# Patient Record
Sex: MALE | Race: WHITE | NOT HISPANIC OR LATINO | Employment: OTHER | URBAN - METROPOLITAN AREA
[De-identification: names, ages, dates, MRNs, and addresses within clinical notes are randomized per-mention and may not be internally consistent; named-entity substitution may affect disease eponyms.]

---

## 2017-03-08 LAB
25(OH)D3 SERPL-MCNC: 59 NG/ML (ref 30–100)
ALBUMIN SERPL-MCNC: 3.9 G/DL (ref 3.6–5.1)
ALBUMIN/GLOB SERPL: 1.4 (CALC) (ref 1–2.5)
ALP SERPL-CCNC: 50 U/L (ref 40–115)
ALT SERPL-CCNC: 15 U/L (ref 9–46)
APPEARANCE UR: CLEAR
AST SERPL-CCNC: 22 U/L (ref 10–35)
B BURGDOR AB SER QL IA: >12 INDEX
B BURGDOR IGG SER QL IB: POSITIVE
B BURGDOR IGM SER QL IB: NEGATIVE
B BURGDOR18KD IGG SER QL IB: REACTIVE
B BURGDOR23KD IGG SER QL IB: REACTIVE
B BURGDOR23KD IGM SER QL IB: ABNORMAL
B BURGDOR28KD IGG SER QL IB: ABNORMAL
B BURGDOR30KD IGG SER QL IB: REACTIVE
B BURGDOR39KD IGG SER QL IB: REACTIVE
B BURGDOR39KD IGM SER QL IB: ABNORMAL
B BURGDOR41KD IGG SER QL IB: REACTIVE
B BURGDOR41KD IGM SER QL IB: ABNORMAL
B BURGDOR45KD IGG SER QL IB: REACTIVE
B BURGDOR58KD IGG SER QL IB: REACTIVE
B BURGDOR66KD IGG SER QL IB: REACTIVE
B BURGDOR93KD IGG SER QL IB: REACTIVE
BACTERIA UR QL AUTO: ABNORMAL /HPF
BASOPHILS # BLD AUTO: 5 CELLS/UL (ref 0–200)
BASOPHILS NFR BLD AUTO: 0.1 %
BILIRUB SERPL-MCNC: 0.9 MG/DL (ref 0.2–1.2)
BILIRUB UR QL STRIP: NEGATIVE
BUN SERPL-MCNC: 23 MG/DL (ref 7–25)
BUN/CREAT SERPL: 16 (CALC) (ref 6–22)
CALCIUM SERPL-MCNC: 9.4 MG/DL (ref 8.6–10.3)
CHLORIDE SERPL-SCNC: 98 MMOL/L (ref 98–110)
CHOLEST SERPL-MCNC: 159 MG/DL (ref 125–200)
CHOLEST/HDLC SERPL: 2.7 (CALC)
CO2 SERPL-SCNC: 28 MMOL/L (ref 20–31)
COLOR UR: ABNORMAL
CREAT SERPL-MCNC: 1.41 MG/DL (ref 0.7–1.18)
EOSINOPHIL # BLD AUTO: 58 CELLS/UL (ref 15–500)
EOSINOPHIL NFR BLD AUTO: 1.2 %
ERYTHROCYTE [DISTWIDTH] IN BLOOD BY AUTOMATED COUNT: 15.3 % (ref 11–15)
GLOBULIN SER CALC-MCNC: 2.7 G/DL (CALC) (ref 1.9–3.7)
GLUCOSE SERPL-MCNC: 86 MG/DL (ref 65–99)
GLUCOSE UR QL STRIP: NEGATIVE
HCT VFR BLD AUTO: 44.6 % (ref 38.5–50)
HDLC SERPL-MCNC: 58 MG/DL
HGB BLD-MCNC: 15.3 G/DL (ref 13.2–17.1)
HGB UR QL STRIP: NEGATIVE
HYALINE CASTS #/AREA URNS LPF: ABNORMAL /LPF
KETONES UR QL STRIP: ABNORMAL
LDLC SERPL CALC-MCNC: 74 MG/DL (CALC)
LEUKOCYTE ESTERASE UR QL STRIP: NEGATIVE
LYMPHOCYTES # BLD AUTO: 518 CELLS/UL (ref 850–3900)
LYMPHOCYTES NFR BLD AUTO: 10.8 %
MCH RBC QN AUTO: 32.9 PG (ref 27–33)
MCHC RBC AUTO-ENTMCNC: 34.2 G/DL (ref 32–36)
MCV RBC AUTO: 96 FL (ref 80–100)
MONOCYTES # BLD AUTO: 336 CELLS/UL (ref 200–950)
MONOCYTES NFR BLD AUTO: 7 %
NEUTROPHILS # BLD AUTO: 3883 CELLS/UL (ref 1500–7800)
NEUTROPHILS NFR BLD AUTO: 80.9 %
NITRITE UR QL STRIP: NEGATIVE
NONHDLC SERPL-MCNC: 101 MG/DL (CALC)
PH UR STRIP: 5.5 [PH] (ref 5–8)
PLATELET # BLD AUTO: 172 THOUSAND/UL (ref 140–400)
PMV BLD REES-ECKER: 7.3 FL (ref 7.5–12.5)
POTASSIUM SERPL-SCNC: 4.1 MMOL/L (ref 3.5–5.3)
PROT SERPL-MCNC: 6.6 G/DL (ref 6.1–8.1)
PROT UR QL STRIP: ABNORMAL
RBC # BLD AUTO: 4.65 MILLION/UL (ref 4.2–5.8)
RBC #/AREA URNS HPF: ABNORMAL /HPF
SL AMB EGFR AFRICAN AMERICAN: 56 ML/MIN/1.73M2
SL AMB EGFR NON AFRICAN AMERICAN: 48 ML/MIN/1.73M2
SODIUM SERPL-SCNC: 138 MMOL/L (ref 135–146)
SP GR UR STRIP: 1.02 (ref 1–1.03)
SQUAMOUS #/AREA URNS HPF: ABNORMAL /HPF
TRIGL SERPL-MCNC: 133 MG/DL
TSH SERPL-ACNC: 1.98 MIU/L (ref 0.4–4.5)
WBC # BLD AUTO: 4.8 THOUSAND/UL (ref 3.8–10.8)
WBC #/AREA URNS HPF: ABNORMAL /HPF

## 2017-03-11 LAB — COLOR UR: NORMAL

## 2017-03-28 LAB
25(OH)D3 SERPL-MCNC: 49 NG/ML (ref 30–100)
ALBUMIN SERPL-MCNC: 3.8 G/DL (ref 3.6–5.1)
ALBUMIN/CREAT UR: 20 MCG/MG CREAT
ALBUMIN/GLOB SERPL: 1.5 (CALC) (ref 1–2.5)
ALP SERPL-CCNC: 49 U/L (ref 40–115)
ALT SERPL-CCNC: 13 U/L (ref 9–46)
AST SERPL-CCNC: 18 U/L (ref 10–35)
BASOPHILS # BLD AUTO: 19 CELLS/UL (ref 0–200)
BASOPHILS NFR BLD AUTO: 0.6 %
BILIRUB SERPL-MCNC: 0.6 MG/DL (ref 0.2–1.2)
BUN SERPL-MCNC: 15 MG/DL (ref 7–25)
BUN/CREAT SERPL: ABNORMAL (CALC) (ref 6–22)
CALCIUM SERPL-MCNC: 9.3 MG/DL (ref 8.6–10.3)
CHLORIDE SERPL-SCNC: 102 MMOL/L (ref 98–110)
CHOLEST SERPL-MCNC: 152 MG/DL (ref 125–200)
CHOLEST/HDLC SERPL: 2.5 (CALC)
CO2 SERPL-SCNC: 32 MMOL/L (ref 20–31)
CREAT SERPL-MCNC: 1.07 MG/DL (ref 0.7–1.18)
CREAT UR-MCNC: 188 MG/DL (ref 20–370)
EOSINOPHIL # BLD AUTO: 77 CELLS/UL (ref 15–500)
EOSINOPHIL NFR BLD AUTO: 2.4 %
ERYTHROCYTE [DISTWIDTH] IN BLOOD BY AUTOMATED COUNT: 17.3 % (ref 11–15)
EST. AVERAGE GLUCOSE BLD GHB EST-MCNC: 103 (CALC)
EST. AVERAGE GLUCOSE BLD GHB EST-SCNC: 5.7 (CALC)
GLOBULIN SER CALC-MCNC: 2.6 G/DL (CALC) (ref 1.9–3.7)
GLUCOSE SERPL-MCNC: 94 MG/DL (ref 65–99)
HBA1C MFR BLD: 5.2 % OF TOTAL HGB
HCT VFR BLD AUTO: 42.9 % (ref 38.5–50)
HDLC SERPL-MCNC: 62 MG/DL
HGB BLD-MCNC: 14.9 G/DL (ref 13.2–17.1)
LDLC SERPL CALC-MCNC: 63 MG/DL (CALC)
LYMPHOCYTES # BLD AUTO: 714 CELLS/UL (ref 850–3900)
LYMPHOCYTES NFR BLD AUTO: 22.3 %
MCH RBC QN AUTO: 33.8 PG (ref 27–33)
MCHC RBC AUTO-ENTMCNC: 34.7 G/DL (ref 32–36)
MCV RBC AUTO: 97.5 FL (ref 80–100)
MICROALBUMIN UR-MCNC: 3.7 MG/DL
MONOCYTES # BLD AUTO: 269 CELLS/UL (ref 200–950)
MONOCYTES NFR BLD AUTO: 8.4 %
NEUTROPHILS # BLD AUTO: 2122 CELLS/UL (ref 1500–7800)
NEUTROPHILS NFR BLD AUTO: 66.3 %
NONHDLC SERPL-MCNC: 90 MG/DL (CALC)
PLATELET # BLD AUTO: 107 THOUSAND/UL (ref 140–400)
PMV BLD REES-ECKER: 7.6 FL (ref 7.5–12.5)
POTASSIUM SERPL-SCNC: 3.5 MMOL/L (ref 3.5–5.3)
PROT SERPL-MCNC: 6.4 G/DL (ref 6.1–8.1)
RBC # BLD AUTO: 4.4 MILLION/UL (ref 4.2–5.8)
SL AMB EGFR AFRICAN AMERICAN: 78 ML/MIN/1.73M2
SL AMB EGFR NON AFRICAN AMERICAN: 68 ML/MIN/1.73M2
SODIUM SERPL-SCNC: 141 MMOL/L (ref 135–146)
TRIGL SERPL-MCNC: 133 MG/DL
TSH SERPL-ACNC: 2.26 MIU/L (ref 0.4–4.5)
WBC # BLD AUTO: 3.2 THOUSAND/UL (ref 3.8–10.8)

## 2017-03-30 LAB
BASOPHILS # BLD AUTO: 15 CELLS/UL (ref 0–200)
BASOPHILS NFR BLD AUTO: 0.4 %
EOSINOPHIL # BLD AUTO: 44 CELLS/UL (ref 15–500)
EOSINOPHIL NFR BLD AUTO: 1.2 %
ERYTHROCYTE [DISTWIDTH] IN BLOOD BY AUTOMATED COUNT: 18.8 % (ref 11–15)
FOLATE SERPL-MCNC: 23.1 NG/ML
HCT VFR BLD AUTO: 43.7 % (ref 38.5–50)
HGB BLD-MCNC: 14.9 G/DL (ref 13.2–17.1)
LYMPHOCYTES # BLD AUTO: 651 CELLS/UL (ref 850–3900)
LYMPHOCYTES NFR BLD AUTO: 17.6 %
MCH RBC QN AUTO: 33.8 PG (ref 27–33)
MCHC RBC AUTO-ENTMCNC: 34.1 G/DL (ref 32–36)
MCV RBC AUTO: 99.1 FL (ref 80–100)
MONOCYTES # BLD AUTO: 315 CELLS/UL (ref 200–950)
MONOCYTES NFR BLD AUTO: 8.5 %
NEUTROPHILS # BLD AUTO: 2675 CELLS/UL (ref 1500–7800)
NEUTROPHILS NFR BLD AUTO: 72.3 %
PLATELET # BLD AUTO: 118 THOUSAND/UL (ref 140–400)
PMV BLD REES-ECKER: 8.3 FL (ref 7.5–12.5)
RBC # BLD AUTO: 4.41 MILLION/UL (ref 4.2–5.8)
VIT B12 SERPL-MCNC: 909 PG/ML (ref 200–1100)
WBC # BLD AUTO: 3.7 THOUSAND/UL (ref 3.8–10.8)

## 2017-07-03 LAB
25(OH)D3 SERPL-MCNC: 74 NG/ML (ref 30–100)
ALBUMIN SERPL-MCNC: 3.9 G/DL (ref 3.6–5.1)
ALBUMIN/CREAT UR: 10 MCG/MG CREAT
ALBUMIN/GLOB SERPL: 1.6 (CALC) (ref 1–2.5)
ALP SERPL-CCNC: 48 U/L (ref 40–115)
ALT SERPL-CCNC: 19 U/L (ref 9–46)
AST SERPL-CCNC: 24 U/L (ref 10–35)
BASOPHILS # BLD AUTO: 13 CELLS/UL (ref 0–200)
BASOPHILS NFR BLD AUTO: 0.3 %
BILIRUB SERPL-MCNC: 1.4 MG/DL (ref 0.2–1.2)
BUN SERPL-MCNC: 11 MG/DL (ref 7–25)
BUN/CREAT SERPL: ABNORMAL (CALC) (ref 6–22)
CALCIUM SERPL-MCNC: 9.5 MG/DL (ref 8.6–10.3)
CHLORIDE SERPL-SCNC: 100 MMOL/L (ref 98–110)
CHOLEST SERPL-MCNC: 155 MG/DL (ref 125–200)
CHOLEST/HDLC SERPL: 2.2 (CALC)
CO2 SERPL-SCNC: 34 MMOL/L (ref 20–31)
CREAT SERPL-MCNC: 1.01 MG/DL (ref 0.7–1.18)
CREAT UR-MCNC: 117 MG/DL (ref 20–370)
EOSINOPHIL # BLD AUTO: 80 CELLS/UL (ref 15–500)
EOSINOPHIL NFR BLD AUTO: 1.9 %
ERYTHROCYTE [DISTWIDTH] IN BLOOD BY AUTOMATED COUNT: 17 % (ref 11–15)
EST. AVERAGE GLUCOSE BLD GHB EST-MCNC: 82 (CALC)
EST. AVERAGE GLUCOSE BLD GHB EST-SCNC: 4.6 (CALC)
GLOBULIN SER CALC-MCNC: 2.4 G/DL (CALC) (ref 1.9–3.7)
GLUCOSE SERPL-MCNC: 102 MG/DL (ref 65–99)
HBA1C MFR BLD: 4.5 % OF TOTAL HGB
HCT VFR BLD AUTO: 46.5 % (ref 38.5–50)
HDLC SERPL-MCNC: 69 MG/DL
HGB BLD-MCNC: 15.9 G/DL (ref 13.2–17.1)
LDLC SERPL CALC-MCNC: 61 MG/DL (CALC)
LYMPHOCYTES # BLD AUTO: 504 CELLS/UL (ref 850–3900)
LYMPHOCYTES NFR BLD AUTO: 12 %
MCH RBC QN AUTO: 35.4 PG (ref 27–33)
MCHC RBC AUTO-ENTMCNC: 34.2 G/DL (ref 32–36)
MCV RBC AUTO: 103.6 FL (ref 80–100)
MICROALBUMIN UR-MCNC: 1.2 MG/DL
MONOCYTES # BLD AUTO: 340 CELLS/UL (ref 200–950)
MONOCYTES NFR BLD AUTO: 8.1 %
NEUTROPHILS # BLD AUTO: 3263 CELLS/UL (ref 1500–7800)
NEUTROPHILS NFR BLD AUTO: 77.7 %
NONHDLC SERPL-MCNC: 86 MG/DL (CALC)
PLATELET # BLD AUTO: 110 THOUSAND/UL (ref 140–400)
PMV BLD REES-ECKER: 7.6 FL (ref 7.5–12.5)
POTASSIUM SERPL-SCNC: 4.2 MMOL/L (ref 3.5–5.3)
PROT SERPL-MCNC: 6.3 G/DL (ref 6.1–8.1)
RBC # BLD AUTO: 4.49 MILLION/UL (ref 4.2–5.8)
SL AMB EGFR AFRICAN AMERICAN: 84 ML/MIN/1.73M2
SL AMB EGFR NON AFRICAN AMERICAN: 72 ML/MIN/1.73M2
SODIUM SERPL-SCNC: 139 MMOL/L (ref 135–146)
TRIGL SERPL-MCNC: 126 MG/DL
TSH SERPL-ACNC: 2.79 MIU/L (ref 0.4–4.5)
WBC # BLD AUTO: 4.2 THOUSAND/UL (ref 3.8–10.8)

## 2017-10-18 LAB — HCV AB SER-ACNC: NEGATIVE

## 2018-07-25 RX ORDER — ALLOPURINOL 100 MG/1
1 TABLET ORAL DAILY
COMMUNITY
End: 2018-08-30 | Stop reason: SDUPTHER

## 2018-07-25 RX ORDER — COLCHICINE 0.6 MG/1
1 TABLET ORAL DAILY PRN
COMMUNITY
End: 2018-12-28 | Stop reason: ALTCHOICE

## 2018-07-25 RX ORDER — PROPRANOLOL HYDROCHLORIDE 120 MG/1
1 CAPSULE, EXTENDED RELEASE ORAL DAILY
COMMUNITY
End: 2018-08-28 | Stop reason: ALTCHOICE

## 2018-07-25 RX ORDER — TADALAFIL 20 MG/1
TABLET ORAL
COMMUNITY
End: 2018-08-28 | Stop reason: SDDI

## 2018-07-25 RX ORDER — NICOTINE POLACRILEX 4 MG/1
1 GUM, CHEWING ORAL DAILY
COMMUNITY
End: 2021-11-03 | Stop reason: SDDI

## 2018-07-25 RX ORDER — FUROSEMIDE 20 MG/1
1 TABLET ORAL DAILY
COMMUNITY
End: 2018-08-28 | Stop reason: SDDI

## 2018-07-25 RX ORDER — LOSARTAN POTASSIUM 100 MG/1
50 TABLET ORAL DAILY
COMMUNITY
End: 2018-12-28 | Stop reason: DRUGHIGH

## 2018-07-30 ENCOUNTER — OFFICE VISIT (OUTPATIENT)
Dept: NEUROLOGY | Facility: CLINIC | Age: 78
End: 2018-07-30
Payer: COMMERCIAL

## 2018-07-30 VITALS
WEIGHT: 159.8 LBS | BODY MASS INDEX: 22.88 KG/M2 | SYSTOLIC BLOOD PRESSURE: 114 MMHG | HEART RATE: 54 BPM | RESPIRATION RATE: 17 BRPM | HEIGHT: 70 IN | DIASTOLIC BLOOD PRESSURE: 66 MMHG

## 2018-07-30 DIAGNOSIS — G25.0 TREMOR, ESSENTIAL: Primary | ICD-10-CM

## 2018-07-30 DIAGNOSIS — G62.89 OTHER SPECIFIED POLYNEUROPATHIES: ICD-10-CM

## 2018-07-30 DIAGNOSIS — Z72.0 TOBACCO ABUSE: ICD-10-CM

## 2018-07-30 PROCEDURE — 99214 OFFICE O/P EST MOD 30 MIN: CPT | Performed by: PSYCHIATRY & NEUROLOGY

## 2018-07-30 NOTE — ASSESSMENT & PLAN NOTE
Electrodiagnostically evident sensory polyneuropathy, predominantly axonal   After full in formal evaluation felt most likely to be on the basis of long-term heavy alcohol consumption  He has made every effort to curtail his drinking and is now down to only an occasional drink  He is not describing any numbness in the distal extremities now, just a coldness  He has had no advancing symptoms since last seen nor has he had the emergence of painful aspects  With a reduction in his alcohol consumption there does appear to actually be a modest overall improvement on examination  --to continue his efforts at complete alcohol cessation

## 2018-07-30 NOTE — PATIENT INSTRUCTIONS
To begin reducing your propranolol using your 80 mg tablets: Take 1 tablet in the morning and half tablet in the evening for 1 week; then 1/2 tablet twice daily  Call with a status report in 2 weeks to discuss possible further reduction  Call before then should you have any increasing tremor difficulties  Should you change her mind with regard to smoking cessation assistance, please call your primary physician or here  Continue your attempts at full alcohol limitation

## 2018-07-30 NOTE — ASSESSMENT & PLAN NOTE
Longstanding presence  Has been well controlled in the recent past as he continues on propranolol 80 mg twice daily  At this juncture, he would like to begin trying to limit medications and is hopeful that he can reduce and eventually discontinue the propranolol without significant tremor aggravation  We discussed the situation and he is aware of the potential for the tremor to re-emerged  However, he would like to begin attempts at weaning   --reduce propranolol to 80 mg in a m  and 40 mg in p m  for 1 week and then 40 mg b i d  --to call the office in 2 weeks with a status report and to discuss possible continued reduction in the propranolol schedule  --to call before 2 weeks should it his tremor reemerge

## 2018-07-30 NOTE — PROGRESS NOTES
Patient is here today for a follow up for his tremors    Patient ID: Gelacio Ryder is a 69 y o  male  Assessment/Plan:    Tremor, essential  Longstanding presence  Has been well controlled in the recent past as he continues on propranolol 80 mg twice daily  At this juncture, he would like to begin trying to limit medications and is hopeful that he can reduce and eventually discontinue the propranolol without significant tremor aggravation  We discussed the situation and he is aware of the potential for the tremor to re-emerged  However, he would like to begin attempts at weaning   --reduce propranolol to 80 mg in a m  and 40 mg in p m  for 1 week and then 40 mg b i d  --to call the office in 2 weeks with a status report and to discuss possible continued reduction in the propranolol schedule  --to call before 2 weeks should it his tremor reemerge  Other specified polyneuropathies  Electrodiagnostically evident sensory polyneuropathy, predominantly axonal   After full in formal evaluation felt most likely to be on the basis of long-term heavy alcohol consumption  He has made every effort to curtail his drinking and is now down to only an occasional drink  He is not describing any numbness in the distal extremities now, just a coldness  He has had no advancing symptoms since last seen nor has he had the emergence of painful aspects  With a reduction in his alcohol consumption there does appear to actually be a modest overall improvement on examination  --to continue his efforts at complete alcohol cessation  Tobacco abuse  Long discussion today  He is now down to 1 pack per day  He is aware and was once again made aware of the appropriateness of full smoking cessation  At this point in time he feels he is not ready to do so and is not interested in any counseling or other support measures    --advised that should he change his mind with regard to smoking cessation support to contact his primary care physician or this office  I spent a total of 25 min with the patient with greater than 50% of that time spent counseling and coordinating his care, specifically discussing his diagnosis, additional tests, and discussing the case with his care team, as detailed above  He will follow up in 6 months or p r n     Subjective:    HPI  Patient, now 69years of age, presents for further assessment with regard to 2 problems  The 1st is his longstanding tremor felt to be of an essential type  He has 4 control been on propranolol 80 mg twice daily  He has tolerated the propranolol without any reported adverse side effects  However, today, he stated that he would like very much to begin to limit his medications and is hopeful that since his tremor is non problematic he can reduce and eventually come off the propranolol  I did make him aware of the fact that the tremor could certainly re-emerged should he do so, but he recognizing so would still like to make that attempt  A 2nd issue is his electrodiagnostically evident and predominantly axonal sensory polyneuropathy  He has had an extensive workup  The only element missing when last seen was his heavy metal screen  That has now been completed and is negative for any issues with arsenic, cadmium, mercury or lead  His polyneuropathy is felt to be secondary to a long history of heavy alcohol use  He continues to limit the amount that he is drinking and is now down to only an occasional drink  He has actually had some symptomatic improvement  He is no longer describing any overt numbness but instead now modest sense of distal extremity coldness  There has been no evolving motor issues  He has been no evolving pain related issues  He continues to smoke at a pace of approximately 1 pack of cigarettes daily  We spoke today with regard to the appropriateness of cessation    However, unfortunately, he is adamant that he is not willing to do so at this point in time and is not interested in any smoking cessation assistance or counseling  An intervening medical problem occurred since his last appointment  At that time he was found to have a liver nodule  He has subsequently had surgery with removal as per patient of approximately 40% of his liver without nodule being reported as cancerous  Fortunately, there appeared to be no evident spread and he continues his appropriate follow-up      Past Medical History:   Diagnosis Date    Arthritis     BPH (benign prostatic hyperplasia) 2002    Caffeine abuse     Colitis     Diverticulitis     ETOH abuse     GERD (gastroesophageal reflux disease)     Gout     Hemorrhoids     HTN (hypertension)     Hydrocele     Hyperlipidemia     Hyperthyroidism     Lyme disease     Meningioma (HCC)     Nicotine abuse     2-3 packs    Pulmonary nodule     Rheumatoid arthritis (HCC)     Thyroid nodule     Tremor      Past Surgical History:   Procedure Laterality Date    APPENDECTOMY      HAND SURGERY  04/30/2018   [facility] LIVER SURGERY  03/09/2018     Social History     Social History    Marital status: /Civil Union     Spouse name: N/A    Number of children: N/A    Years of education: N/A     Occupational History          Social History Main Topics    Smoking status: Current Every Day Smoker    Smokeless tobacco: Current User      Comment: hx of nicotine abuse 2-3 packs    Alcohol use No    Drug use: No    Sexual activity: Not Asked     Other Topics Concern    None     Social History Narrative    Hx of caffeine abuse    Hx of ETOH abuse     Family History   Problem Relation Age of Onset    Diabetes Father     Other Father         colon problems/colostomy    Alcohol abuse Sister      No Known Allergies    Current Outpatient Prescriptions:     allopurinol (ZYLOPRIM) 100 mg tablet, Take 1 tablet by mouth daily, Disp: , Rfl:     colchicine (COLCRYS) 0 6 mg tablet, Take 1 tablet by mouth daily as needed, Disp: , Rfl:     furosemide (LASIX) 20 mg tablet, Take 1 tablet by mouth daily, Disp: , Rfl:     losartan (COZAAR) 100 MG tablet, Take 1 tablet by mouth daily, Disp: , Rfl:     Omeprazole 20 MG TBEC, Take 1 tablet by mouth daily, Disp: , Rfl:     propranolol (INDERAL LA) 120 mg 24 hr capsule, Take 1 capsule by mouth daily, Disp: , Rfl:     tadalafil (CIALIS) 20 MG tablet, Take by mouth, Disp: , Rfl:     Objective:    Blood pressure 114/66, pulse (!) 54, resp  rate 17, height 5' 10" (1 778 m), weight 72 5 kg (159 lb 12 8 oz)  Physical Exam  Lungs clear to auscultation  Rhythm regular  GI (abdomen) soft nontender with bowel sounds present  No lower extremity edema  Neurological Exam  Alert  Pleasantly interactive  No voice tremor  Unremarkable spontaneous gait  Able to heel and toe stand bilaterally  Romberg maneuver performed unremarkably  Cranial nerves 2-12 tested and grossly intact  Accurate with finger-to-nose and heel-to-shin maneuvers bilaterally  No lateralized extremity weakness  With sensory testing he did not describe reduced pin appreciation distally in lower extremities, which is an improvement over his previous examination  In addition, his vibratory appreciation was only modestly reduced distally in the lower extremities  He continued to maintain position sense appreciation of the great toes bilaterally  Muscle stretch reflexes bilaterally 2 throughout the upper extremities, bilaterally 1+ at the knees, absent at the right ankle and trace at the left ankle  Toe response was downgoing bilaterally  No rest or sustention tremor was evident today  No head tremor will or jaw tremor was observed  Tone normal  No tone increase or cogwheeling with contralateral distraction maneuver  ROS:    Review of Systems   Constitutional: Negative  Negative for appetite change and fever  HENT: Negative    Negative for hearing loss, tinnitus, trouble swallowing and voice change  Eyes: Negative  Negative for photophobia and pain  Respiratory: Negative  Negative for shortness of breath  Cardiovascular: Negative  Negative for palpitations  Gastrointestinal: Negative  Negative for nausea and vomiting  Endocrine: Negative  Negative for cold intolerance and heat intolerance  Genitourinary: Negative  Negative for dysuria, frequency and urgency  Musculoskeletal: Negative  Negative for myalgias and neck pain  Skin: Negative  Negative for rash  Allergic/Immunologic: Negative  Neurological: Positive for tremors  Negative for dizziness, seizures, syncope, facial asymmetry, speech difficulty, weakness, light-headedness, numbness and headaches  Hematological: Bruises/bleeds easily  Psychiatric/Behavioral: Negative  Negative for confusion, hallucinations and sleep disturbance  I personally reviewed the ROS that was entered by the medical assistant

## 2018-07-30 NOTE — ASSESSMENT & PLAN NOTE
Long discussion today  He is now down to 1 pack per day  He is aware and was once again made aware of the appropriateness of full smoking cessation  At this point in time he feels he is not ready to do so and is not interested in any counseling or other support measures  --advised that should he change his mind with regard to smoking cessation support to contact his primary care physician or this office

## 2018-07-30 NOTE — LETTER
July 30, 2018     Power Zepeda MD  [address] [facility]  [address] [facility]    Patient: Ross Bueno   YOB: 1940   Date of Visit: 7/30/2018       Dear Dr Wil Cleaning: Thank you for referring Ivan Gutierrez to me for evaluation  Below are my notes for this consultation  If you have questions, please do not hesitate to call me  I look forward to following your patient along with you           Sincerely,        Alexandra Pires MD        CC: No Recipients

## 2018-08-07 ENCOUNTER — TELEPHONE (OUTPATIENT)
Dept: FAMILY MEDICINE CLINIC | Facility: CLINIC | Age: 78
End: 2018-08-07

## 2018-08-07 DIAGNOSIS — A69.20 LYME DISEASE: Primary | ICD-10-CM

## 2018-08-07 DIAGNOSIS — I10 ESSENTIAL HYPERTENSION: Primary | ICD-10-CM

## 2018-08-07 RX ORDER — DOXYCYCLINE HYCLATE 100 MG/1
100 CAPSULE ORAL EVERY 12 HOURS SCHEDULED
Qty: 28 CAPSULE | Refills: 0 | Status: SHIPPED | OUTPATIENT
Start: 2018-08-07 | End: 2018-08-21

## 2018-08-07 RX ORDER — PROPRANOLOL HYDROCHLORIDE 80 MG/1
80 TABLET ORAL EVERY 12 HOURS SCHEDULED
Qty: 60 TABLET | Refills: 2 | Status: SHIPPED | OUTPATIENT
Start: 2018-08-07 | End: 2018-12-07 | Stop reason: SDUPTHER

## 2018-08-07 NOTE — TELEPHONE ENCOUNTER
Spoke with patient states he always had lyme advised him it shows active and Kenia Pearson wants to treat him for it please sign off on medication thanks

## 2018-08-15 ENCOUNTER — TELEPHONE (OUTPATIENT)
Dept: FAMILY MEDICINE CLINIC | Facility: CLINIC | Age: 78
End: 2018-08-15

## 2018-08-16 NOTE — TELEPHONE ENCOUNTER
Called patient per dr Brandon Sánchez  Patient said hes going to wait until his apt on 8-27-18 doesnt want to pay copay

## 2018-08-23 PROBLEM — C22.0 HEPATOCELLULAR CARCINOMA (HCC): Status: ACTIVE | Noted: 2018-02-07

## 2018-08-23 PROBLEM — L89.153 DECUBITUS ULCER OF SACRAL REGION, STAGE 3 (HCC): Status: ACTIVE | Noted: 2018-03-12

## 2018-08-23 PROBLEM — I10 HYPERTENSION: Status: ACTIVE | Noted: 2018-08-23

## 2018-08-23 PROBLEM — K74.60 CIRRHOSIS (HCC): Status: ACTIVE | Noted: 2018-08-23

## 2018-08-23 PROBLEM — C61 MALIGNANT NEOPLASM OF PROSTATE (HCC): Status: ACTIVE | Noted: 2017-07-24

## 2018-08-23 PROBLEM — J44.9 COPD (CHRONIC OBSTRUCTIVE PULMONARY DISEASE) (HCC): Status: ACTIVE | Noted: 2018-08-23

## 2018-08-28 ENCOUNTER — OFFICE VISIT (OUTPATIENT)
Dept: FAMILY MEDICINE CLINIC | Facility: CLINIC | Age: 78
End: 2018-08-28
Payer: COMMERCIAL

## 2018-08-28 VITALS
HEIGHT: 70 IN | SYSTOLIC BLOOD PRESSURE: 120 MMHG | DIASTOLIC BLOOD PRESSURE: 60 MMHG | BODY MASS INDEX: 23.34 KG/M2 | RESPIRATION RATE: 16 BRPM | OXYGEN SATURATION: 95 % | WEIGHT: 163 LBS | HEART RATE: 61 BPM

## 2018-08-28 DIAGNOSIS — L89.153 DECUBITUS ULCER OF SACRAL REGION, STAGE 3 (HCC): ICD-10-CM

## 2018-08-28 DIAGNOSIS — R73.01 IMPAIRED FASTING GLUCOSE: ICD-10-CM

## 2018-08-28 DIAGNOSIS — M1A.00X0 IDIOPATHIC CHRONIC GOUT WITHOUT TOPHUS, UNSPECIFIED SITE: ICD-10-CM

## 2018-08-28 DIAGNOSIS — Z72.0 TOBACCO ABUSE: ICD-10-CM

## 2018-08-28 DIAGNOSIS — I10 ESSENTIAL HYPERTENSION: Primary | ICD-10-CM

## 2018-08-28 DIAGNOSIS — K21.9 GASTROESOPHAGEAL REFLUX DISEASE WITHOUT ESOPHAGITIS: ICD-10-CM

## 2018-08-28 PROBLEM — Z85.46 HISTORY OF PROSTATE CANCER: Status: ACTIVE | Noted: 2018-08-28

## 2018-08-28 PROBLEM — E04.1 THYROID NODULE: Status: ACTIVE | Noted: 2018-08-28

## 2018-08-28 PROBLEM — M72.0 CONTRACTURE OF PALMAR FASCIA: Status: ACTIVE | Noted: 2018-04-10

## 2018-08-28 PROBLEM — M71.21 SYNOVIAL CYST OF RIGHT POPLITEAL SPACE: Status: ACTIVE | Noted: 2018-08-28

## 2018-08-28 PROBLEM — D69.6 THROMBOCYTOPENIA (HCC): Status: ACTIVE | Noted: 2018-08-28

## 2018-08-28 PROCEDURE — 99214 OFFICE O/P EST MOD 30 MIN: CPT | Performed by: FAMILY MEDICINE

## 2018-08-28 PROCEDURE — 3725F SCREEN DEPRESSION PERFORMED: CPT | Performed by: FAMILY MEDICINE

## 2018-08-28 PROCEDURE — 1101F PT FALLS ASSESS-DOCD LE1/YR: CPT | Performed by: FAMILY MEDICINE

## 2018-08-28 PROCEDURE — 1160F RVW MEDS BY RX/DR IN RCRD: CPT | Performed by: FAMILY MEDICINE

## 2018-08-28 PROCEDURE — 3008F BODY MASS INDEX DOCD: CPT | Performed by: FAMILY MEDICINE

## 2018-08-28 PROCEDURE — 3078F DIAST BP <80 MM HG: CPT | Performed by: FAMILY MEDICINE

## 2018-08-28 PROCEDURE — 3074F SYST BP LT 130 MM HG: CPT | Performed by: FAMILY MEDICINE

## 2018-08-28 RX ORDER — COPPER GLUCONATE 2 MG
1 TABLET ORAL DAILY
COMMUNITY
End: 2019-02-14 | Stop reason: ALTCHOICE

## 2018-08-28 NOTE — PROGRESS NOTES
Assessment/Plan:    Gastroesophageal reflux disease  Chronic ,well controlled by Omeprazole  Discuss with pt important lose weight   Multiple small meal ,avoid eat and lying down ,avoid spicy food and avoid provoked food        Impaired fasting glucose  Fair control the low carb diet encouraged patient to continue his low carb diet    Hypertension  Chronic with controlled continue current medication low-salt diet less than 2 g a day ,   low caffeine intake   regular aerobic exercise 20 to totally minute a day diet and important lose weight discussed with the patient      Gout  Chronic asymptomatic patient will continue with the colchicine and allopurinol as prescribed the proper gout diet discussed with the patient    Decubitus ulcer of sacral region, stage 3 ([address] )  Recurrence the will refer patient to wound care we discussed with the patient important sitting on cushingoid chair and the change position every 2 hr       Diagnoses and all orders for this visit:    Essential hypertension  -     CBC and differential; Future  -     Comprehensive metabolic panel; Future  -     Hemoglobin A1C; Future  -     Lipid panel; Future  -     Microalbumin / creatinine urine ratio; Future  -     TSH, 3rd generation with Free T4 reflex; Future    Impaired fasting glucose  -     CBC and differential; Future  -     Comprehensive metabolic panel; Future  -     Hemoglobin A1C; Future  -     Lipid panel; Future  -     Microalbumin / creatinine urine ratio; Future  -     TSH, 3rd generation with Free T4 reflex; Future    Gastroesophageal reflux disease without esophagitis  -     CBC and differential; Future  -     Comprehensive metabolic panel; Future  -     Hemoglobin A1C; Future  -     Lipid panel; Future  -     Microalbumin / creatinine urine ratio; Future  -     TSH, 3rd generation with Free T4 reflex; Future    Tobacco abuse  -     CBC and differential; Future  -     Comprehensive metabolic panel;  Future  -     Hemoglobin A1C; Future  -     Lipid panel; Future  -     Microalbumin / creatinine urine ratio; Future  -     TSH, 3rd generation with Free T4 reflex; Future    Decubitus ulcer of sacral region, stage 3 ([address] )  -     Ambulatory referral to Wound Care; Future    Idiopathic chronic gout without tophus, unspecified site    Other orders  -     Omega-3 Fatty Acids (OMEGA-3 FISH OIL PO); Take 2 g by mouth  -     Copper Gluconate 2 MG TABS; Take 1 tablet by mouth  -     Cholecalciferol 1000 units CHEW; Chew 1 tablet  -     Discontinue: B Complex Vitamins (B COMPLEX 1 PO); Take 1 tablet by mouth          Subjective:   Chief Complaint   Patient presents with    Follow-up     chronic conditions         Patient ID: Burnett Curling is a 69 y o  male      Patient and office follow-up with a chronic condition The patient has long history of hypertension the blood pressure today is in control patient tolerates medication well and no chest pain or short of breath no palpitation no headache patient also with history of for GERD control with the omeprazole deny any abdomen pain no nausea vomiting no diarrhea no heartburn no weight change patient's history of gout will control pain she has seen endocrine no no got exacerbation for more than 1 year patient continued to smoke despite smoking cessation discussed with the patient multiple times he been smoking 1-1 and half pack a day for more than 2 years  Patient history of impaired fasting glucose controlled by low carb diet   Patient did have a history of sacral ulcer stage III on his left buttock area and it is recurrence the that 3 weeks ago did had to drain patient apply antibiotic and does not drain any more but the feel still there no fever no pain at the site and no redness  Recent blood work discussed with the patient        The following portions of the patient's history were reviewed and updated as appropriate: allergies, current medications, past family history, past medical history, past social history, past surgical history and problem list     Review of Systems   Constitutional: Negative for fatigue and fever  HENT: Negative for ear pain, sinus pain, sinus pressure and sore throat  Eyes: Negative for pain and redness  Respiratory: Negative for cough, chest tightness and shortness of breath  Cardiovascular: Negative for chest pain, palpitations and leg swelling  Gastrointestinal: Negative for abdominal pain, blood in stool, constipation, diarrhea and nausea  Genitourinary: Negative for flank pain, frequency and hematuria  Musculoskeletal: Negative for back pain and joint swelling  Skin: Negative for rash  Neurological: Negative for dizziness, numbness and headaches  Hematological: Does not bruise/bleed easily  Objective:  Vitals:    08/28/18 1310   BP: 120/60   BP Location: Left arm   Patient Position: Sitting   Cuff Size: Large   Pulse: 61   Resp: 16   SpO2: 95%   Weight: 73 9 kg (163 lb)   Height: 5' 10" (1 778 m)      Physical Exam   Constitutional: He is oriented to person, place, and time  He appears well-developed and well-nourished  HENT:   Head: Normocephalic  Right Ear: External ear normal    Left Ear: External ear normal    Eyes: Conjunctivae and EOM are normal  Right eye exhibits no discharge  Left eye exhibits no discharge  Neck: No JVD present  Cardiovascular: Normal rate, regular rhythm and normal heart sounds  Exam reveals no gallop  No murmur heard  Pulmonary/Chest: Effort normal  No respiratory distress  He has no wheezes  He has no rales  He exhibits no tenderness  Abdominal: He exhibits no mass  There is no tenderness  There is no rebound  Musculoskeletal: He exhibits no edema or tenderness  Neurological: He is alert and oriented to person, place, and time  Skin: No rash noted  No erythema     On the left buttock there is skin ulcer erythrema on the border almost 3 mm , open in the center and no drainage no tenderness

## 2018-08-28 NOTE — PATIENT INSTRUCTIONS

## 2018-08-28 NOTE — ASSESSMENT & PLAN NOTE
Chronic ,well controlled by Omeprazole  Discuss with pt important lose weight   Multiple small meal ,avoid eat and lying down ,avoid spicy food and avoid provoked food

## 2018-08-28 NOTE — ASSESSMENT & PLAN NOTE
Recurrence the will refer patient to wound care we discussed with the patient important sitting on cushingoid chair and the change position every 2 hr

## 2018-08-28 NOTE — ASSESSMENT & PLAN NOTE
Chronic asymptomatic patient will continue with the colchicine and allopurinol as prescribed the proper gout diet discussed with the patient

## 2018-08-30 DIAGNOSIS — M10.9 GOUT, UNSPECIFIED CAUSE, UNSPECIFIED CHRONICITY, UNSPECIFIED SITE: Primary | ICD-10-CM

## 2018-08-30 RX ORDER — ALLOPURINOL 100 MG/1
100 TABLET ORAL DAILY
Qty: 90 TABLET | Refills: 2 | Status: SHIPPED | OUTPATIENT
Start: 2018-08-30 | End: 2019-09-18 | Stop reason: SDUPTHER

## 2018-09-11 ENCOUNTER — TELEPHONE (OUTPATIENT)
Dept: FAMILY MEDICINE CLINIC | Facility: CLINIC | Age: 78
End: 2018-09-11

## 2018-09-27 ENCOUNTER — ANESTHESIA EVENT (OUTPATIENT)
Dept: PERIOP | Facility: HOSPITAL | Age: 78
End: 2018-09-27
Payer: COMMERCIAL

## 2018-09-27 RX ORDER — CLINDAMYCIN HYDROCHLORIDE 300 MG/1
300 CAPSULE ORAL 3 TIMES DAILY
COMMUNITY
End: 2018-12-28 | Stop reason: ALTCHOICE

## 2018-09-27 RX ORDER — CIPROFLOXACIN 500 MG/1
500 TABLET, FILM COATED ORAL EVERY 12 HOURS SCHEDULED
COMMUNITY
End: 2018-12-28 | Stop reason: ALTCHOICE

## 2018-09-27 NOTE — ANESTHESIA PREPROCEDURE EVALUATION
Review of Systems/Medical History  Patient summary reviewed  Chart reviewed      Cardiovascular  Negative cardio ROS Exercise tolerance (METS): >4,  Hyperlipidemia, Hypertension controlled,   Comment: Persantine stress test neg for ischemia prior to sx  ,  Pulmonary  Smoker cigarette smoker  , Tobacco cessation counseling given Cumulative Pack Years: 61, COPD (no inhalers) , Sleep apnea ,        GI/Hepatic    GERD well controlled, Liver disease (40% hepatic resection @ [facility] 3/18  F/U fine per pt) , alcohol related, cirrhosis and history of liver cancer, Chronic liver disease,        Prostatic disorder, history of prostate cancer       Endo/Other  History of thyroid disease , hypothyroidism,      GYN       Hematology  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,  Thrombocytopenia,    Musculoskeletal  Rheumatoid arthritis Severity: mild, Gout,   Arthritis     Neurology  Negative neurology ROS     Comment: polyneuropaty due to ETOH  Not progressing per Dr Tony Pacheco  Tremor improving and proparanol is being decreased Psychology   Negative psychology ROS              Physical Exam    Airway    Mallampati score: II         Dental       Cardiovascular  Comment: Negative ROS, Cardiovascular exam normal    Pulmonary  Pulmonary exam normal     Other Findings  Fixed and  Upper and lower      Anesthesia Plan  ASA Score- 3     Anesthesia Type- IV sedation with anesthesia with ASA Monitors  Additional Monitors:   Airway Plan:         Plan Factors-Patient not instructed to abstain from smoking on day of procedure  Patient did not smoke on day of surgery  Induction- intravenous  Postoperative Plan- Plan for postoperative opioid use  Informed Consent- Anesthetic plan and risks discussed with patient  I personally reviewed this patient with the CRNA  Discussed and agreed on the Anesthesia Plan with the CRNA  Gabi Cartagena

## 2018-09-28 ENCOUNTER — ANESTHESIA (OUTPATIENT)
Dept: PERIOP | Facility: HOSPITAL | Age: 78
End: 2018-09-28
Payer: COMMERCIAL

## 2018-09-28 ENCOUNTER — HOSPITAL ENCOUNTER (OUTPATIENT)
Facility: HOSPITAL | Age: 78
Setting detail: OUTPATIENT SURGERY
Discharge: HOME | End: 2018-09-28
Attending: SURGERY | Admitting: SURGERY
Payer: COMMERCIAL

## 2018-09-28 VITALS
HEIGHT: 70 IN | DIASTOLIC BLOOD PRESSURE: 96 MMHG | OXYGEN SATURATION: 96 % | WEIGHT: 163 LBS | BODY MASS INDEX: 23.34 KG/M2 | HEART RATE: 52 BPM | SYSTOLIC BLOOD PRESSURE: 144 MMHG | TEMPERATURE: 96.3 F | RESPIRATION RATE: 20 BRPM

## 2018-09-28 DIAGNOSIS — L05.01 PILONIDAL CYST WITH ABSCESS: ICD-10-CM

## 2018-09-28 PROCEDURE — 88304 TISSUE EXAM BY PATHOLOGIST: CPT | Performed by: PATHOLOGY

## 2018-09-28 RX ORDER — DEXAMETHASONE SODIUM PHOSPHATE 4 MG/ML
4 INJECTION, SOLUTION INTRA-ARTICULAR; INTRALESIONAL; INTRAMUSCULAR; INTRAVENOUS; SOFT TISSUE ONCE AS NEEDED
Status: DISCONTINUED | OUTPATIENT
Start: 2018-09-28 | End: 2018-09-28 | Stop reason: HOSPADM

## 2018-09-28 RX ORDER — PROPOFOL 10 MG/ML
INJECTION, EMULSION INTRAVENOUS CONTINUOUS PRN
Status: DISCONTINUED | OUTPATIENT
Start: 2018-09-28 | End: 2018-09-28 | Stop reason: SURG

## 2018-09-28 RX ORDER — SODIUM CHLORIDE, SODIUM LACTATE, POTASSIUM CHLORIDE, CALCIUM CHLORIDE 600; 310; 30; 20 MG/100ML; MG/100ML; MG/100ML; MG/100ML
75 INJECTION, SOLUTION INTRAVENOUS CONTINUOUS
Status: DISCONTINUED | OUTPATIENT
Start: 2018-09-28 | End: 2018-09-28 | Stop reason: HOSPADM

## 2018-09-28 RX ORDER — FENTANYL CITRATE/PF 50 MCG/ML
12.5 SYRINGE (ML) INJECTION
Status: DISCONTINUED | OUTPATIENT
Start: 2018-09-28 | End: 2018-09-28 | Stop reason: HOSPADM

## 2018-09-28 RX ORDER — DIPHENHYDRAMINE HYDROCHLORIDE 50 MG/ML
12.5 INJECTION INTRAMUSCULAR; INTRAVENOUS ONCE
Status: DISCONTINUED | OUTPATIENT
Start: 2018-09-28 | End: 2018-09-28 | Stop reason: HOSPADM

## 2018-09-28 RX ORDER — LIDOCAINE HYDROCHLORIDE 10 MG/ML
INJECTION, SOLUTION INFILTRATION; PERINEURAL AS NEEDED
Status: DISCONTINUED | OUTPATIENT
Start: 2018-09-28 | End: 2018-09-28 | Stop reason: SURG

## 2018-09-28 RX ORDER — MAGNESIUM HYDROXIDE 1200 MG/15ML
LIQUID ORAL AS NEEDED
Status: DISCONTINUED | OUTPATIENT
Start: 2018-09-28 | End: 2018-09-28 | Stop reason: HOSPADM

## 2018-09-28 RX ORDER — FENTANYL CITRATE/PF 50 MCG/ML
25 SYRINGE (ML) INJECTION
Status: DISCONTINUED | OUTPATIENT
Start: 2018-09-28 | End: 2018-09-28 | Stop reason: HOSPADM

## 2018-09-28 RX ORDER — OXYCODONE HYDROCHLORIDE AND ACETAMINOPHEN 5; 325 MG/1; MG/1
1 TABLET ORAL EVERY 4 HOURS PRN
Qty: 20 TABLET | Refills: 0 | Status: SHIPPED | OUTPATIENT
Start: 2018-09-28 | End: 2018-10-08

## 2018-09-28 RX ORDER — FENTANYL CITRATE 50 UG/ML
INJECTION, SOLUTION INTRAMUSCULAR; INTRAVENOUS AS NEEDED
Status: DISCONTINUED | OUTPATIENT
Start: 2018-09-28 | End: 2018-09-28 | Stop reason: SURG

## 2018-09-28 RX ADMIN — FENTANYL CITRATE 50 MCG: 50 INJECTION INTRAMUSCULAR; INTRAVENOUS at 08:32

## 2018-09-28 RX ADMIN — SODIUM CHLORIDE, POTASSIUM CHLORIDE, SODIUM LACTATE AND CALCIUM CHLORIDE 75 ML/HR: 600; 310; 30; 20 INJECTION, SOLUTION INTRAVENOUS at 08:16

## 2018-09-28 RX ADMIN — FENTANYL CITRATE 25 MCG: 50 INJECTION INTRAMUSCULAR; INTRAVENOUS at 08:45

## 2018-09-28 RX ADMIN — FENTANYL CITRATE 25 MCG: 50 INJECTION INTRAMUSCULAR; INTRAVENOUS at 08:39

## 2018-09-28 RX ADMIN — PROPOFOL 120 MCG/KG/MIN: 10 INJECTION, EMULSION INTRAVENOUS at 08:32

## 2018-09-28 RX ADMIN — CEFAZOLIN SODIUM 2000 MG: 2 SOLUTION INTRAVENOUS at 08:32

## 2018-09-28 RX ADMIN — LIDOCAINE HYDROCHLORIDE 50 MG: 10 INJECTION, SOLUTION INFILTRATION; PERINEURAL at 08:32

## 2018-09-28 NOTE — ANESTHESIA POSTPROCEDURE EVALUATION
Post-Op Assessment Note      CV Status:  Stable    Mental Status:  Alert and awake    Hydration Status:  Euvolemic    PONV Controlled:  Controlled    Airway Patency:  Patent    Post Op Vitals Reviewed: Yes          Staff: AnesthesiologistLELA           /64 (09/28/18 0930)    Temp      Pulse 55 (09/28/18 0930)   Resp 20 (09/28/18 0930)    SpO2 97 % (09/28/18 0930)

## 2018-09-28 NOTE — DISCHARGE INSTR - AVS FIRST PAGE
No lifting, no driving, keep clean and  Dry    Take one Percocet every 4 hours for pain    Take one Cipro every 12 hours    Take one Cleocin every 8 hours

## 2018-09-28 NOTE — DISCHARGE INSTRUCTIONS
Pilonidal Cyst   WHAT YOU SHOULD KNOW:   A pilonidal cyst is a small sac under the skin that looks like a small hole or dimple  It usually grows in the skin on your lower back, near the bottom of the spine  AFTER YOU LEAVE:   Follow up with your healthcare provider as directed:  Write down your questions so you remember to ask them during your visits  Contact your primary healthcare provider if:   · You have a fever  · Your cyst is red and swollen  · No lifting, no driving, Keep clean and dry  · Your cyst has white or yellow fluid coming out of it  · You have questions or concerns about your condition or care  © 2014 [address] is for End User's use only and may not be sold, redistributed or otherwise used for commercial purposes  All illustrations and images included in CareNotes® are the copyrighted property of [employer]  or [employer]  The above information is an  only  It is not intended as medical advice for individual conditions or treatments  Talk to your doctor, nurse or pharmacist before following any medical regimen to see if it is safe and effective for you

## 2018-09-28 NOTE — NURSING NOTE
Pt ambulatory, voided QS  Seen by Dr Milena Lockhart for bleeding, saturated dressing  Pt sutured and repacked  Sacral dressing clean and dry  Pt instructed to keep dressing clean and dry until seen in Dr Reanna David office on Monday  Pt understands to notify Dr Milena Lockhart for any further bleeding or other problems  Written and verbal instructions given Rx being filled by [address] and understands how to take  Pt tolerated po and has no complaints

## 2018-09-28 NOTE — PROGRESS NOTES
Bleeding controlled with one figure of eight suture of 3-0 Vicryl    Repacked and sent home in good condition

## 2018-09-28 NOTE — ADDENDUM NOTE
Addendum  created 09/28/18 1005 by Navdeep Chávez CRNA    Anesthesia Intra LDAs edited, LDA properties accepted

## 2018-09-28 NOTE — OP NOTE
OPERATIVE REPORT  PATIENT NAME: Lillie Cervantes    :  1940  MRN: [id number]  Pt Location:  OR ROOM 10    SURGERY DATE: 2018    Surgeon(s) and Role:     Maria Eugenia Martin DO - Primary    Preop Diagnosis:  Pilonidal cyst with abscess [L05 01]    Post-Op Diagnosis Codes:     * Pilonidal cyst with abscess [L05 01]    Procedure(s) (LRB):  PILONIDAL CYSTECTOMY (N/A)    Specimen(s):  ID Type Source Tests Collected by Time Destination   1 : Pilonidal cyst Tissue Pilonidal Cyst/Sinus TISSUE EXAM Farzaneh DO Isael 2018 0855        Estimated Blood Loss:   Minimal    Drains: 5 yards of 1/4"  Plain packing       Anesthesia Type:   IV Sedation with Anesthesia    Operative Indications:  Pilonidal cyst with abscess [L05 01]  Same    Operative Findings:  Pilonidal cyst with abscess    Complications:   None    Procedure and Technique: With the patient in the prone position prepped and draped usual manner a vertical elliptical incision was made in the midline over the coccyx  The draining cyst was located just to the left of the midline at the level of the coccyx  Dissection was carried down through the subcutaneous tissue purulent material was present  Dissection was carried down to the presacral fascia and the specimen was removed saved and sent to pathology Lab tissue diagnosis  The wound was debrided  Hemostasis was provided with electrocautery  The digital rectal exam was carried out there is no blood examining finger no palpable masses are present right angle retractors were inserted into the anal canal and no internal opening was present and therefore no fistula was present  The wound was then irrigated with with normal saline and packed with 5 yd of 1 quarter-inch plain gauze the wound was dressed with 4x4s which were  sutured in place with 2 simple sutures of 3-0 nylon    Pressure dressing was placed and he  was in he was taken to PACU in stable condition   I was present for the entire procedure    Patient Disposition:  PACU     SIGNATURE: Harrold Buerger, DO  DATE: September 28, 2018  TIME: 9:06 AM

## 2018-09-28 NOTE — NURSING NOTE
Pt returned to [facility] awake alert, IV infusing  Dressing to sacral area is clean, dry, and intact  Denies pain at this time  Taking clear liquids

## 2018-09-29 ENCOUNTER — HOSPITAL ENCOUNTER (EMERGENCY)
Facility: HOSPITAL | Age: 78
Discharge: HOME | End: 2018-09-29
Attending: EMERGENCY MEDICINE | Admitting: EMERGENCY MEDICINE
Payer: COMMERCIAL

## 2018-09-29 VITALS
HEART RATE: 68 BPM | DIASTOLIC BLOOD PRESSURE: 56 MMHG | WEIGHT: 164 LBS | OXYGEN SATURATION: 97 % | SYSTOLIC BLOOD PRESSURE: 115 MMHG | TEMPERATURE: 97.5 F | RESPIRATION RATE: 14 BRPM | BODY MASS INDEX: 23.53 KG/M2

## 2018-09-29 DIAGNOSIS — Z48.89 ENCOUNTER FOR POST SURGICAL WOUND CHECK: Primary | ICD-10-CM

## 2018-09-29 LAB
HCT VFR BLD AUTO: 41.2 % (ref 36.5–49.3)
HGB BLD-MCNC: 14.1 G/DL (ref 12–17)

## 2018-09-29 PROCEDURE — 99283 EMERGENCY DEPT VISIT LOW MDM: CPT

## 2018-09-29 PROCEDURE — 85014 HEMATOCRIT: CPT | Performed by: PHYSICIAN ASSISTANT

## 2018-09-29 PROCEDURE — 85018 HEMOGLOBIN: CPT | Performed by: PHYSICIAN ASSISTANT

## 2018-09-29 PROCEDURE — 36415 COLL VENOUS BLD VENIPUNCTURE: CPT | Performed by: PHYSICIAN ASSISTANT

## 2018-09-29 NOTE — DISCHARGE INSTRUCTIONS
Acute Wound Care   AMBULATORY CARE:   An acute wound  is an injury that causes a break in the skin  An acute wound can happen suddenly, last a short time, and may heal on its own  Common signs and symptoms of an acute wound:   · A cut, tear, or gash in your skin    · Bleeding, swelling, pain, or trouble moving the affected area    · Dirt or foreign objects inside the wound     · Milky, yellow, green, or brown pus in the wound     · Red, tender, or warm area around the pus    · Fever  Seek care immediately if:   · You have pus or a foul odor coming from the wound  · You have sudden trouble breathing or chest pain  · Blood soaks through your bandage  Contact your healthcare provider if:   · You have muscle, joint, or body aches, sweating, or a fever  · You have more swelling, redness, or bleeding in your wound  · Your skin is itchy, swollen, or you have a rash  · You have questions or concerns about your condition or care  Treatment for an acute wound  may include any of the following:  · Cleansing  is done with soap and water to wash away germs and decrease the risk of infection  Sterile water further cleans the wound  The cleaning is done under high pressure with a catheter tip and large syringe  A solution that kills germs may also be used  · Debridement  is done to clean and remove objects, dirt, or dead tissues from the open wound  Healthcare providers may also drain the wound to clean out pus  · Closure of the wound  is done with stitches, staples, skin adhesive, or other treatments  This may be done if the wound is wide or deep  Stitches may be needed if the wound is in an area that moves a lot, such as the hands, feet, and joints  Stitches may help to keep the wound from getting infected  They may also decrease the amount of scarring you have  Some wounds may heal better without stitches    Wound care:   · If your wound was closed with thin strips of medical tape, keep them clean and dry  The strips of medical tape will fall off on their own  Do not pull them off  · Keep the bandage clean and dry  Do not remove the bandage over your wound unless your healthcare provider says it is okay  · Wash your hands before and after you take care of your wound to prevent infection  · Clean the wound as directed  If you cannot reach the wound, have someone help you  · If you have packing, make sure all the gauze used to pack the wound is taken out and replaced as directed  Keep track of how many gauze dressings are placed inside the wound  Follow up with your healthcare provider as directed:  Write down your questions so you remember to ask them during your visits  © 2016 [employer] is for End User's use only and may not be sold, redistributed or otherwise used for commercial purposes  All illustrations and images included in CareNotes® are the copyrighted property of A D [employer] M [employer]  or [employer]  The above information is an  only  It is not intended as medical advice for individual conditions or treatments  Talk to your doctor, nurse or pharmacist before following any medical regimen to see if it is safe and effective for you

## 2018-09-29 NOTE — ED PROVIDER NOTES
History  Chief Complaint   Patient presents with    Abscess     Pt reports he had an abscess drained surgically here yesterday  Pt reports bleeding to the site before leaving yesterday, states continued bleeding today     Patient is a 41-year-old presents emergency department for evaluation of postoperative wound drainage  Patient states he had pilonidal cyst strain yesterday at [facility] with Dr Adia Brown  He states that after the procedure he was having postoperative bleeding and his wound was packed and stitches were put in"  Patient states that overnight he completely saturated a zach with blood that he had in his bed  He states that the pain is not intense however he is just concerned of the bleeding  Patient is not currently any blood thinners  No headaches, dizziness, chest pain, shortness of breath  No other complaints at this time  Patient has never had problems with bleeding in the past            Prior to Admission Medications   Prescriptions Last Dose Informant Patient Reported? Taking?    Cholecalciferol 1000 units CHEW  Self Yes Yes   Sig: Chew 5,000 Units daily     Copper Gluconate 2 MG TABS  Self Yes Yes   Sig: Take 1 tablet by mouth daily     Omega-3 Fatty Acids (OMEGA-3 FISH OIL PO)  Self Yes Yes   Sig: Take 4,350 mg by mouth daily     Omeprazole 20 MG TBEC  Self Yes Yes   Sig: Take 1 tablet by mouth daily   allopurinol (ZYLOPRIM) 100 mg tablet   No Yes   Sig: Take 1 tablet (100 mg total) by mouth daily   Patient taking differently: Take 50 mg by mouth daily     ciprofloxacin (CIPRO) 500 mg tablet   Yes Yes   Sig: Take 500 mg by mouth every 12 (twelve) hours   clindamycin (CLEOCIN) 300 MG capsule   Yes Yes   Sig: Take 300 mg by mouth 3 (three) times a day   colchicine (COLCRYS) 0 6 mg tablet  Self Yes Yes   Sig: Take 1 tablet by mouth daily as needed   losartan (COZAAR) 100 MG tablet  Self Yes Yes   Sig: Take 50 mg by mouth daily     oxyCODONE-acetaminophen (PERCOCET) 5-325 mg per tablet   No Yes   Sig: Take 1 tablet by mouth every 4 (four) hours as needed for moderate pain for up to 10 days Max Daily Amount: 6 tablets   propranolol (INDERAL) 80 mg tablet  Self No Yes   Sig: Take 1 tablet (80 mg total) by mouth every 12 (twelve) hours   Patient taking differently: Take 40 mg by mouth daily        Facility-Administered Medications: None       Past Medical History:   Diagnosis Date    Anemia     dur to blood loss in the past    BPH (benign prostatic hyperplasia) 2002    Caffeine abuse     Cancer (HCC)     hepatocellular, prostate    Colitis     COPD (chronic obstructive pulmonary disease) (HCC)     chronic bronchitis    Diverticulitis     ETOH abuse     GERD (gastroesophageal reflux disease)     Gout     Hearing loss     Hemorrhoids     History of transfusion     HTN (hypertension)     Hydrocele     Hyperlipidemia     Hyperthyroidism     Irritable bowel syndrome     Liver disease     cirrhosis    Lyme disease     Meningioma (HCC)     Nicotine abuse     2-3 packs    Pressure injury of skin     Stage 3 that healed and has reoccurred    Pulmonary nodule     Sleep apnea     Thyroid nodule     Tobacco abuse     Tremor        Past Surgical History:   Procedure Laterality Date    APPENDECTOMY      CATARACT EXTRACTION      debra    ESOPHAGOGASTRODUODENOSCOPY      HAND SURGERY  04/30/2018    LIVER SURGERY  03/09/2018    THYROID SURGERY      TONSILLECTOMY         Family History   Problem Relation Age of Onset    Diabetes Father     Other Father         colon problems/colostomy    Alcohol abuse Sister      I have reviewed and agree with the history as documented      Social History   Substance Use Topics    Smoking status: Current Every Day Smoker     Packs/day: 1 50     Types: Cigarettes    Smokeless tobacco: Current User      Comment: hx of nicotine abuse 2-3 packs    Alcohol use No        Review of Systems   Constitutional: Negative for activity change, appetite change, chills, fatigue and fever  HENT: Negative for ear pain, sneezing and sore throat  Eyes: Negative for pain and visual disturbance  Respiratory: Negative for cough and shortness of breath  Cardiovascular: Negative for chest pain and palpitations  Gastrointestinal: Negative for abdominal pain, blood in stool, constipation, diarrhea, nausea and vomiting  Genitourinary: Negative for dysuria and hematuria  Musculoskeletal: Negative for arthralgias, neck pain and neck stiffness  Skin: Positive for wound  Negative for rash  Neurological: Negative for dizziness, weakness, light-headedness, numbness and headaches  All other systems reviewed and are negative  Physical Exam  Physical Exam   Constitutional: He is oriented to person, place, and time  He appears well-developed and well-nourished  No distress  HENT:   Head: Normocephalic and atraumatic  Nose: Nose normal    Eyes: Pupils are equal, round, and reactive to light  Conjunctivae and EOM are normal    Neck: Normal range of motion  Neck supple  Cardiovascular: Normal rate, regular rhythm, normal heart sounds and intact distal pulses  Exam reveals no gallop and no friction rub  No murmur heard  Pulmonary/Chest: Effort normal and breath sounds normal  No respiratory distress  He has no wheezes  He has no rales  Abdominal: Soft  He exhibits no distension  There is no tenderness  Musculoskeletal: Normal range of motion  He exhibits no edema, tenderness or deformity  Lymphadenopathy:     He has no cervical adenopathy  Neurological: He is alert and oriented to person, place, and time  Skin: Skin is warm and dry  Capillary refill takes less than 2 seconds  He is not diaphoretic  No erythema  No pallor  Large 4 cm incision noted on the medial aspect of his left buttocks  Packing fully saturated with blood noted  Four sutures placed at gluteal cleft parallel to wound  No tenderness to palpation  No cellulitis noted  Bloody drainage noted when patient moves  Nursing note and vitals reviewed  Vital Signs  ED Triage Vitals   Temperature Pulse Respirations Blood Pressure SpO2   09/29/18 0939 09/29/18 0939 09/29/18 0939 09/29/18 0939 09/29/18 0939   97 5 °F (36 4 °C) 65 18 135/67 98 %      Temp Source Heart Rate Source Patient Position - Orthostatic VS BP Location FiO2 (%)   09/29/18 0939 09/29/18 0939 09/29/18 0939 09/29/18 0939 --   Oral Monitor Sitting Right arm       Pain Score       09/29/18 0936       No Pain           Vitals:    09/29/18 0939 09/29/18 0945 09/29/18 1108 09/29/18 1115   BP: 135/67 135/67 115/56 115/56   Pulse: 65 64 68    Patient Position - Orthostatic VS: Sitting  Lying        Visual Acuity      ED Medications  Medications - No data to display    Diagnostic Studies  Results Reviewed     Procedure Component Value Units Date/Time    Hemoglobin and hematocrit, blood [[id number]]  (Normal) Collected:  09/29/18 1117    Lab Status:  Final result Specimen:  Blood from Arm, Right Updated:  09/29/18 1132     Hemoglobin 14 1 g/dL      Hematocrit 41 2 %                  No orders to display              Procedures  Procedures       Phone Contacts  ED Phone Contact    ED Course  ED Course as of Sep 29 1146   Sat Sep 29, 2018   1103 Page #2 to Dr Jael Alexander service    [phone] with Dr Nate Garcia who will see patient in office in one hour   Requesting H/H then discharge to office for eval                                MDM  Number of Diagnoses or Management Options  Encounter for post surgical wound check: new and requires workup  Risk of Complications, Morbidity, and/or Mortality  Presenting problems: low  Diagnostic procedures: low  Management options: low    Patient Progress  Patient progress: stable    CritCare Time    Disposition  Final diagnoses:   Encounter for post surgical wound check     Time reflects when diagnosis was documented in both MDM as applicable and the Disposition within this note     Time User Action Codes Description Comment    9/29/2018 11:22 AM Kar Madrid Add [Z48 89] Encounter for post surgical wound check       ED Disposition     ED Disposition Condition Comment    Discharge  Lillie Cervantes discharge to home/self care  Condition at discharge: Stable        Follow-up Information     Follow up With Specialties Details Why Contact Info Additional Information    Farzaneh Kirkland DO Vascular Surgery, General Surgery Go to go now  [address] [place] [address] Emergency Department Emergency Medicine  If symptoms worsen [address] [facility]  [phone] [place] ED, [address], [place], [address]          Patient's Medications   Discharge Prescriptions    No medications on file     No discharge procedures on file      ED Provider  Electronically Signed by           Michela Joy PA-C  09/29/18 8637

## 2018-11-26 DIAGNOSIS — I10 HYPERTENSION, UNSPECIFIED TYPE: Primary | ICD-10-CM

## 2018-11-26 RX ORDER — FUROSEMIDE 40 MG/1
40 TABLET ORAL
COMMUNITY
Start: 2013-09-30 | End: 2018-11-26 | Stop reason: SDUPTHER

## 2018-11-26 RX ORDER — FUROSEMIDE 40 MG/1
40 TABLET ORAL DAILY
Qty: 90 TABLET | Refills: 0 | Status: SHIPPED | OUTPATIENT
Start: 2018-11-26 | End: 2018-12-28 | Stop reason: SDDI

## 2018-12-07 DIAGNOSIS — I10 ESSENTIAL HYPERTENSION: ICD-10-CM

## 2018-12-07 RX ORDER — PROPRANOLOL HYDROCHLORIDE 80 MG/1
80 TABLET ORAL EVERY 12 HOURS SCHEDULED
Qty: 60 TABLET | Refills: 0 | Status: SHIPPED | OUTPATIENT
Start: 2018-12-07 | End: 2018-12-19 | Stop reason: SDUPTHER

## 2018-12-07 NOTE — TELEPHONE ENCOUNTER
Will refill medication for 30 day supplies patient is due for blood work and office visit to call the patient let him know

## 2018-12-19 DIAGNOSIS — I10 ESSENTIAL HYPERTENSION: ICD-10-CM

## 2018-12-19 RX ORDER — PROPRANOLOL HYDROCHLORIDE 80 MG/1
80 TABLET ORAL EVERY 12 HOURS SCHEDULED
Qty: 60 TABLET | Refills: 0 | Status: SHIPPED | OUTPATIENT
Start: 2018-12-19 | End: 2019-09-25 | Stop reason: ALTCHOICE

## 2018-12-28 ENCOUNTER — OFFICE VISIT (OUTPATIENT)
Dept: FAMILY MEDICINE CLINIC | Facility: CLINIC | Age: 78
End: 2018-12-28
Payer: COMMERCIAL

## 2018-12-28 VITALS
DIASTOLIC BLOOD PRESSURE: 60 MMHG | SYSTOLIC BLOOD PRESSURE: 120 MMHG | RESPIRATION RATE: 16 BRPM | BODY MASS INDEX: 23.62 KG/M2 | WEIGHT: 165 LBS | HEIGHT: 70 IN | HEART RATE: 98 BPM

## 2018-12-28 DIAGNOSIS — Z72.0 TOBACCO ABUSE: ICD-10-CM

## 2018-12-28 DIAGNOSIS — K70.30 ALCOHOLIC CIRRHOSIS OF LIVER WITHOUT ASCITES (HCC): ICD-10-CM

## 2018-12-28 DIAGNOSIS — Z23 NEED FOR SHINGLES VACCINE: ICD-10-CM

## 2018-12-28 DIAGNOSIS — R73.01 IMPAIRED FASTING GLUCOSE: ICD-10-CM

## 2018-12-28 DIAGNOSIS — K21.9 GASTROESOPHAGEAL REFLUX DISEASE WITHOUT ESOPHAGITIS: ICD-10-CM

## 2018-12-28 DIAGNOSIS — I10 ESSENTIAL HYPERTENSION: Primary | ICD-10-CM

## 2018-12-28 DIAGNOSIS — J44.9 CHRONIC OBSTRUCTIVE PULMONARY DISEASE, UNSPECIFIED COPD TYPE (HCC): ICD-10-CM

## 2018-12-28 DIAGNOSIS — G25.0 TREMOR, ESSENTIAL: ICD-10-CM

## 2018-12-28 DIAGNOSIS — M1A.00X0 IDIOPATHIC CHRONIC GOUT WITHOUT TOPHUS, UNSPECIFIED SITE: ICD-10-CM

## 2018-12-28 PROCEDURE — 1160F RVW MEDS BY RX/DR IN RCRD: CPT | Performed by: FAMILY MEDICINE

## 2018-12-28 PROCEDURE — 3074F SYST BP LT 130 MM HG: CPT | Performed by: FAMILY MEDICINE

## 2018-12-28 PROCEDURE — 99214 OFFICE O/P EST MOD 30 MIN: CPT | Performed by: FAMILY MEDICINE

## 2018-12-28 PROCEDURE — 3078F DIAST BP <80 MM HG: CPT | Performed by: FAMILY MEDICINE

## 2018-12-28 PROCEDURE — 4004F PT TOBACCO SCREEN RCVD TLK: CPT | Performed by: FAMILY MEDICINE

## 2018-12-28 RX ORDER — LOSARTAN POTASSIUM 50 MG/1
25 TABLET ORAL DAILY
Qty: 30 TABLET | Refills: 2 | Status: SHIPPED | OUTPATIENT
Start: 2018-12-28 | End: 2019-05-14 | Stop reason: SDUPTHER

## 2018-12-28 NOTE — PROGRESS NOTES
Subjective:   Chief Complaint   Patient presents with    Follow-up     chronic conditions         Patient ID: Sheree Rachel is a 69 y o  male  Patient here follow-up with a chronic condition  Patient's history of for GERD chronic on omeprazole 20 mg asymptomatic no heartburn no abdomen pain nausea vomiting or diarrhea no weight change patient does follow up with GI also he had history of cirrhosis and he per toe cell carcinoma status post surgical treatment the patient and he continued to smoke and she continued to drink alcohol  Patient's history of hypertension he deny any chest pain short of breath no palpitation no headache no blurred vision no weakness or lateralized of the symptom currently patient has been decrease in his medication without consult Dr  he stop the and the furosemide and also he decrease the dose of the losartan 100 mg to half tablet and per patient he doing well blood pressure controlled  Also patient has history of for gout chronic he is on the preventive the allopurinol 100 mg he been taking half of that dose and also he stop the day call G seen patient asymptomatic  Patient's history of COPD on chronic smoking and patient deny any short of breath no chest pain no cough no hematosis and patient free of exacerbation free of hospitalization patient does followed by Pulmonary  Recent blood work discussed with the patient        The following portions of the patient's history were reviewed and updated as appropriate: allergies, current medications, past family history, past medical history, past social history, past surgical history and problem list     Review of Systems   Constitutional: Negative for fatigue and fever  HENT: Negative for ear pain, sinus pain, sinus pressure and sore throat  Eyes: Negative for pain and redness  Respiratory: Negative for cough, chest tightness and shortness of breath  Cardiovascular: Negative for chest pain, palpitations and leg swelling  Gastrointestinal: Negative for abdominal pain, blood in stool, constipation, diarrhea and nausea  Genitourinary: Negative for flank pain, frequency and hematuria  Musculoskeletal: Negative for back pain and joint swelling  Skin: Negative for rash  Neurological: Negative for dizziness, numbness and headaches  Hematological: Does not bruise/bleed easily  Objective:  Vitals:    12/28/18 1321   BP: 120/60   BP Location: Left arm   Patient Position: Sitting   Cuff Size: Standard   Pulse: 98   Resp: 16   Weight: 74 8 kg (165 lb)   Height: 5' 10" (1 778 m)      Physical Exam   Constitutional: He is oriented to person, place, and time  He appears well-developed and well-nourished  HENT:   Head: Normocephalic  Right Ear: External ear normal    Left Ear: External ear normal    Eyes: Conjunctivae and EOM are normal  Right eye exhibits no discharge  Left eye exhibits no discharge  Neck: No JVD present  Cardiovascular: Normal rate, regular rhythm and normal heart sounds  Exam reveals no gallop  No murmur heard  Pulmonary/Chest: Effort normal  No respiratory distress  He has no wheezes  He has no rales  He exhibits no tenderness  Abdominal: He exhibits no mass  There is no tenderness  There is no rebound  Musculoskeletal: He exhibits no edema or tenderness  Neurological: He is alert and oriented to person, place, and time  Skin: No rash noted  No erythema           Assessment/Plan:    Cirrhosis ([address] )  Chronic asymptomatic patient does follow with GI the no edema no increase in the weight and no ascites    Impaired fasting glucose  Chronic asymptomatic fair control with the low carb diet encouraged patient to lose weight    Gastroesophageal reflux disease  Chronic ,well controlled by omeprazole 20 mg once a day  Discuss with pt important lose weight   Multiple small meal ,avoid eat and lying down ,avoid spicy food and avoid provoked food        COPD (chronic obstructive pulmonary disease) [facility])  Chronic asymptomatic free of exacerbation free of hospitalization and not on any inhaler patient stop it by himself patient does followed by the Pulmonary but he did not see him for while    Hypertension  Chronic asymptomatic patient currently taking half dose of the losartan 100 mg will stop the 100 mg of losartan on prescribe him 50 mg of losartan the patient did stop his Lasix  We discussed with the patient low-salt diet and monitor his blood pressure    Tobacco abuse  Tobacco Use/Cessation  i     I advised patient to quit, and offered support  Discussed current use pattern  Asked patient to inform me when they set a quit date     At discussed with the patient complication of for smoking increase the risk of multiple kind of cancer he is aware  We offer him script for nicotine patch patient decline it                  Gout  Free of gout chronic asymptomatic patient did stop the by himself the course she seen and also he is taking half dose of the allopurinol and 100 mg       Diagnoses and all orders for this visit:    Essential hypertension  -     losartan (COZAAR) 50 mg tablet; Take 0 5 tablets (25 mg total) by mouth daily  -     CBC and differential; Future  -     Comprehensive metabolic panel; Future  -     Lipid Panel with Direct LDL reflex; Future  -     TSH, 3rd generation with Free T4 reflex; Future  -     Hemoglobin A1C; Future    Alcoholic cirrhosis of liver without ascites (HCC)  -     CBC and differential; Future  -     Comprehensive metabolic panel; Future  -     Lipid Panel with Direct LDL reflex; Future  -     TSH, 3rd generation with Free T4 reflex; Future  -     Hemoglobin A1C; Future    Impaired fasting glucose  -     CBC and differential; Future  -     Comprehensive metabolic panel; Future  -     Lipid Panel with Direct LDL reflex; Future  -     TSH, 3rd generation with Free T4 reflex;  Future  -     Hemoglobin A1C; Future    Chronic obstructive pulmonary disease, unspecified COPD type ([facility] )  -     CBC and differential; Future  -     Comprehensive metabolic panel; Future  -     Lipid Panel with Direct LDL reflex; Future  -     TSH, 3rd generation with Free T4 reflex; Future  -     Hemoglobin A1C; Future    Tremor, essential  -     CBC and differential; Future  -     Comprehensive metabolic panel; Future  -     Lipid Panel with Direct LDL reflex; Future  -     TSH, 3rd generation with Free T4 reflex; Future  -     Hemoglobin A1C; Future    Idiopathic chronic gout without tophus, unspecified site  -     CBC and differential; Future  -     Comprehensive metabolic panel; Future  -     Lipid Panel with Direct LDL reflex; Future  -     TSH, 3rd generation with Free T4 reflex; Future  -     Hemoglobin A1C; Future    Tobacco abuse  -     CBC and differential; Future  -     Comprehensive metabolic panel; Future  -     Lipid Panel with Direct LDL reflex; Future  -     TSH, 3rd generation with Free T4 reflex; Future  -     Hemoglobin A1C; Future    Need for shingles vaccine  -     Zoster Vac Recomb Adjuvanted 50 MCG/0 5ML SUSR;  Inject 1 vial into a muscle once for 1 dose    Gastroesophageal reflux disease without esophagitis

## 2018-12-30 NOTE — ASSESSMENT & PLAN NOTE
Chronic asymptomatic free of exacerbation free of hospitalization and not on any inhaler patient stop it by himself patient does followed by the Pulmonary but he did not see him for while

## 2018-12-30 NOTE — ASSESSMENT & PLAN NOTE
Free of gout chronic asymptomatic patient did stop the by himself the course she seen and also he is taking half dose of the allopurinol and 100 mg

## 2018-12-30 NOTE — ASSESSMENT & PLAN NOTE
Chronic asymptomatic patient does follow with GI the no edema no increase in the weight and no ascites

## 2018-12-30 NOTE — ASSESSMENT & PLAN NOTE
Chronic asymptomatic patient currently taking half dose of the losartan 100 mg will stop the 100 mg of losartan on prescribe him 50 mg of losartan the patient did stop his Lasix  We discussed with the patient low-salt diet and monitor his blood pressure

## 2018-12-30 NOTE — ASSESSMENT & PLAN NOTE
Chronic ,well controlled by omeprazole 20 mg once a day  Discuss with pt important lose weight   Multiple small meal ,avoid eat and lying down ,avoid spicy food and avoid provoked food

## 2019-01-30 ENCOUNTER — OFFICE VISIT (OUTPATIENT)
Dept: NEUROLOGY | Facility: CLINIC | Age: 79
End: 2019-01-30
Payer: COMMERCIAL

## 2019-01-30 VITALS
RESPIRATION RATE: 18 BRPM | BODY MASS INDEX: 26.28 KG/M2 | SYSTOLIC BLOOD PRESSURE: 112 MMHG | HEART RATE: 62 BPM | HEIGHT: 68 IN | DIASTOLIC BLOOD PRESSURE: 70 MMHG | WEIGHT: 173.4 LBS

## 2019-01-30 DIAGNOSIS — G62.89 OTHER SPECIFIED POLYNEUROPATHIES: ICD-10-CM

## 2019-01-30 DIAGNOSIS — G25.0 TREMOR, ESSENTIAL: Primary | ICD-10-CM

## 2019-01-30 PROCEDURE — 99213 OFFICE O/P EST LOW 20 MIN: CPT | Performed by: PSYCHIATRY & NEUROLOGY

## 2019-01-30 NOTE — PATIENT INSTRUCTIONS
Further reduction in propranolol using 80 mg tablets and taking 1/4 tablet daily  If tremor does not increase over the next two weeks then discontinue the propranolol  Please call the office with a status update in four weeks

## 2019-01-30 NOTE — ASSESSMENT & PLAN NOTE
Longstanding history of essential tremor  Has been very much interested in attempting to come off medication as much as possible  He has been able to reduce his propranolol to now only 40 mg once daily with no significant tremor increase  Hopeful to be able to discontinue entirely  --reduce propranolol to 20 mg once daily in a cody [place] --if no tremor increase over the ensuing two weeks to discontinue the propranolol   --have asked that he call the office in four weeks with a status report

## 2019-01-30 NOTE — ASSESSMENT & PLAN NOTE
Confirmed by electrodiagnostic study  Has had a fall formal evaluation and his neuropathy is felt to be secondary to the long-term and heavy the past use of alcohol  No history are evidence on examination to suggest any progression of issues  Continues limited consumption of alcohol but has not obtained

## 2019-01-30 NOTE — PROGRESS NOTES
Patient is here today for his tremors    Patient ID: Derick Bui is a 69 y o  male  Assessment/Plan:    Tremor, essential  Longstanding history of essential tremor  Has been very much interested in attempting to come off medication as much as possible  He has been able to reduce his propranolol to now only 40 mg once daily with no significant tremor increase  Hopeful to be able to discontinue entirely  --reduce propranolol to 20 mg once daily in a m  [place] --if no tremor increase over the ensuing two weeks to discontinue the propranolol   --have asked that he call the office in four weeks with a status report  Other specified polyneuropathies  Confirmed by electrodiagnostic study  Has had a fall formal evaluation and his neuropathy is felt to be secondary to the long-term and heavy the past use of alcohol  No history are evidence on examination to suggest any progression of issues  Continues limited consumption of alcohol but has not obtained  He will follow up in six months or p r n     Subjective:    HPI  The patient, now 69years of age, is followed here with two issues  The 1st is his essential tremor, longstanding  He has been able to slowly reduce the medications used for control and is now down to propranolol at a dose of just 40 mg once daily  Although he still has modest tremor evident, he has had no increase in the presence of tremor and more importantly has not found with a reduction in medication that his tremor has become functionally impairing  He is hopeful of coming off as much in the way of medication as possible  The 2nd issue is his lower extremity sensory axonal polyneuropathy, electrodiagnostically evident and after full evaluation felt secondary to the long-term effects of past heavy alcohol consumption  Describes no symptoms to suggest any progressing issues    Has been able to limit his alcohol consumption to an average of one drink equivalent daily although has not been able to reach full abstinence  Past Medical History:   Diagnosis Date    Anemia     dur to blood loss in the past    BPH (benign prostatic hyperplasia) 2002    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HCC)     Cancer (HCC)     hepatocellular, prostate    Chronic bronchitis (HCC)     Colitis     COPD (chronic obstructive pulmonary disease) (HCC)     chronic bronchitis    Diverticulitis     Essential tremor     ETOH abuse     GERD (gastroesophageal reflux disease)     Gout     Hearing loss     Hemorrhoids     History of Lyme disease     History of thrombocytopenia     chronic mild    History of transfusion     HTN (hypertension)     Hydrocele     Hyperlipidemia     Hypertension     Hyperthyroidism     Irritable bowel syndrome     Liver disease     cirrhosis    Lyme disease     Meningioma (HCC)     Meningioma (HCC)     left frontal- followed by Neurosurgery    Nicotine abuse     2-3 packs    Obstructive sleep apnea     unable to tolerate CPAP    Pressure injury of skin     Stage 3 that healed and has reoccurred    Pulmonary nodule     Sensory polyneuropathy     Sleep apnea     Thyroid nodule     Tobacco abuse     Tremor      Past Surgical History:   Procedure Laterality Date    APPENDECTOMY      CATARACT EXTRACTION      debra    ESOPHAGOGASTRODUODENOSCOPY      HAND SURGERY  04/30/2018    LIVER SURGERY  03/09/2018    WI REMV PILONIDAL LESION SIMPLE N/A 9/28/2018    Procedure: PILONIDAL CYSTECTOMY;  Surgeon: Danette Malone DO;  Location: [facility] MAIN OR;  Service: [facility] THYROID SURGERY      TONSILLECTOMY       Social History     Social History    Marital status:       Spouse name: N/A    Number of children: N/A    Years of education: N/A     Occupational History          Social History Main Topics    Smoking status: Current Every Day Smoker     Packs/day: 1 50     Types: Cigarettes    Smokeless tobacco: Current User      Comment: hx of nicotine abuse 2-3 packs    Alcohol use No    Drug use: No    Sexual activity: Not Asked     Other Topics Concern    None     Social History Narrative    Hx of caffeine abuse    Hx of ETOH abuse     Family History   Problem Relation Age of Onset    Diabetes Father     Other Father         colon problems/colostomy    Alcohol abuse Sister      Allergies   Allergen Reactions    Pneumococcal Vaccine        Current Outpatient Prescriptions:     allopurinol (ZYLOPRIM) 100 mg tablet, Take 1 tablet (100 mg total) by mouth daily (Patient taking differently: Take 50 mg by mouth daily  ), Disp: 90 tablet, Rfl: 2    Cholecalciferol 1000 units CHEW, Chew 5,000 Units daily  , Disp: , Rfl:     losartan (COZAAR) 50 mg tablet, Take 0 5 tablets (25 mg total) by mouth daily, Disp: 30 tablet, Rfl: 2    Omega-3 Fatty Acids (OMEGA-3 FISH OIL PO), Take 4,350 mg by mouth daily  , Disp: , Rfl:     Omeprazole 20 MG TBEC, Take 1 tablet by mouth daily, Disp: , Rfl:     propranolol (INDERAL) 80 mg tablet, Take 1 tablet (80 mg total) by mouth every 12 (twelve) hours (Patient taking differently: Take 40 mg by mouth every 12 (twelve) hours  ), Disp: 60 tablet, Rfl: 0    Copper Gluconate 2 MG TABS, Take 1 tablet by mouth daily  , Disp: , Rfl:     Objective:    Blood pressure 112/70, pulse 62, resp  rate 18, height 5' 8" (1 727 m), weight 78 7 kg (173 lb 6 4 oz)  Physical Exam  Lungs with scattered expiratory wheezes  Rhythm regular  Neurological Exam  Alert  Pleasantly interactive  Fully oriented  Minimal tremor of the outstretched upper extremities, not increasing with extended sustention or with object holding  Normal rest tone  No tone increase or cogwheeling with contralateral distraction maneuver  Full testable lower extremity strength  Again with no pin loss in the distal lower extremities and with retention of position sense at the great toes and improved vibratory appreciation distal lower extremities    Muscle stretch reflexes bilaterally one at the knees and bilaterally absent at the ankles  ROS:    Review of Systems   Constitutional: Negative  Negative for appetite change and fever  HENT: Negative  Negative for hearing loss, tinnitus, trouble swallowing and voice change  Eyes: Negative  Negative for photophobia and pain  Respiratory: Negative  Negative for shortness of breath  Cardiovascular: Negative  Negative for palpitations  Gastrointestinal: Negative  Negative for nausea and vomiting  Endocrine: Negative  Negative for cold intolerance and heat intolerance  Genitourinary: Negative  Negative for dysuria, frequency and urgency  Musculoskeletal: Negative  Negative for myalgias and neck pain  Skin: Negative  Negative for rash  Allergic/Immunologic: Negative  Neurological: Positive for tremors (bilateral hand tremors)  Negative for dizziness, seizures, syncope, facial asymmetry, speech difficulty, weakness, light-headedness, numbness and headaches  Hematological: Negative  Does not bruise/bleed easily  Psychiatric/Behavioral: Negative  Negative for confusion, hallucinations and sleep disturbance  I personally reviewed the ROS that was entered by the medical assistant  *Please note this document was created using voice recognition software and may contain sound-alike word errors  *

## 2019-01-30 NOTE — LETTER
January 30, 2019     Latoya Lisa MD  [address] [facility]  [address]    Patient: Payton Hoover   YOB: 1940   Date of Visit: 1/30/2019       Dear Dr Porfirio Posey: Thank you for referring Meng Thomas to me for evaluation  Below are my notes for this consultation  If you have questions, please do not hesitate to call me  I look forward to following your patient along with you  Sincerely,        Sonia Siegel MD        CC: No Recipients  Sonia Siegel MD  1/30/2019  9:20 AM  Sign at close encounter  Patient is here today for his tremors    Patient ID: Payton Hoover is a 69 y o  male  Assessment/Plan:    Tremor, essential  Longstanding history of essential tremor  Has been very much interested in attempting to come off medication as much as possible  He has been able to reduce his propranolol to now only 40 mg once daily with no significant tremor increase  Hopeful to be able to discontinue entirely  --reduce propranolol to 20 mg once daily in a [facility] --if no tremor increase over the ensuing two weeks to discontinue the propranolol   --have asked that he call the office in four weeks with a status report  Other specified polyneuropathies  Confirmed by electrodiagnostic study  Has had a fall formal evaluation and his neuropathy is felt to be secondary to the long-term and heavy the past use of alcohol  No history are evidence on examination to suggest any progression of issues  Continues limited consumption of alcohol but has not obtained  He will follow up in six months or p r n     Subjective:    HPI  The patient, now 69years of age, is followed here with two issues  The 1st is his essential tremor, longstanding  He has been able to slowly reduce the medications used for control and is now down to propranolol at a dose of just 40 mg once daily    Although he still has modest tremor evident, he has had no increase in the presence of tremor and more importantly has not found with a reduction in medication that his tremor has become functionally impairing  He is hopeful of coming off as much in the way of medication as possible  The 2nd issue is his lower extremity sensory axonal polyneuropathy, electrodiagnostically evident and after full evaluation felt secondary to the long-term effects of past heavy alcohol consumption  Describes no symptoms to suggest any progressing issues  Has been able to limit his alcohol consumption to an average of one drink equivalent daily although has not been able to reach full abstinence      Past Medical History:   Diagnosis Date    Anemia     dur to blood loss in the past    BPH (benign prostatic hyperplasia) 2002    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HCC)     Cancer (HCC)     hepatocellular, prostate    Chronic bronchitis (HCC)     Colitis     COPD (chronic obstructive pulmonary disease) (HCC)     chronic bronchitis    Diverticulitis     Essential tremor     ETOH abuse     GERD (gastroesophageal reflux disease)     Gout     Hearing loss     Hemorrhoids     History of Lyme disease     History of thrombocytopenia     chronic mild    History of transfusion     HTN (hypertension)     Hydrocele     Hyperlipidemia     Hypertension     Hyperthyroidism     Irritable bowel syndrome     Liver disease     cirrhosis    Lyme disease     Meningioma (HCC)     Meningioma (HCC)     left frontal- followed by Neurosurgery    Nicotine abuse     2-3 packs    Obstructive sleep apnea     unable to tolerate CPAP    Pressure injury of skin     Stage 3 that healed and has reoccurred    Pulmonary nodule     Sensory polyneuropathy     Sleep apnea     Thyroid nodule     Tobacco abuse     Tremor      Past Surgical History:   Procedure Laterality Date    APPENDECTOMY      CATARACT EXTRACTION      debra    ESOPHAGOGASTRODUODENOSCOPY      HAND SURGERY  04/30/2018    LIVER SURGERY  03/09/2018    [place] REMV PILONIDAL LESION SIMPLE N/A 9/28/2018    Procedure: PILONIDAL CYSTECTOMY;  Surgeon: Alfredito Burleson DO;  Location: [facility] MAIN OR;  Service: [facility] THYROID SURGERY      TONSILLECTOMY       Social History     Social History    Marital status:      Spouse name: N/A    Number of children: N/A    Years of education: N/A     Occupational History          Social History Main Topics    Smoking status: Current Every Day Smoker     Packs/day: 1 50     Types: Cigarettes    Smokeless tobacco: Current User      Comment: hx of nicotine abuse 2-3 packs    Alcohol use No    Drug use: No    Sexual activity: Not Asked     Other Topics Concern    None     Social History Narrative    Hx of caffeine abuse    Hx of ETOH abuse     Family History   Problem Relation Age of Onset    Diabetes Father     Other Father         colon problems/colostomy    Alcohol abuse Sister      Allergies   Allergen Reactions    Pneumococcal Vaccine        Current Outpatient Prescriptions:     allopurinol (ZYLOPRIM) 100 mg tablet, Take 1 tablet (100 mg total) by mouth daily (Patient taking differently: Take 50 mg by mouth daily  ), Disp: 90 tablet, Rfl: 2    Cholecalciferol 1000 units CHEW, Chew 5,000 Units daily  , Disp: , Rfl:     losartan (COZAAR) 50 mg tablet, Take 0 5 tablets (25 mg total) by mouth daily, Disp: 30 tablet, Rfl: 2    Omega-3 Fatty Acids (OMEGA-3 FISH OIL PO), Take 4,350 mg by mouth daily  , Disp: , Rfl:     Omeprazole 20 MG TBEC, Take 1 tablet by mouth daily, Disp: , Rfl:     propranolol (INDERAL) 80 mg tablet, Take 1 tablet (80 mg total) by mouth every 12 (twelve) hours (Patient taking differently: Take 40 mg by mouth every 12 (twelve) hours  ), Disp: 60 tablet, Rfl: 0    Copper Gluconate 2 MG TABS, Take 1 tablet by mouth daily  , Disp: , Rfl:     Objective:    Blood pressure 112/70, pulse 62, resp  rate 18, height 5' 8" (1 727 m), weight 78 7 kg (173 lb 6 4 oz)      Physical Exam  Lungs with scattered expiratory wheezes  Rhythm regular  Neurological Exam  Alert  Pleasantly interactive  Fully oriented  Minimal tremor of the outstretched upper extremities, not increasing with extended sustention or with object holding  Normal rest tone  No tone increase or cogwheeling with contralateral distraction maneuver  Full testable lower extremity strength  Again with no pin loss in the distal lower extremities and with retention of position sense at the great toes and improved vibratory appreciation distal lower extremities  Muscle stretch reflexes bilaterally one at the knees and bilaterally absent at the ankles  ROS:    Review of Systems   Constitutional: Negative  Negative for appetite change and fever  HENT: Negative  Negative for hearing loss, tinnitus, trouble swallowing and voice change  Eyes: Negative  Negative for photophobia and pain  Respiratory: Negative  Negative for shortness of breath  Cardiovascular: Negative  Negative for palpitations  Gastrointestinal: Negative  Negative for nausea and vomiting  Endocrine: Negative  Negative for cold intolerance and heat intolerance  Genitourinary: Negative  Negative for dysuria, frequency and urgency  Musculoskeletal: Negative  Negative for myalgias and neck pain  Skin: Negative  Negative for rash  Allergic/Immunologic: Negative  Neurological: Positive for tremors (bilateral hand tremors)  Negative for dizziness, seizures, syncope, facial asymmetry, speech difficulty, weakness, light-headedness, numbness and headaches  Hematological: Negative  Does not bruise/bleed easily  Psychiatric/Behavioral: Negative  Negative for confusion, hallucinations and sleep disturbance  I personally reviewed the ROS that was entered by the medical assistant  *Please note this document was created using voice recognition software and may contain sound-alike word errors  *

## 2019-02-14 ENCOUNTER — TELEPHONE (OUTPATIENT)
Dept: OTHER | Facility: OTHER | Age: 79
End: 2019-02-14

## 2019-02-14 ENCOUNTER — OFFICE VISIT (OUTPATIENT)
Dept: FAMILY MEDICINE CLINIC | Facility: CLINIC | Age: 79
End: 2019-02-14
Payer: COMMERCIAL

## 2019-02-14 VITALS
BODY MASS INDEX: 25.61 KG/M2 | WEIGHT: 169 LBS | HEIGHT: 68 IN | TEMPERATURE: 96.8 F | OXYGEN SATURATION: 92 % | DIASTOLIC BLOOD PRESSURE: 70 MMHG | RESPIRATION RATE: 16 BRPM | HEART RATE: 70 BPM | SYSTOLIC BLOOD PRESSURE: 120 MMHG

## 2019-02-14 DIAGNOSIS — C61 MALIGNANT NEOPLASM OF PROSTATE (HCC): ICD-10-CM

## 2019-02-14 DIAGNOSIS — K43.9 VENTRAL HERNIA WITHOUT OBSTRUCTION OR GANGRENE: Primary | ICD-10-CM

## 2019-02-14 PROCEDURE — 1160F RVW MEDS BY RX/DR IN RCRD: CPT | Performed by: FAMILY MEDICINE

## 2019-02-14 PROCEDURE — 99214 OFFICE O/P EST MOD 30 MIN: CPT | Performed by: FAMILY MEDICINE

## 2019-02-14 NOTE — PROGRESS NOTES
Subjective:   Chief Complaint   Patient presents with    Abdominal Pain     x5days when coughs         Patient ID: Lillie Cervantes is a 69 y o  male  Patient here concerned about the abdomen pain his pain and the med of the abdomen above the umbilical but on and pain and just with coughing and deny any nausea no vomiting no diarrhea no constipation no distended of the abdomen no fever and no lose weight patient did have a history of for surgery where he had the incision a vertical and he points the pain at the tip of the the incision and when he cough there is a bulging in the area and no rash or skin discoloration  Patient's history of prostate cancer status post radiation patient asymptomatic deny any increased frequency urination no flank pain no blood in the urine and no dysuria patient continue to follow up with the Urology in yearly basis who ordering the PSA      The following portions of the patient's history were reviewed and updated as appropriate: allergies, current medications, past family history, past medical history, past social history, past surgical history and problem list     Review of Systems   Constitutional: Negative for fatigue and fever  HENT: Negative for ear pain, sinus pressure, sinus pain and sore throat  Eyes: Negative for pain and redness  Respiratory: Negative for cough, chest tightness and shortness of breath  Cardiovascular: Negative for chest pain, palpitations and leg swelling  Gastrointestinal: Positive for abdominal pain  Negative for blood in stool, constipation, diarrhea and nausea  Genitourinary: Negative for flank pain, frequency and hematuria  Musculoskeletal: Negative for back pain and joint swelling  Skin: Negative for rash  Neurological: Negative for dizziness, numbness and headaches  Hematological: Does not bruise/bleed easily           Objective:  Vitals:    02/14/19 1443   BP: 120/70   BP Location: Left arm   Patient Position: Sitting   Cuff Size: Large   Pulse: 70   Resp: 16   Temp: (!) 96 8 °F (36 °C)   TempSrc: Oral   SpO2: 92%   Weight: 76 7 kg (169 lb)   Height: 5' 8" (1 727 m)      Physical Exam   Constitutional: He is oriented to person, place, and time  He appears well-developed and well-nourished  HENT:   Head: Normocephalic  Right Ear: External ear normal    Left Ear: External ear normal    Eyes: Conjunctivae and EOM are normal  Right eye exhibits no discharge  Left eye exhibits no discharge  Neck: No JVD present  Cardiovascular: Normal rate, regular rhythm and normal heart sounds  Exam reveals no gallop  No murmur heard  Pulmonary/Chest: Effort normal  No respiratory distress  He has no wheezes  He has no rales  He exhibits no tenderness  Abdominal: Bowel sounds are normal  He exhibits no distension, no ascites, no pulsatile midline mass and no mass  There is no tenderness  There is no rebound  Bulging above umbilical area with increased pressure in the abdomen with coughing   Musculoskeletal: He exhibits no edema or tenderness  Neurological: He is alert and oriented to person, place, and time  Skin: No rash noted  No erythema           Assessment/Plan:    Malignant neoplasm of prostate (HCC)  Chronic s/p radiation Asymptomatic ,per patient does see Urology once/year    Ventral hernia without obstruction or gangrene  A new onset the finding on the exam the a ventral hernia at the site of the surgical incision and plan for CT scan of the abdomen and pelvic without contrast the discussed with the patient avoid lifting a heavy stuff on coughing to supportive the area we recommend to use be seen by surgeon for surgical repair and patient he will hold on that       Diagnoses and all orders for this visit:    Ventral hernia without obstruction or gangrene  -     CT abdomen pelvis w wo contrast; Future    Malignant neoplasm of prostate ([address] )

## 2019-02-15 NOTE — ASSESSMENT & PLAN NOTE
A new onset the finding on the exam the a ventral hernia at the site of the surgical incision and plan for CT scan of the abdomen and pelvic without contrast the discussed with the patient avoid lifting a heavy stuff on coughing to supportive the area we recommend to use be seen by surgeon for surgical repair and patient he will hold on that

## 2019-04-24 LAB — HBA1C MFR BLD HPLC: 5.6 %

## 2019-05-14 DIAGNOSIS — I10 ESSENTIAL HYPERTENSION: ICD-10-CM

## 2019-05-14 RX ORDER — LOSARTAN POTASSIUM 50 MG/1
25 TABLET ORAL DAILY
Qty: 30 TABLET | Refills: 0 | Status: SHIPPED | OUTPATIENT
Start: 2019-05-14 | End: 2019-09-25 | Stop reason: DRUGHIGH

## 2019-05-24 ENCOUNTER — OFFICE VISIT (OUTPATIENT)
Dept: FAMILY MEDICINE CLINIC | Facility: CLINIC | Age: 79
End: 2019-05-24
Payer: COMMERCIAL

## 2019-05-24 VITALS
HEART RATE: 82 BPM | SYSTOLIC BLOOD PRESSURE: 122 MMHG | BODY MASS INDEX: 26.07 KG/M2 | WEIGHT: 172 LBS | RESPIRATION RATE: 16 BRPM | DIASTOLIC BLOOD PRESSURE: 78 MMHG | TEMPERATURE: 97.1 F | OXYGEN SATURATION: 95 % | HEIGHT: 68 IN

## 2019-05-24 DIAGNOSIS — Z23 NEED FOR PNEUMOCOCCAL VACCINATION: ICD-10-CM

## 2019-05-24 DIAGNOSIS — R73.01 IMPAIRED FASTING GLUCOSE: ICD-10-CM

## 2019-05-24 DIAGNOSIS — K21.9 GASTROESOPHAGEAL REFLUX DISEASE WITHOUT ESOPHAGITIS: ICD-10-CM

## 2019-05-24 DIAGNOSIS — I10 ESSENTIAL HYPERTENSION: ICD-10-CM

## 2019-05-24 DIAGNOSIS — Z00.01 ENCOUNTER FOR WELL ADULT EXAM WITH ABNORMAL FINDINGS: Primary | ICD-10-CM

## 2019-05-24 DIAGNOSIS — C22.0 HEPATOCELLULAR CARCINOMA (HCC): ICD-10-CM

## 2019-05-24 DIAGNOSIS — J44.9 CHRONIC OBSTRUCTIVE PULMONARY DISEASE, UNSPECIFIED COPD TYPE (HCC): ICD-10-CM

## 2019-05-24 DIAGNOSIS — F17.200 TOBACCO DEPENDENCE SYNDROME: ICD-10-CM

## 2019-05-24 PROBLEM — L89.153 DECUBITUS ULCER OF SACRAL REGION, STAGE 3 (HCC): Status: RESOLVED | Noted: 2018-03-12 | Resolved: 2019-05-24

## 2019-05-24 PROCEDURE — 3074F SYST BP LT 130 MM HG: CPT | Performed by: FAMILY MEDICINE

## 2019-05-24 PROCEDURE — 1160F RVW MEDS BY RX/DR IN RCRD: CPT | Performed by: FAMILY MEDICINE

## 2019-05-24 PROCEDURE — 1125F AMNT PAIN NOTED PAIN PRSNT: CPT | Performed by: FAMILY MEDICINE

## 2019-05-24 PROCEDURE — 4004F PT TOBACCO SCREEN RCVD TLK: CPT | Performed by: FAMILY MEDICINE

## 2019-05-24 PROCEDURE — 99214 OFFICE O/P EST MOD 30 MIN: CPT | Performed by: FAMILY MEDICINE

## 2019-05-24 PROCEDURE — 3078F DIAST BP <80 MM HG: CPT | Performed by: FAMILY MEDICINE

## 2019-05-24 PROCEDURE — 90670 PCV13 VACCINE IM: CPT | Performed by: FAMILY MEDICINE

## 2019-05-24 PROCEDURE — G0009 ADMIN PNEUMOCOCCAL VACCINE: HCPCS | Performed by: FAMILY MEDICINE

## 2019-05-24 PROCEDURE — 1170F FXNL STATUS ASSESSED: CPT | Performed by: FAMILY MEDICINE

## 2019-05-24 PROCEDURE — 4040F PNEUMOC VAC/ADMIN/RCVD: CPT | Performed by: FAMILY MEDICINE

## 2019-05-24 PROCEDURE — G0439 PPPS, SUBSEQ VISIT: HCPCS | Performed by: FAMILY MEDICINE

## 2019-05-24 PROCEDURE — 3725F SCREEN DEPRESSION PERFORMED: CPT | Performed by: FAMILY MEDICINE

## 2019-09-17 LAB
CREAT ?TM UR-SCNC: 164 UMOL/L
EXT MICROALBUMIN URINE RANDOM: 7.9
HBA1C MFR BLD HPLC: 5.1 %
MICROALBUMIN/CREAT UR: 48.2 MG/G{CREAT}

## 2019-09-18 DIAGNOSIS — M10.9 GOUT, UNSPECIFIED CAUSE, UNSPECIFIED CHRONICITY, UNSPECIFIED SITE: ICD-10-CM

## 2019-09-18 RX ORDER — ALLOPURINOL 100 MG/1
100 TABLET ORAL DAILY
Qty: 90 TABLET | Refills: 0 | Status: SHIPPED | OUTPATIENT
Start: 2019-09-18 | End: 2019-09-25 | Stop reason: DRUGHIGH

## 2019-09-25 ENCOUNTER — OFFICE VISIT (OUTPATIENT)
Dept: FAMILY MEDICINE CLINIC | Facility: CLINIC | Age: 79
End: 2019-09-25

## 2019-09-25 VITALS
WEIGHT: 164 LBS | HEART RATE: 78 BPM | HEIGHT: 68 IN | TEMPERATURE: 97.8 F | SYSTOLIC BLOOD PRESSURE: 146 MMHG | OXYGEN SATURATION: 95 % | BODY MASS INDEX: 24.86 KG/M2 | DIASTOLIC BLOOD PRESSURE: 80 MMHG

## 2019-09-25 DIAGNOSIS — F17.200 TOBACCO DEPENDENCE SYNDROME: ICD-10-CM

## 2019-09-25 DIAGNOSIS — D72.819 LEUKOPENIA, UNSPECIFIED TYPE: Primary | ICD-10-CM

## 2019-09-25 DIAGNOSIS — Z23 NEED FOR INFLUENZA VACCINATION: ICD-10-CM

## 2019-09-25 DIAGNOSIS — K21.9 GASTROESOPHAGEAL REFLUX DISEASE WITHOUT ESOPHAGITIS: ICD-10-CM

## 2019-09-25 DIAGNOSIS — R91.1 PULMONARY NODULE: ICD-10-CM

## 2019-09-25 DIAGNOSIS — J44.9 CHRONIC OBSTRUCTIVE PULMONARY DISEASE, UNSPECIFIED COPD TYPE (HCC): ICD-10-CM

## 2019-09-25 DIAGNOSIS — D72.818 OTHER DECREASED WHITE BLOOD CELL (WBC) COUNT: ICD-10-CM

## 2019-09-25 PROCEDURE — 99214 OFFICE O/P EST MOD 30 MIN: CPT | Performed by: FAMILY MEDICINE

## 2019-09-25 PROCEDURE — G0008 ADMIN INFLUENZA VIRUS VAC: HCPCS | Performed by: FAMILY MEDICINE

## 2019-09-25 PROCEDURE — 90662 IIV NO PRSV INCREASED AG IM: CPT | Performed by: FAMILY MEDICINE

## 2019-09-25 RX ORDER — ALBUTEROL SULFATE 90 UG/1
2 AEROSOL, METERED RESPIRATORY (INHALATION) EVERY 6 HOURS PRN
Qty: 1 INHALER | Refills: 1 | Status: SHIPPED | OUTPATIENT
Start: 2019-09-25 | End: 2020-12-02 | Stop reason: SDUPTHER

## 2019-09-25 RX ORDER — LOSARTAN POTASSIUM 50 MG/1
50 TABLET ORAL DAILY
COMMUNITY
End: 2019-10-06 | Stop reason: SDUPTHER

## 2019-09-25 RX ORDER — ALLOPURINOL 100 MG/1
50 TABLET ORAL 2 TIMES DAILY
COMMUNITY
End: 2020-02-28 | Stop reason: ALTCHOICE

## 2019-09-25 RX ORDER — PROPRANOLOL HYDROCHLORIDE 80 MG/1
40 TABLET ORAL 2 TIMES DAILY
COMMUNITY
End: 2019-09-25 | Stop reason: SDDI

## 2019-09-25 RX ORDER — PROPRANOLOL HYDROCHLORIDE 80 MG/1
80 TABLET ORAL 2 TIMES DAILY
COMMUNITY
End: 2019-09-25 | Stop reason: DRUGHIGH

## 2019-09-25 NOTE — ASSESSMENT & PLAN NOTE
Chronic recent CT scan has been order by his GI  done in September 2019 review with the patient he still have a nodule on the lung will recommend patient to to establish with his pulmonary he did not see him for more than 2-3 years

## 2019-09-25 NOTE — ASSESSMENT & PLAN NOTE
A chronic asymptomatic fair control continue with omeprazole 20 mg once a day proper use of medication possible side effect discussed with the patient will recommend to stop smoking and stop drinking alcohol will recommend the avoid provoke food

## 2019-09-25 NOTE — ASSESSMENT & PLAN NOTE
A chronic asymptomatic no recent exacerbation no recent hospitalization patient currently not on any inhaler recommend the patient to keep albuterol inhaler had at home to use it as needed

## 2019-09-25 NOTE — ASSESSMENT & PLAN NOTE
New diagnosis asymptomatic we discussed the patient case fever to call the office will do workup including CBC with diff check Y18 folic acid serum protein electrophoresis and urine protein electrophoresis workup discussed with the patient

## 2019-09-25 NOTE — ASSESSMENT & PLAN NOTE
Chronic uncontrolled patient he does not have intention to stop smoking and to sedate smoking he declined any help to help him stop smoking he is aware of the complication of the smoking

## 2019-09-25 NOTE — PROGRESS NOTES
Subjective:   Chief Complaint   Patient presents with    Follow-up     chronic conditions        Patient ID: Terrie Allen is a 69 y o  male  Patient and office follow-up with a chronic condition patient was history of the the GERD on omeprazole 20 mg once a day tolerated well without any side effect and deny any heartburn no abdomen pain nausea vomiting no diarrhea no weight change and patient was history of COPD currently not on any inhaler he deny any cough wheezing no short of breath no dyspnea on exertion no recent exacerbation no recent hospitalization patient w with history of pulmonary nodule patient did have a recently CT scan of the chest order by the GI and show he still have pulmonary nodule and the patient has been not follow with his pulmonary will recommend to go back to follow up with them  Recent blood work discussed with the patient show his white blood cell is low he deny any fever chills no weight change in and then no fatigue      The following portions of the patient's history were reviewed and updated as appropriate: allergies, current medications, past family history, past medical history, past social history, past surgical history and problem list     Review of Systems   Constitutional: Negative for fatigue and fever  HENT: Negative for ear pain, sinus pressure, sinus pain and sore throat  Eyes: Negative for pain and redness  Respiratory: Negative for cough, chest tightness and shortness of breath  Cardiovascular: Negative for chest pain, palpitations and leg swelling  Gastrointestinal: Negative for abdominal pain, blood in stool, constipation, diarrhea and nausea  Genitourinary: Negative for flank pain, frequency and hematuria  Musculoskeletal: Negative for back pain and joint swelling  Skin: Negative for rash  Neurological: Negative for dizziness, numbness and headaches  Hematological: Does not bruise/bleed easily           Objective:  Vitals:    09/25/19 0944 BP: 146/80   Pulse: 78   Temp: 97 8 °F (36 6 °C)   TempSrc: Tympanic   SpO2: 95%   Weight: 74 4 kg (164 lb)   Height: 5' 8" (1 727 m)      Physical Exam   Constitutional: He is oriented to person, place, and time  He appears well-developed and well-nourished  HENT:   Head: Normocephalic  Right Ear: External ear normal    Left Ear: External ear normal    Eyes: Conjunctivae and EOM are normal  Right eye exhibits no discharge  Left eye exhibits no discharge  Neck: No JVD present  Cardiovascular: Normal rate, regular rhythm and normal heart sounds  Exam reveals no gallop  No murmur heard  Pulmonary/Chest: Effort normal  No respiratory distress  He has no wheezes  He has no rales  He exhibits no tenderness  Abdominal: He exhibits no mass  There is no tenderness  There is no rebound  Musculoskeletal: He exhibits no edema or tenderness  Neurological: He is alert and oriented to person, place, and time  Skin: No rash noted  No erythema           Assessment/Plan:    Leucopenia  New diagnosis asymptomatic we discussed the patient case fever to call the office will do workup including CBC with diff check U94 folic acid serum protein electrophoresis and urine protein electrophoresis workup discussed with the patient    Pulmonary nodule  Chronic recent CT scan has been order by his GI  done in September 2019 review with the patient he still have a nodule on the lung will recommend patient to to establish with his pulmonary he did not see him for more than 2-3 years    Tobacco dependence syndrome  Chronic uncontrolled patient he does not have intention to stop smoking and to sedate smoking he declined any help to help him stop smoking he is aware of the complication of the smoking    Gastroesophageal reflux disease  A chronic asymptomatic fair control continue with omeprazole 20 mg once a day proper use of medication possible side effect discussed with the patient will recommend to stop smoking and stop drinking alcohol will recommend the avoid provoke food    COPD (chronic obstructive pulmonary disease) (HCC)  A chronic asymptomatic no recent exacerbation no recent hospitalization patient currently not on any inhaler recommend the patient to keep albuterol inhaler had at home to use it as needed       Diagnoses and all orders for this visit:    Leukopenia, unspecified type  -     CBC and differential; Future  -     Vitamin B12; Future  -     Folate; Future  -     Protein electrophoresis, urine  -     Protein electrophoresis, serum; Future    Gastroesophageal reflux disease without esophagitis    Tobacco dependence syndrome    Chronic obstructive pulmonary disease, unspecified COPD type (HCC)  -     albuterol (VENTOLIN HFA) 90 mcg/act inhaler; Inhale 2 puffs every 6 (six) hours as needed for wheezing    Pulmonary nodule    Other decreased white blood cell (WBC) count  -     CBC and differential; Future  -     Vitamin B12; Future  -     Folate; Future  -     Protein electrophoresis, urine  -     Protein electrophoresis, serum; Future    Need for influenza vaccination  -     FLUZONE HIGH-DOSE: influenza vaccine, high-dose, preservative-free 0 5 mL    Other orders  -     propranolol (INDERAL) 80 mg tablet;  Take 80 mg by mouth 2 (two) times a day

## 2019-10-05 DIAGNOSIS — I10 ESSENTIAL HYPERTENSION: Primary | ICD-10-CM

## 2019-10-06 RX ORDER — LOSARTAN POTASSIUM 50 MG/1
50 TABLET ORAL DAILY
Qty: 30 TABLET | Refills: 1 | Status: SHIPPED | OUTPATIENT
Start: 2019-10-06 | End: 2019-12-30 | Stop reason: SDUPTHER

## 2019-10-22 PROBLEM — K70.30 ALCOHOLIC CIRRHOSIS OF LIVER (HCC): Status: ACTIVE | Noted: 2018-08-23

## 2019-10-22 PROBLEM — K70.30 ALCOHOLIC CIRRHOSIS OF LIVER (HCC): Status: ACTIVE | Noted: 2019-10-22

## 2019-10-25 ENCOUNTER — OFFICE VISIT (OUTPATIENT)
Dept: FAMILY MEDICINE CLINIC | Facility: CLINIC | Age: 79
End: 2019-10-25
Payer: COMMERCIAL

## 2019-10-25 VITALS
DIASTOLIC BLOOD PRESSURE: 84 MMHG | HEART RATE: 74 BPM | TEMPERATURE: 98.7 F | RESPIRATION RATE: 16 BRPM | WEIGHT: 164 LBS | OXYGEN SATURATION: 95 % | HEIGHT: 68 IN | SYSTOLIC BLOOD PRESSURE: 142 MMHG | BODY MASS INDEX: 24.86 KG/M2

## 2019-10-25 DIAGNOSIS — R73.01 IMPAIRED FASTING GLUCOSE: Primary | ICD-10-CM

## 2019-10-25 DIAGNOSIS — I10 ESSENTIAL HYPERTENSION: ICD-10-CM

## 2019-10-25 DIAGNOSIS — M47.816 SPONDYLOSIS OF LUMBAR REGION WITHOUT MYELOPATHY OR RADICULOPATHY: ICD-10-CM

## 2019-10-25 DIAGNOSIS — D69.6 THROMBOCYTOPENIA (HCC): ICD-10-CM

## 2019-10-25 DIAGNOSIS — R91.1 PULMONARY NODULE: ICD-10-CM

## 2019-10-25 DIAGNOSIS — K70.30 ALCOHOLIC CIRRHOSIS OF LIVER WITHOUT ASCITES (HCC): ICD-10-CM

## 2019-10-25 PROCEDURE — 99214 OFFICE O/P EST MOD 30 MIN: CPT | Performed by: FAMILY MEDICINE

## 2019-10-25 RX ORDER — BLOOD PRESSURE TEST KIT
KIT MISCELLANEOUS DAILY
Qty: 1 EACH | Refills: 0 | Status: SHIPPED | OUTPATIENT
Start: 2019-10-25 | End: 2020-07-07 | Stop reason: ALTCHOICE

## 2019-10-25 NOTE — ASSESSMENT & PLAN NOTE
recent CT scan of the chest the a new sub pleural nodule 3 mm on the and nodule on the anterior medial pleural 1 2 cm and also on the right middle loop there is 2  0 cm nodule increased from before  Recommend to be seen by his pulmonary

## 2019-10-25 NOTE — PROGRESS NOTES
Subjective:   Chief Complaint   Patient presents with    Follow-up     chronic conditions        Patient ID: Melanie Reilly is a 69 y o  male  The patient office follow up with a chronic condition patient was history of hypertension blood pressure fair control on current medication tolerated well without any side effect he deny any chest pain short of breath no palpitation no dyspnea on exertion and no lower extremity edema patient history of impaired fasting glucose the try to controlled with the low carb diet deny increased thirsty increased frequency urination no dizziness no headache and no abdomen pain patient was history of the alcohol cirrhosis of the liver his liver enzyme current blood work is normal patient continued to drink despite he is aware of the complication the problem with drinking he deny any abdomen pain no nausea no vomiting no blood in the stool and no weight change patient does follow up with the GI periodically patient and who known to have history of the long standing smoking and the pulmonary nodule recent CT scan of the lung it show he had multiple nodule and there is recent change in the size any new finding patient deny any wheezing no cough no short of breath no dyspnea on exertion the he continued to smoke  Recent blood work discussed with the patient      The following portions of the patient's history were reviewed and updated as appropriate: allergies, current medications, past family history, past medical history, past social history, past surgical history and problem list     Review of Systems   Constitutional: Negative for fatigue and fever  HENT: Negative for ear pain, sinus pressure, sinus pain and sore throat  Eyes: Negative for pain and redness  Respiratory: Negative for cough, chest tightness and shortness of breath  Cardiovascular: Negative for chest pain, palpitations and leg swelling     Gastrointestinal: Negative for abdominal pain, blood in stool, constipation, diarrhea and nausea  Genitourinary: Negative for flank pain, frequency and hematuria  Musculoskeletal: Negative for back pain and joint swelling  Skin: Negative for rash  Neurological: Negative for dizziness, numbness and headaches  Hematological: Does not bruise/bleed easily  Objective:  Vitals:    10/25/19 1007   BP: 142/84   BP Location: Left arm   Patient Position: Sitting   Cuff Size: Adult   Pulse: 74   Resp: 16   Temp: 98 7 °F (37 1 °C)   TempSrc: Tympanic   SpO2: 95%   Weight: 74 4 kg (164 lb)   Height: 5' 8" (1 727 m)      Physical Exam   Constitutional: He is oriented to person, place, and time  He appears well-developed and well-nourished  HENT:   Head: Normocephalic  Right Ear: External ear normal    Left Ear: External ear normal    Eyes: Conjunctivae and EOM are normal  Right eye exhibits no discharge  Left eye exhibits no discharge  Neck: No JVD present  Cardiovascular: Normal rate, regular rhythm and normal heart sounds  Exam reveals no gallop  No murmur heard  Pulmonary/Chest: Effort normal  No respiratory distress  He has no wheezes  He has no rales  He exhibits no tenderness  Abdominal: He exhibits no mass  There is no tenderness  There is no rebound  Musculoskeletal: He exhibits no edema or tenderness  Neurological: He is alert and oriented to person, place, and time  Skin: No rash noted  No erythema           Assessment/Plan:    Pulmonary nodule   recent CT scan of the chest the a new sub pleural nodule 3 mm on the and nodule on the anterior medial pleural 1 2 cm and also on the right middle loop there is 2  0 cm nodule increased from before  Recommend to be seen by his pulmonary    Impaired fasting glucose  Chronic asymptomatic fair control continue low carb diet    Hypertension  Chronic asymptomatic fair control encouraged patient to continue current medication low-salt diet increase physical activity discussed with the patient    Alcoholic cirrhosis of liver (HCC)  A chronic asymptomatic liver enzymes normal patient continued to drink patient does follow up with GI       Diagnoses and all orders for this visit:    Impaired fasting glucose  -     CBC and differential; Future  -     Basic metabolic panel; Future  -     Lipid panel; Future  -     TSH, 3rd generation with Free T4 reflex; Future    Alcoholic cirrhosis of liver without ascites (HCC)  -     CBC and differential; Future  -     Basic metabolic panel; Future  -     Lipid panel; Future  -     TSH, 3rd generation with Free T4 reflex; Future    Essential hypertension  -     Blood Pressure KIT; by Does not apply route daily  -     CBC and differential; Future  -     Basic metabolic panel; Future  -     Lipid panel; Future  -     TSH, 3rd generation with Free T4 reflex; Future    Pulmonary nodule    Thrombocytopenia (HCC)  -     Blood Pressure KIT; by Does not apply route daily  -     CBC and differential; Future  -     Basic metabolic panel; Future  -     Lipid panel; Future  -     TSH, 3rd generation with Free T4 reflex;  Future    Spondylosis of lumbar region without myelopathy or radiculopathy

## 2019-10-28 NOTE — ASSESSMENT & PLAN NOTE
A chronic asymptomatic liver enzymes normal patient continued to drink patient does follow up with GI

## 2019-10-28 NOTE — ASSESSMENT & PLAN NOTE
Chronic asymptomatic fair control encouraged patient to continue current medication low-salt diet increase physical activity discussed with the patient

## 2019-12-30 ENCOUNTER — TELEPHONE (OUTPATIENT)
Dept: FAMILY MEDICINE CLINIC | Facility: CLINIC | Age: 79
End: 2019-12-30

## 2019-12-30 DIAGNOSIS — I10 ESSENTIAL HYPERTENSION: ICD-10-CM

## 2019-12-30 RX ORDER — LOSARTAN POTASSIUM 50 MG/1
50 TABLET ORAL DAILY
Qty: 30 TABLET | Refills: 2 | Status: SHIPPED | OUTPATIENT
Start: 2019-12-30 | End: 2020-04-01

## 2019-12-30 NOTE — TELEPHONE ENCOUNTER
Patient used to be on Propranolol 80 mg in past and stopped taking it He would like to restart medication   He started taking some he had left over and would like a refill sent to his pharmacy  please advise

## 2019-12-31 DIAGNOSIS — G25.0 TREMOR, ESSENTIAL: Primary | ICD-10-CM

## 2019-12-31 RX ORDER — PROPRANOLOL HYDROCHLORIDE 80 MG/1
80 TABLET ORAL DAILY
Qty: 30 TABLET | Refills: 2 | Status: SHIPPED | OUTPATIENT
Start: 2019-12-31 | End: 2020-04-01

## 2020-02-28 ENCOUNTER — OFFICE VISIT (OUTPATIENT)
Dept: FAMILY MEDICINE CLINIC | Facility: CLINIC | Age: 80
End: 2020-02-28
Payer: COMMERCIAL

## 2020-02-28 VITALS
HEART RATE: 63 BPM | BODY MASS INDEX: 24.86 KG/M2 | WEIGHT: 164 LBS | HEIGHT: 68 IN | TEMPERATURE: 96.8 F | SYSTOLIC BLOOD PRESSURE: 130 MMHG | DIASTOLIC BLOOD PRESSURE: 80 MMHG

## 2020-02-28 DIAGNOSIS — R73.01 IMPAIRED FASTING GLUCOSE: ICD-10-CM

## 2020-02-28 DIAGNOSIS — C22.0 HEPATOCELLULAR CARCINOMA (HCC): ICD-10-CM

## 2020-02-28 DIAGNOSIS — F17.200 TOBACCO DEPENDENCE SYNDROME: ICD-10-CM

## 2020-02-28 DIAGNOSIS — C61 MALIGNANT NEOPLASM OF PROSTATE (HCC): ICD-10-CM

## 2020-02-28 DIAGNOSIS — D69.6 THROMBOCYTOPENIA (HCC): ICD-10-CM

## 2020-02-28 DIAGNOSIS — K70.30 ALCOHOLIC CIRRHOSIS OF LIVER WITHOUT ASCITES (HCC): ICD-10-CM

## 2020-02-28 DIAGNOSIS — I10 ESSENTIAL HYPERTENSION: Primary | ICD-10-CM

## 2020-02-28 DIAGNOSIS — J44.9 CHRONIC OBSTRUCTIVE PULMONARY DISEASE, UNSPECIFIED COPD TYPE (HCC): ICD-10-CM

## 2020-02-28 PROCEDURE — 99214 OFFICE O/P EST MOD 30 MIN: CPT | Performed by: FAMILY MEDICINE

## 2020-02-28 PROCEDURE — 3075F SYST BP GE 130 - 139MM HG: CPT | Performed by: FAMILY MEDICINE

## 2020-02-28 PROCEDURE — 3079F DIAST BP 80-89 MM HG: CPT | Performed by: FAMILY MEDICINE

## 2020-02-28 PROCEDURE — 4040F PNEUMOC VAC/ADMIN/RCVD: CPT | Performed by: FAMILY MEDICINE

## 2020-02-28 PROCEDURE — 3008F BODY MASS INDEX DOCD: CPT | Performed by: FAMILY MEDICINE

## 2020-02-28 PROCEDURE — 4004F PT TOBACCO SCREEN RCVD TLK: CPT | Performed by: FAMILY MEDICINE

## 2020-02-28 PROCEDURE — 1160F RVW MEDS BY RX/DR IN RCRD: CPT | Performed by: FAMILY MEDICINE

## 2020-02-28 RX ORDER — BUDESONIDE AND FORMOTEROL FUMARATE DIHYDRATE 160; 4.5 UG/1; UG/1
AEROSOL RESPIRATORY (INHALATION)
COMMUNITY
End: 2022-03-25 | Stop reason: SDUPTHER

## 2020-02-28 NOTE — ASSESSMENT & PLAN NOTE
Chronic asymptomatic no recent hospitalization or exacerbation patient not on any inhaler and he declined to use a and any medication he had the albuterol to use it on p r n   Basis

## 2020-02-28 NOTE — ASSESSMENT & PLAN NOTE
Chronic uncontrolled patient has been smoking for more than 30 years and he had a CT scan of the chest recently in December 2019 result discussed with the patient patient cut down in the smoking from 2 pack to 1 pack a day and he is not ready to quit smoking working on cutting down slowly patient aware of the complication of smoking

## 2020-02-28 NOTE — ASSESSMENT & PLAN NOTE
Chronic a improved in BMI and encouraged patient to continue watch for the low carb diet portion control increased physical activity

## 2020-02-28 NOTE — ASSESSMENT & PLAN NOTE
Chronic asymptomatic fair control continue current medication low-salt diet discussed with the patient

## 2020-02-28 NOTE — ASSESSMENT & PLAN NOTE
A chronic asymptomatic platelet number is stable patient aware of the condition and risk of bleeding

## 2020-02-28 NOTE — ASSESSMENT & PLAN NOTE
Chronic asymptomatic fair control encouraged patient to lose weight and low carb diet discussed with the patient

## 2020-02-28 NOTE — PROGRESS NOTES
BMI Counseling: Body mass index is 25 31 kg/m²  The BMI is above normal  Nutrition recommendations include decreasing portion sizes and encouraging healthy choices of fruits and vegetables  Exercise recommendations include exercising 3-5 times per week  Tobacco Cessation Counseling: Tobacco cessation counseling was provided  The patient is sincerely urged to quit consumption of tobacco  He is not ready to quit tobacco        Subjective:   Chief Complaint   Patient presents with    Follow-up     chronic conditions        Patient ID: Maria Del Carmen Venegas is a 67 y o  male  Patient here follow-up with a chronic condition patient history of hypertension blood pressure fair control on current medication tolerated well without any side effect deny any chest pain short of breath no palpitation no dyspnea on exertion and no lower extremity edema patient's history of impaired fasting glucose try to controlled with the low carb diet deny increased thirsty increased frequency urination no dizziness no headache and no abdomen pain patient history of COPD secondary to long time smoking he is declining to stop smoking he cut down from 2 pack to 1 pack a day deny any cough wheezing no hematosis no dyspnea on exertion no recent exacerbation or hospitalization patient's history of the hepatocellular carcinoma and cirrhosis of the liver secondary to alcohol patient does follow up with GI periodically deny any abdomen pain no abdomen distension no nausea no vomiting no blood in the stool no jaundice recent blood work discussed with the patient      The following portions of the patient's history were reviewed and updated as appropriate: allergies, current medications, past family history, past medical history, past social history, past surgical history and problem list     Review of Systems   Constitutional: Negative for fatigue and fever  HENT: Negative for ear pain, sinus pressure, sinus pain and sore throat      Eyes: Negative for pain and redness  Respiratory: Negative for cough, chest tightness and shortness of breath  Cardiovascular: Negative for chest pain, palpitations and leg swelling  Gastrointestinal: Negative for abdominal pain, blood in stool, constipation, diarrhea and nausea  Genitourinary: Negative for flank pain, frequency and hematuria  Musculoskeletal: Negative for back pain and joint swelling  Skin: Negative for rash  Neurological: Negative for dizziness, numbness and headaches  Hematological: Does not bruise/bleed easily  Objective:  Vitals:    02/28/20 1000 02/28/20 1021   BP: 150/80 130/80   Pulse: 63    Temp: (!) 96 8 °F (36 °C)    TempSrc: Tympanic    Weight: 74 4 kg (164 lb)    Height: 5' 7 5" (1 715 m)       Physical Exam   Constitutional: He is oriented to person, place, and time  He appears well-developed and well-nourished  HENT:   Head: Normocephalic  Right Ear: External ear normal    Left Ear: External ear normal    Eyes: Conjunctivae and EOM are normal  Right eye exhibits no discharge  Left eye exhibits no discharge  Neck: No JVD present  Cardiovascular: Normal rate, regular rhythm and normal heart sounds  Exam reveals no gallop  No murmur heard  Pulmonary/Chest: Effort normal  No respiratory distress  He has wheezes  He has no rales  He exhibits no tenderness  wheezing in B/L lower lung   Abdominal: He exhibits no mass  There is no tenderness  There is no rebound  Musculoskeletal: He exhibits no edema or tenderness  Neurological: He is alert and oriented to person, place, and time  Skin: No rash noted  No erythema           Assessment/Plan:    Hypertension  Chronic asymptomatic fair control continue current medication low-salt diet discussed with the patient    [facility]  Chronic asymptomatic no recent hospitalization or exacerbation patient not on any inhaler and he declined to use a and any medication he had the albuterol to use it on p r n   Basis    Impaired fasting glucose  Chronic asymptomatic fair control encouraged patient to lose weight and low carb diet discussed with the patient    [facility] )  Chronic asymptomatic patient continue to have alcohol drink but less than before    Hepatocellular carcinoma ([facility] )  A status post surgery patient does follow up with the GI periodically    Thrombocytopenia ([facility] )  A chronic asymptomatic platelet number is stable patient aware of the condition and risk of bleeding    Malign[facility]oplasm [facility] )  Chronic asymptomatic status post the radiation patient does follow up with the Urology periodically    BMI 25 0-25 9,adult  Chronic a improved in BMI and encouraged patient to continue watch for the low carb diet portion control increased physical activity    Tobacco dependence syndrome  Chronic uncontrolled patient has been smoking for more than 30 years and he had a CT scan of the chest recently in December 2019 result discussed with the patient patient cut down in the smoking from 2 pack to 1 pack a day and he is not ready to quit smoking working on cutting down slowly patient aware of the complication of smoking       Diagnoses and all orders for this visit:    Essential hypertension    Hepatocellular carcinoma ([facility] )    Alcoholic cirrhosis of liver without ascites ([facility] )    Chronic obstructive pulmonary disease, unspecified COPD type ([facility] )    Thrombocytopenia ([facility]  )    Impaired fasting glucose    BMI 25 0-25 9,adult    Malignant neoplasm of prostate (HCC)    Tobacco dependence syndrome    Other orders  -     budesonide-formoterol (Symbicort) 160-4 5 mcg/act inhaler; Symbicort 160 mcg-4 5 mcg/actuation HFA aerosol inhaler   INHALE 2 PUFF BY INHALATION ROUTE 2 TIMES EVERY DAY IN THE MORNING AND EVENING

## 2020-04-01 DIAGNOSIS — I10 ESSENTIAL HYPERTENSION: ICD-10-CM

## 2020-04-01 DIAGNOSIS — G25.0 TREMOR, ESSENTIAL: ICD-10-CM

## 2020-04-01 RX ORDER — LOSARTAN POTASSIUM 50 MG/1
TABLET ORAL
Qty: 30 TABLET | Refills: 2 | Status: SHIPPED | OUTPATIENT
Start: 2020-04-01 | End: 2020-04-20 | Stop reason: SDUPTHER

## 2020-04-01 RX ORDER — PROPRANOLOL HYDROCHLORIDE 80 MG/1
TABLET ORAL
Qty: 30 TABLET | Refills: 2 | Status: SHIPPED | OUTPATIENT
Start: 2020-04-01 | End: 2020-06-26

## 2020-04-04 DIAGNOSIS — I10 ESSENTIAL HYPERTENSION: ICD-10-CM

## 2020-04-04 RX ORDER — LOSARTAN POTASSIUM 50 MG/1
50 TABLET ORAL DAILY
Qty: 30 TABLET | Refills: 0 | Status: CANCELLED | OUTPATIENT
Start: 2020-04-04

## 2020-04-20 DIAGNOSIS — I10 ESSENTIAL HYPERTENSION: ICD-10-CM

## 2020-04-20 RX ORDER — LOSARTAN POTASSIUM 50 MG/1
50 TABLET ORAL DAILY
Qty: 30 TABLET | Refills: 0 | Status: SHIPPED | OUTPATIENT
Start: 2020-04-20 | End: 2020-05-27 | Stop reason: DRUGHIGH

## 2020-05-27 DIAGNOSIS — I10 ESSENTIAL HYPERTENSION: Primary | ICD-10-CM

## 2020-05-27 RX ORDER — LOSARTAN POTASSIUM 100 MG/1
50 TABLET ORAL DAILY
Qty: 30 TABLET | Refills: 1 | Status: SHIPPED | OUTPATIENT
Start: 2020-05-27 | End: 2020-09-28

## 2020-06-26 DIAGNOSIS — G25.0 TREMOR, ESSENTIAL: ICD-10-CM

## 2020-06-26 RX ORDER — PROPRANOLOL HYDROCHLORIDE 80 MG/1
TABLET ORAL
Qty: 30 TABLET | Refills: 0 | Status: SHIPPED | OUTPATIENT
Start: 2020-06-26 | End: 2020-07-19

## 2020-07-07 ENCOUNTER — OFFICE VISIT (OUTPATIENT)
Dept: FAMILY MEDICINE CLINIC | Facility: CLINIC | Age: 80
End: 2020-07-07
Payer: COMMERCIAL

## 2020-07-07 VITALS
BODY MASS INDEX: 25.01 KG/M2 | HEART RATE: 60 BPM | TEMPERATURE: 97.3 F | OXYGEN SATURATION: 97 % | SYSTOLIC BLOOD PRESSURE: 120 MMHG | DIASTOLIC BLOOD PRESSURE: 60 MMHG | HEIGHT: 68 IN | WEIGHT: 165 LBS

## 2020-07-07 DIAGNOSIS — J44.9 CHRONIC OBSTRUCTIVE PULMONARY DISEASE, UNSPECIFIED COPD TYPE (HCC): ICD-10-CM

## 2020-07-07 DIAGNOSIS — I10 ESSENTIAL HYPERTENSION: ICD-10-CM

## 2020-07-07 DIAGNOSIS — K21.9 GASTROESOPHAGEAL REFLUX DISEASE WITHOUT ESOPHAGITIS: ICD-10-CM

## 2020-07-07 DIAGNOSIS — D69.6 THROMBOCYTOPENIA (HCC): ICD-10-CM

## 2020-07-07 DIAGNOSIS — Z28.21 IMMUNIZATION REFUSED: ICD-10-CM

## 2020-07-07 DIAGNOSIS — R73.01 IMPAIRED FASTING GLUCOSE: ICD-10-CM

## 2020-07-07 DIAGNOSIS — Z00.00 MEDICARE ANNUAL WELLNESS VISIT, SUBSEQUENT: Primary | ICD-10-CM

## 2020-07-07 DIAGNOSIS — F33.0 MILD EPISODE OF RECURRENT MAJOR DEPRESSIVE DISORDER (HCC): ICD-10-CM

## 2020-07-07 DIAGNOSIS — R91.1 PULMONARY NODULE: ICD-10-CM

## 2020-07-07 PROBLEM — Z86.59 HISTORY OF DEPRESSION: Status: ACTIVE | Noted: 2020-07-07

## 2020-07-07 PROCEDURE — 1160F RVW MEDS BY RX/DR IN RCRD: CPT | Performed by: FAMILY MEDICINE

## 2020-07-07 PROCEDURE — 4040F PNEUMOC VAC/ADMIN/RCVD: CPT | Performed by: FAMILY MEDICINE

## 2020-07-07 PROCEDURE — 3008F BODY MASS INDEX DOCD: CPT | Performed by: FAMILY MEDICINE

## 2020-07-07 PROCEDURE — 1101F PT FALLS ASSESS-DOCD LE1/YR: CPT | Performed by: FAMILY MEDICINE

## 2020-07-07 PROCEDURE — 3288F FALL RISK ASSESSMENT DOCD: CPT | Performed by: FAMILY MEDICINE

## 2020-07-07 PROCEDURE — 99214 OFFICE O/P EST MOD 30 MIN: CPT | Performed by: FAMILY MEDICINE

## 2020-07-07 PROCEDURE — 3078F DIAST BP <80 MM HG: CPT | Performed by: FAMILY MEDICINE

## 2020-07-07 PROCEDURE — 1125F AMNT PAIN NOTED PAIN PRSNT: CPT | Performed by: FAMILY MEDICINE

## 2020-07-07 PROCEDURE — G0439 PPPS, SUBSEQ VISIT: HCPCS | Performed by: FAMILY MEDICINE

## 2020-07-07 PROCEDURE — 4004F PT TOBACCO SCREEN RCVD TLK: CPT | Performed by: FAMILY MEDICINE

## 2020-07-07 PROCEDURE — 3074F SYST BP LT 130 MM HG: CPT | Performed by: FAMILY MEDICINE

## 2020-07-07 PROCEDURE — 1170F FXNL STATUS ASSESSED: CPT | Performed by: FAMILY MEDICINE

## 2020-07-07 RX ORDER — SERTRALINE HYDROCHLORIDE 100 MG/1
100 TABLET, FILM COATED ORAL DAILY
Qty: 30 TABLET | Refills: 2 | Status: SHIPPED | OUTPATIENT
Start: 2020-07-07 | End: 2020-10-22

## 2020-07-07 RX ORDER — BETAMETHASONE DIPROPIONATE 0.5 MG/G
OINTMENT TOPICAL 2 TIMES DAILY
COMMUNITY
Start: 2020-06-11 | End: 2022-03-25 | Stop reason: ALTCHOICE

## 2020-07-07 RX ORDER — SERTRALINE HYDROCHLORIDE 100 MG/1
TABLET, FILM COATED ORAL
COMMUNITY
Start: 2020-04-30 | End: 2020-07-07 | Stop reason: SDUPTHER

## 2020-07-07 NOTE — ASSESSMENT & PLAN NOTE
Chronic asymptomatic fair control continue current management including losartan 100 mg and propranolol 80 mg low-salt diet and increase physical activity discussed with the patient

## 2020-07-07 NOTE — ASSESSMENT & PLAN NOTE
Chronic asymptomatic fair control continue with the omeprazole 20 mg once a day a avoid provoke food do not eat and lie down

## 2020-07-07 NOTE — ASSESSMENT & PLAN NOTE
Chronic asymptomatic patient has not been taking his inhaler regularly as recommended any has not been follow-up with the Pulmonary he did not see them for more than 1 year and patient continued to smoking a discussed the patient smoking cessation also recommend to use inhaler as recommended to prevent admission to the hospital COPD exacerbation also recommend per Pneumovax 23

## 2020-07-07 NOTE — PROGRESS NOTES
Assessment and Plan:     Problem List Items Addressed This Visit        Digestive    Gastroesophageal reflux disease     Chronic asymptomatic fair control continue with the omeprazole 20 mg once a day a avoid provoke food do not eat and lie down            Endocrine    Impaired fasting glucose    Relevant Orders    Comprehensive metabolic panel    Lipid Panel with Direct LDL reflex       Respiratory    COPD (chronic obstructive pulmonary disease) (HCC)     Chronic asymptomatic patient has not been taking his inhaler regularly as recommended any has not been follow-up with the Pulmonary he did not see them for more than 1 year and patient continued to smoking a discussed the patient smoking cessation also recommend to use inhaler as recommended to prevent admission to the hospital COPD exacerbation also recommend per Pneumovax 23            Cardiovascular and Mediastinum    Hypertension     Chronic asymptomatic fair control continue current management including losartan 100 mg and propranolol 80 mg low-salt diet and increase physical activity discussed with the patient         Relevant Orders    Comprehensive metabolic panel    Lipid Panel with Direct LDL reflex       Other    Pulmonary nodule     A recent CT scan of the chest on June 20 it show increase the on the nodule in the right lower lobe to 7 mm the patient continued to smoke discussed smoking cessation with the patient and the recommend the a to be follow-up by Pulmonary         BMI 25 0-25 9,adult     Chronic asymptomatic stable encouraged patient to watch for the portion low carb low-fat diet increase physical activity         Thrombocytopenia (HCC)    Mild episode of recurrent major depressive disorder ([address] )     A new diagnosis the recently in April 2020 was started on the Zoloft 50 and increased to 100 mg tolerated well continue current management will refer the patient to behavior therapy         Relevant Medications    sertraline (ZOLOFT) 100 mg tablet    Other Relevant Orders    Ambulatory referral to [address] Medicare annual wellness visit, subsequent - Primary     Advice and education were given regarding nutrition, aerobic exercises, weight bearing exercises, cardiovascular risk reduction, fall risk reduction, and age appropriate supplements  The patient was counseled regarding instructions for management, risk factor reductions, prognosis, risks and benefits of treatment options, patient and family education, and importance of compliance with treatment  Patient declined recommended immunization including Pneumovax 23 in shingle vaccine           Other Visit Diagnoses     Immunization refused            BMI Counseling: Body mass index is 25 09 kg/m²  The BMI is above normal  Nutrition recommendations include decreasing portion sizes, consuming healthier snacks and limiting drinks that contain sugar  Exercise recommendations include exercising 3-5 times per week  Preventive health issues were discussed with patient, and age appropriate screening tests were ordered as noted in patient's After Visit Summary  Personalized health advice and appropriate referrals for health education or preventive services given if needed, as noted in patient's After Visit Summary       History of Present Illness:     Patient presents for Medicare Annual Wellness visit    Patient Care Team:  Radha Solis MD as PCP - General (Family Medicine)  Tasneem Flores MD (Surgical Oncology)  Yeyn Jaramillo MD (Dermatology)     Problem List:     Patient Active Problem List   Diagnosis    Tremor, essential    Other specified polyneuropathies    Malignant neoplasm of prostate [facility]    Chronic pansinusitis    Alcoholic cirrhosis of liver ([facility]    COPD (chronic obstructive pulmonary disease) ([address] )    Gout    Hepatocellular carcinoma ([facility]    Hypertension    LIANA (obstructive sleep apnea)    Pulmonary nodule    Contracture of palmar fascia    Constipation    Diverticulitis    Gastroesophageal reflux disease    ETOH abuse    Impaired fasting glucose    Lyme disease    Tremor    Tobacco dependence syndrome    History of prostate cancer    Thyroid nodule    Synovial cyst of right popliteal space    Hyperplasia of prostate    Ventral hernia without obstruction or gangrene    Diverticulosis    BMI 25 0-25 9,adult    Encounter for well adult exam with abnormal findings    Leucopenia    Spondylosis of lumbar region without myelopathy or radiculopathy    Thrombocytopenia (HCC)    Mild episode of recurrent major depressive disorder ([facility] )    Medicare annual wellness visit, subsequent      Past Medical and Surgical History:     Past Medical History:   Diagnosis Date    Anemia     dur to blood loss in the past    BPH (benign prostatic hyperplasia) 2002    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[facility] )     Cancer ([facility] )     hepatocellular, prostate    Chronic bronchitis ([facility] )     Chronic bronchitis ([facility] ) 7/19/2016    Last Assessment & Plan:  He has symptoms of chronic bronchitis  He would of course benefit from smoking cessation  I have recommended a trial of Spiriva      Colitis     COPD (chronic obstructive pulmonary disease) (HCC)     chronic bronchitis    Decubitus ulcer of sacral region, stage 3 (HCC) 3/12/2018    Diverticulitis     Essential tremor     ETOH abuse     GERD (gastroesophageal reflux disease)     Gout     Hearing loss     Hemorrhoids     History of Lyme disease     History of thrombocytopenia     chronic mild    History of transfusion     HTN (hypertension)     Hydrocele     Hyperlipidemia     Hypertension     Hyperthyroidism     Irritable bowel syndrome     Liver disease     cirrhosis    Lyme disease     Meningioma (HCC)     Meningioma (HCC)     left frontal- followed by Neurosurgery    Nicotine abuse     2-3 packs    Obstructive sleep apnea     unable to tolerate CPAP    Pressure injury of skin     Stage 3 that healed and has reoccurred    Pulmonary nodule     Sensory polyneuropathy     Sleep apnea     Spondylosis of lumbar region without myelopathy or radiculopathy 10/25/2019    Thyroid nodule     Tobacco abuse     Tremor      Past Surgical History:   Procedure Laterality Date    APPENDECTOMY      CATARACT EXTRACTION      debra    ESOPHAGOGASTRODUODENOSCOPY      HAND SURGERY  04/30/2018    LIVER SURGERY  03/09/2018    [place] REMV PILONIDAL LESION SIMPLE N/A 9/28/2018    Procedure: PILONIDAL CYSTECTOMY;  Surgeon: Marcie Tee DO;  Location: [address] [place] [address];  Service: [facility] THYROID SURGERY      TONSILLECTOMY        Family History:     Family History   Problem Relation Age of Onset    Diabetes Father     Other Father         colon problems/colostomy    Alcohol abuse Sister       Social History:        Social History     Socioeconomic History    Marital status:       Spouse name: None    Number of children: None    Years of education: None    Highest education level: None   Occupational History    Occupation:    Social Needs    Financial resource strain: Not hard at all   [place] insecurity:     Worry: Never true     Inability: Never true    Transportation needs:     Medical: No     Non-medical: No   Tobacco Use    Smoking status: Current Every Day Smoker     Packs/day: 1 50     Types: Cigarettes    Smokeless tobacco: Current User    Tobacco comment: hx of nicotine abuse 2-3 packs   Substance and Sexual Activity    Alcohol use: Yes     Frequency: Monthly or less     Drinks per session: 1 or 2     Binge frequency: Never    Drug use: No    Sexual activity: Not Currently     Partners: Female   Lifestyle    Physical activity:     Days per week: 0 days     Minutes per session: 0 min    Stress: Not at all   Relationships    Social connections:     Talks on phone: More than three times a week     Gets together: More than three times a week     Attends Restorationist service: Never     Active member of club or organization: No     Attends meetings of clubs or organizations: Never     Relationship status:     Intimate partner violence:     Fear of current or ex partner: No     Emotionally abused: No     Physically abused: No     Forced sexual activity: No   Other Topics Concern    None   Social History Narrative    Hx of caffeine abuse    Hx of ETOH abuse      Medications and Allergies:     Current Outpatient Medications   Medication Sig Dispense Refill    albuterol (VENTOLIN HFA) 90 mcg/act inhaler Inhale 2 puffs every 6 (six) hours as needed for wheezing 1 Inhaler 1    betamethasone, augmented, (DIPROLENE) 0 05 % ointment 2 (two) times a day Apply sparingly to affected area      budesonide-formoterol (Symbicort) 160-4 5 mcg/act inhaler Symbicort 160 mcg-4 5 mcg/actuation HFA aerosol inhaler   INHALE 2 PUFF BY INHALATION ROUTE 2 TIMES EVERY DAY IN THE MORNING AND EVENING      Cholecalciferol 1000 units CHEW Chew 5,000 Units daily        losartan (COZAAR) 100 MG tablet Take 0 5 tablets (50 mg total) by mouth daily 30 tablet 1    Omega-3 Fatty Acids (OMEGA-3 FISH OIL PO) Take 4,350 mg by mouth daily        Omeprazole 20 MG TBEC Take 1 tablet by mouth daily      propranolol (INDERAL) 80 mg tablet TAKE 1 TABLET BY MOUTH EVERY DAY 30 tablet 0    sertraline (ZOLOFT) 100 mg tablet Take 1 tablet (100 mg total) by mouth daily 30 tablet 2     No current facility-administered medications for this visit  Allergies   Allergen Reactions    Pneumococcal Vaccine       Immunizations:     Immunization History   Administered Date(s) Administered    INFLUENZA 12/20/2016, 10/01/2018    Influenza TIV (IM) 10/01/2012    Influenza, high dose seasonal 0 5 mL 09/25/2019    Pneumococcal Conjugate 13-Valent 05/24/2019    Td (adult), adsorbed 11/24/2010    Tdap 07/01/2005, 07/22/2014    Zoster 06/18/2008      Health Maintenance: There are no preventive care reminders to display for this patient        Topic Date Due    Hepatitis A Vaccine (1 of 2 - Risk 2-dose series) 12/13/1941    Hepatitis B Vaccine (1 of 3 - Risk 3-dose series) 12/13/1960    Pneumococcal Vaccine: 65+ Years (2 of 2 - PPSV23) 05/24/2020    Influenza Vaccine  07/01/2020      Medicare Health Risk Assessment:     /60   Pulse 60   Temp (!) 97 3 °F (36 3 °C) (Tympanic)   Ht 5' 8" (1 727 m)   Wt 74 8 kg (165 lb)   SpO2 97%   BMI 25 09 kg/m²      Randy Gallagher is here for his Subsequent Wellness visit  Last Medicare Wellness visit information reviewed, patient interviewed and updates made to the record today  Health Risk Assessment:   Patient rates overall health as very good  Patient feels that their physical health rating is same  Eyesight was rated as same  Hearing was rated as same  Patient feels that their emotional and mental health rating is same  Pain experienced in the last 7 days has been none  Patient states that he has experienced no weight loss or gain in last 6 months  Fall Risk Screening: In the past year, patient has experienced: no history of falling in past year      Home Safety:  Patient does not have trouble with stairs inside or outside of their home  Patient has working smoke alarms and has working carbon monoxide detector  Home safety hazards include: none  Nutrition:   Current diet is Regular  Medications:   Patient is currently taking over-the-counter supplements  OTC medications include: see medication list  Patient is able to manage medications  Activities of Daily Living (ADLs)/Instrumental Activities of Daily Living (IADLs):   Walk and transfer into and out of bed and chair?: Yes  Dress and groom yourself?: Yes    Bathe or shower yourself?: Yes    Feed yourself?  Yes  Do your laundry/housekeeping?: Yes  Manage your money, pay your bills and track your expenses?: Yes  Make your own meals?: Yes    Do your own shopping?: Yes    Durable Medical Equipment Suppliers  none    Previous Hospitalizations: Any hospitalizations or ED visits within the last 12 months?: No      Advance Care Planning:   Living will: Yes    Durable POA for healthcare:  Yes    Advanced directive: Yes    Advanced directive counseling given: Yes    Five wishes given: Yes    Patient declined ACP directive: No    End of Life Decisions reviewed with patient: Yes    Provider agrees with end of life decisions: Yes      Cognitive Screening:   Provider or family/friend/caregiver concerned regarding cognition?: No    PREVENTIVE SCREENINGS      Cardiovascular Screening:    General: Screening Current      Diabetes Screening:     General: Screening Current      Colorectal Cancer Screening:     General: Screening Not Indicated      Prostate Cancer Screening:    General: History Prostate Cancer and Screening Not Indicated      Osteoporosis Screening:    General: Patient Declines      Abdominal Aortic Aneurysm (AAA) Screening:    Risk factors include: tobacco use        General: Patient Declines      Lung Cancer Screening:     General: Screening Not Indicated      Hepatitis C Screening:    General: Screening Current      Preventive Screening Comments: Patient had Hep C screen on 2017 was negative      Madison Delong MD

## 2020-07-07 NOTE — ASSESSMENT & PLAN NOTE
Chronic asymptomatic stable encouraged patient to watch for the portion low carb low-fat diet increase physical activity

## 2020-07-07 NOTE — PROGRESS NOTES
BMI Counseling: Body mass index is 25 09 kg/m²  The BMI is above normal  Nutrition recommendations include decreasing portion sizes, consuming healthier snacks and limiting drinks that contain sugar  Exercise recommendations include exercising 3-5 times per week  Subjective:   Chief Complaint   Patient presents with    Medicare Wellness Visit        Patient ID: Valentino Slates is a 67 y o  male      Patient here for Medicare physical exam follow-up with a chronic condition patient was history of COPD he deny any cough or wheezing no recent exacerbation or hospitalization patient has not been taking the commended inhaler he has not been using the Symbicort and he has not been using the albuterol inhaler and he did not see his lung doctor of more than 1 year recently had a CT scan of the chest the order by the GI and it show he had increase and the size of the right lower lobe nodule to 7 mm  Patient history of hypertension blood pressure fair control on current medication tolerated well without any side effect deny any chest pain short of breath no palpitation no dyspnea on exertion and no lower extremity edema patient was history of GERD on omeprazole tolerated well deny any heartburn nausea no vomiting no abdomen distension and no weight loss patient was history of the impaired fasting glucose try to controlled with the low carb diet deny any increased thirsty increased frequency urination no dizziness no headache and no abdomen pain  On April 2020 patient had the visiting nurse from his insurance and he was screening for depression I was diagnosed with major depression disorder start on Zoloft 50 mg and after that increased to 100 mg and patient feel the medication slightly working but not quite there is still feeling depressed mood but no suicide attempt or ideation  Recent blood work discussed with the patient      The following portions of the patient's history were reviewed and updated as appropriate: allergies, current medications, past family history, past medical history, past social history, past surgical history and problem list     Review of Systems   Constitutional: Negative for activity change, appetite change, fatigue and fever  HENT: Negative for congestion, ear pain, sinus pressure, sinus pain and sore throat  Eyes: Negative for pain, discharge, redness and itching  Respiratory: Negative for cough, chest tightness, shortness of breath and stridor  Cardiovascular: Negative for chest pain, palpitations and leg swelling  Gastrointestinal: Negative for abdominal pain, blood in stool, constipation, diarrhea and nausea  Genitourinary: Negative for dysuria, flank pain, frequency and hematuria  Musculoskeletal: Negative for back pain, joint swelling and neck pain  Skin: Negative for pallor and rash  Neurological: Negative for dizziness, tremors, weakness, numbness and headaches  Hematological: Does not bruise/bleed easily  Objective:  Vitals:    07/07/20 1040   BP: 120/60   Pulse: 60   Temp: (!) 97 3 °F (36 3 °C)   TempSrc: Tympanic   SpO2: 97%   Weight: 74 8 kg (165 lb)   Height: 5' 8" (1 727 m)      Physical Exam   Constitutional: He is oriented to person, place, and time  He appears well-developed and well-nourished  No distress  HENT:   Head: Normocephalic  Right Ear: External ear normal    Left Ear: External ear normal    Eyes: Conjunctivae and EOM are normal  Right eye exhibits no discharge  Left eye exhibits no discharge  Neck: Normal range of motion  Neck supple  No JVD present  Cardiovascular: Normal rate, regular rhythm and normal heart sounds  Exam reveals no gallop  No murmur heard  Pulmonary/Chest: Effort normal and breath sounds normal  No stridor  No respiratory distress  He has no wheezes  He has no rales  He exhibits no tenderness  Abdominal: Soft  He exhibits no distension and no mass  There is no tenderness  There is no rebound  Musculoskeletal: He exhibits no edema or tenderness  Lymphadenopathy:     He has no cervical adenopathy  Neurological: He is alert and oriented to person, place, and time  Skin: Skin is warm  No rash noted  He is not diaphoretic  No erythema  Assessment/Plan:    Medicare annual wellness visit, subsequent  Advice and education were given regarding nutrition, aerobic exercises, weight bearing exercises, cardiovascular risk reduction, fall risk reduction, and age appropriate supplements  The patient was counseled regarding instructions for management, risk factor reductions, prognosis, risks and benefits of treatment options, patient and family education, and importance of compliance with treatment       Patient declined recommended immunization including Pneumovax 23 in shingle vaccine    BMI 25 0-25 9,adult  Chronic asymptomatic stable encouraged patient to watch for the portion low carb low-fat diet increase physical activity    Mild episode of recurrent major depressive disorder (HCC)  A new diagnosis the recently in April 2020 was started on the Zoloft 50 and increased to 100 mg tolerated well continue current management will refer the patient to behavior therapy    Hypertension  Chronic asymptomatic fair control continue current management including losartan 100 mg and propranolol 80 mg low-salt diet and increase physical activity discussed with the patient    Gastroesophageal reflux disease  Chronic asymptomatic fair control continue with the omeprazole 20 mg once a day a avoid provoke food do not eat and lie down    COPD (chronic obstructive pulmonary disease) (HCC)  Chronic asymptomatic patient has not been taking his inhaler regularly as recommended any has not been follow-up with the Pulmonary he did not see them for more than 1 year and patient continued to smoking a discussed the patient smoking cessation also recommend to use inhaler as recommended to prevent admission to the hospital COPD exacerbation also recommend per Pneumovax 23    Pulmonary nodule  A recent CT scan of the chest on June 20 it show increase the on the nodule in the right lower lobe to 7 mm the patient continued to smoke discussed smoking cessation with the patient and the recommend the a to be follow-up by Pulmonary       Diagnoses and all orders for this visit:    Medicare annual wellness visit, subsequent    BMI 25 0-25 9,adult    Mild episode of recurrent major depressive disorder (HCC)  -     sertraline (ZOLOFT) 100 mg tablet; Take 1 tablet (100 mg total) by mouth daily  -     Ambulatory referral to [facility]; Future    Immunization refused    Thrombocytopenia ([facility] )    Impaired fasting glucose  -     Comprehensive metabolic panel; Future  -     Lipid Panel with Direct LDL reflex; Future    Chronic obstructive pulmonary disease, unspecified COPD type ([facility] )    Essential hypertension  -     Comprehensive metabolic panel; Future  -     Lipid Panel with Direct LDL reflex;  Future    Gastroesophageal reflux disease without esophagitis    Pulmonary nodule    Other orders  -     Discontinue: sertraline (ZOLOFT) 100 mg tablet; TAKE 1 TABLET BY MOUTH EVERY DAY AT NIGHT  -     betamethasone, augmented, (DIPROLENE) 0 05 % ointment; 2 (two) times a day Apply sparingly to affected area

## 2020-07-07 NOTE — ASSESSMENT & PLAN NOTE
A new diagnosis the recently in April 2020 was started on the Zoloft 50 and increased to 100 mg tolerated well continue current management will refer the patient to behavior therapy

## 2020-07-07 NOTE — ASSESSMENT & PLAN NOTE
A recent CT scan of the chest on June 20 it show increase the on the nodule in the right lower lobe to 7 mm the patient continued to smoke discussed smoking cessation with the patient and the recommend the a to be follow-up by Pulmonary

## 2020-07-07 NOTE — ASSESSMENT & PLAN NOTE
Advice and education were given regarding nutrition, aerobic exercises, weight bearing exercises, cardiovascular risk reduction, fall risk reduction, and age appropriate supplements  The patient was counseled regarding instructions for management, risk factor reductions, prognosis, risks and benefits of treatment options, patient and family education, and importance of compliance with treatment       Patient declined recommended immunization including Pneumovax 23 in shingle vaccine

## 2020-07-07 NOTE — PATIENT INSTRUCTIONS
Medicare Preventive Visit Patient Instructions  Thank you for completing your Welcome to Medicare Visit or Medicare Annual Wellness Visit today  Your next wellness visit will be due in one year (7/7/2021)  The screening/preventive services that you may require over the next 5-10 years are detailed below  Some tests may not apply to you based off risk factors and/or age  Screening tests ordered at today's visit but not completed yet may show as past due  Also, please note that scanned in results may not display below  Preventive Screenings:  Service Recommendations Previous Testing/Comments   Colorectal Cancer Screening  · Colonoscopy    · Fecal Occult Blood Test (FOBT)/Fecal Immunochemical Test (FIT)  · Fecal DNA/Cologuard Test  · Flexible Sigmoidoscopy Age: 54-65 years old   Colonoscopy: every 10 years (May be performed more frequently if at higher risk)  OR  FOBT/FIT: every 1 year  OR  Cologuard: every 3 years  OR  Sigmoidoscopy: every 5 years  Screening may be recommended earlier than age 48 if at higher risk for colorectal cancer  Also, an individualized decision between you and your healthcare provider will decide whether screening between the ages of 74-80 would be appropriate   Colonoscopy: Not on file  FOBT/FIT: Not on file  Cologuard: Not on file  Sigmoidoscopy: Not on file         Prostate Cancer Screening Individualized decision between patient and health care provider in men between ages of 53-78   Medicare will cover every 12 months beginning on the day after your 50th birthday PSA: No results in last 5 years     History Prostate Cancer  Screening Not Indicated     Hepatitis C Screening Once for adults born between [birth year] and 1965  More frequently in patients at high risk for Hepatitis C Hep C Antibody: Not on file       Diabetes Screening 1-2 times per year if you're at risk for diabetes or have pre-diabetes Fasting glucose: No results in last 5 years   A1C: 5 1 %    Screening Current   Cholesterol Screening Once every 5 years if you don't have a lipid disorder  May order more often based on risk factors  Lipid panel: 02/24/2020    Screening Current      Other Preventive Screenings Covered by Medicare:  1  Abdominal Aortic Aneurysm (AAA) Screening: covered once if your at risk  You're considered to be at risk if you have a family history of AAA or a male between the age of 73-68 who smoking at least 100 cigarettes in your lifetime  2  Lung Cancer Screening: covers low dose CT scan once per year if you meet all of the following conditions: (1) Age 50-69; (2) No signs or symptoms of lung cancer; (3) Current smoker or have quit smoking within the last 15 years; (4) You have a tobacco smoking history of at least 30 pack years (packs per day x number of years you smoked); (5) You get a written order from a healthcare provider  3  Glaucoma Screening: covered annually if you're considered high risk: (1) You have diabetes OR (2) Family history of glaucoma OR (3)  aged 48 and older OR (3)  American aged 72 and older  3  Osteoporosis Screening: covered every 2 years if you meet one of the following conditions: (1) Have a vertebral abnormality; (2) On glucocorticoid therapy for more than 3 months; (3) Have primary hyperparathyroidism; (4) On osteoporosis medications and need to assess response to drug therapy  5  HIV Screening: covered annually if you're between the age of 12-76  Also covered annually if you are younger than 13 and older than 72 with risk factors for HIV infection  For pregnant patients, it is covered up to 3 times per pregnancy      Immunizations:  Immunization Recommendations   Influenza Vaccine Annual influenza vaccination during flu season is recommended for all persons aged >= 6 months who do not have contraindications   Pneumococcal Vaccine (Prevnar and Pneumovax)  * Prevnar = PCV13  * Pneumovax = PPSV23 Adults 25-60 years old: 1-3 doses may be recommended based on certain risk factors  Adults 72 years old: Prevnar (PCV13) vaccine recommended followed by Pneumovax (PPSV23) vaccine  If already received PPSV23 since turning 65, then PCV13 recommended at least one year after PPSV23 dose  Hepatitis B Vaccine 3 dose series if at intermediate or high risk (ex: diabetes, end stage renal disease, liver disease)   Tetanus (Td) Vaccine - COST NOT COVERED BY MEDICARE PART B Following completion of primary series, a booster dose should be given every 10 years to maintain immunity against tetanus  Td may also be given as tetanus wound prophylaxis  Tdap Vaccine - COST NOT COVERED BY MEDICARE PART B Recommended at least once for all adults  For pregnant patients, recommended with each pregnancy  Shingles Vaccine (Shingrix) - COST NOT COVERED BY MEDICARE PART B  2 shot series recommended in those aged 48 and above     Health Maintenance Due:  There are no preventive care reminders to display for this patient  Immunizations Due:      Topic Date Due    Hepatitis A Vaccine (1 of 2 - Risk 2-dose series) 12/13/1941    Hepatitis B Vaccine (1 of 3 - Risk 3-dose series) 12/13/1960    Pneumococcal Vaccine: 65+ Years (2 of 2 - PPSV23) 05/24/2020    Influenza Vaccine  07/01/2020     Advance Directives   What are advance directives? Advance directives are legal documents that state your wishes and plans for medical care  These plans are made ahead of time in case you lose your ability to make decisions for yourself  Advance directives can apply to any medical decision, such as the treatments you want, and if you want to donate organs  What are the types of advance directives? There are many types of advance directives, and each state has rules about how to use them  You may choose a combination of any of the following:  · Living will: This is a written record of the treatment you want  You can also choose which treatments you do not want, which to limit, and which to stop at a certain time   This includes surgery, medicine, IV fluid, and tube feedings  · Durable power of  for healthcare [facility]): This is a written record that states who you want to make healthcare choices for you when you are unable to make them for yourself  This person, called a proxy, is usually a family member or a friend  You may choose more than 1 proxy  · Do not resuscitate (DNR) order:  A DNR order is used in case your heart stops beating or you stop breathing  It is a request not to have certain forms of treatment, such as CPR  A DNR order may be included in other types of advance directives  · Medical directive: This covers the care that you want if you are in a coma, near death, or unable to make decisions for yourself  You can list the treatments you want for each condition  Treatment may include pain medicine, surgery, blood transfusions, dialysis, IV or tube feedings, and a ventilator (breathing machine)  · Values history: This document has questions about your views, beliefs, and how you feel and think about life  This information can help others choose the care that you would choose  Why are advance directives important? An advance directive helps you control your care  Although spoken wishes may be used, it is better to have your wishes written down  Spoken wishes can be misunderstood, or not followed  Treatments may be given even if you do not want them  An advance directive may make it easier for your family to make difficult choices about your care  Cigarette Smoking and Your Health   Risks to your health if you smoke:  Nicotine and other chemicals found in tobacco damage every cell in your body  Even if you are a light smoker, you have an increased risk for cancer, heart disease, and lung disease  If you are pregnant or have diabetes, smoking increases your risk for complications     Benefits to your health if you stop smoking:   · You decrease respiratory symptoms such as coughing, wheezing, and shortness of breath  · You reduce your risk for cancers of the lung, mouth, throat, kidney, bladder, pancreas, stomach, and cervix  If you already have cancer, you increase the benefits of chemotherapy  You also reduce your risk for cancer returning or a second cancer from developing  · You reduce your risk for heart disease, blood clots, heart attack, and stroke  · You reduce your risk for lung infections, and diseases such as pneumonia, asthma, chronic bronchitis, and emphysema  · Your circulation improves  More oxygen can be delivered to your body  If you have diabetes, you lower your risk for complications, such as kidney, artery, and eye diseases  You also lower your risk for nerve damage  Nerve damage can lead to amputations, poor vision, and blindness  · You improve your body's ability to heal and to fight infections  For more information and support to stop smoking:   · [facility]  Phone: 5- [phone]  Web Address: [employer]  How to Quit Using Smokeless Tobacco   Why it is important to stop using smokeless tobacco:  Smokeless tobacco comes in many forms  Examples include chew, snuff, dip, dissolvable tobacco, and snus  All smokeless tobacco products contain nicotine and may contain as much nicotine as 3 cigarettes  You may be physically dependent on nicotine  You may also be emotionally addicted to it  The cravings can be strong, but it is important to quit using smokeless tobacco  You will improve your health and decrease your cancer, stroke, and heart attack risk  Mouth sores and tooth problems will also improve when you quit  You can benefit from quitting no matter how long you have used smokeless tobacco    Prepare to stop using smokeless tobacco:  Nicotine is a highly addictive drug  Withdrawal symptoms can happen when you stop and make it hard to quit  The following can help keep you on track:  · Set a quit date  · Tell friends, family, and coworkers that you plan to quit  · Remove all smokeless tobacco products from your home, car, and workplace  Manage weight gain after you quit:  Nicotine can affect your metabolism  You may gain a few pounds after you quit  The following can help you control your weight:  · Eat healthy foods  · Drink water before, during, and between meals  · Exercise as directed  Weight Management   Why it is important to manage your weight:  Being overweight increases your risk of health conditions such as heart disease, high blood pressure, type 2 diabetes, and certain types of cancer  It can also increase your risk for osteoarthritis, sleep apnea, and other respiratory problems  Aim for a slow, steady weight loss  Even a small amount of weight loss can lower your risk of health problems  How to lose weight safely:  A safe and healthy way to lose weight is to eat fewer calories and get regular exercise  You can lose up about 1 pound a week by decreasing the number of calories you eat by 500 calories each day  Healthy meal plan for weight management:  A healthy meal plan includes a variety of foods, contains fewer calories, and helps you stay healthy  A healthy meal plan includes the following:  · Eat whole-grain foods more often  A healthy meal plan should contain fiber  Fiber is the part of grains, fruits, and vegetables that is not broken down by your body  Whole-grain foods are healthy and provide extra fiber in your diet  Some examples of whole-grain foods are whole-wheat breads and pastas, oatmeal, brown rice, and bulgur  · Eat a variety of vegetables every day  Include dark, leafy greens such as spinach, kale, charmaine greens, and mustard greens  Eat yellow and orange vegetables such as carrots, sweet potatoes, and winter squash  · Eat a variety of fruits every day  Choose fresh or canned fruit (canned in its own juice or light syrup) instead of juice  Fruit juice has very little or no fiber  · Eat low-fat dairy foods    Drink fat-free (skim) milk or 1% milk  Eat fat-free yogurt and low-fat cottage cheese  Try low-fat cheeses such as mozzarella and other reduced-fat cheeses  · Choose meat and other protein foods that are low in fat  Choose beans or other legumes such as split peas or lentils  Choose fish, skinless poultry (chicken or turkey), or lean cuts of red meat (beef or pork)  Before you cook meat or poultry, cut off any visible fat  · Use less fat and oil  Try baking foods instead of frying them  Add less fat, such as margarine, sour cream, regular salad dressing and mayonnaise to foods  Eat fewer high-fat foods  Some examples of high-fat foods include french fries, doughnuts, ice cream, and cakes  · Eat fewer sweets  Limit foods and drinks that are high in sugar  This includes candy, cookies, regular soda, and sweetened drinks  Exercise:  Exercise at least 30 minutes per day on most days of the week  Some examples of exercise include walking, biking, dancing, and swimming  You can also fit in more physical activity by taking the stairs instead of the elevator or parking farther away from stores  Ask your healthcare provider about the best exercise plan for you  © Copyright [employer] 2018 Information is for End User's use only and may not be sold, redistributed or otherwise used for commercial purposes   All illustrations and images included in CareNotes® are the copyrighted property of [employer]  or [address] [place] [address]

## 2020-07-19 DIAGNOSIS — G25.0 TREMOR, ESSENTIAL: ICD-10-CM

## 2020-07-19 RX ORDER — PROPRANOLOL HYDROCHLORIDE 80 MG/1
TABLET ORAL
Qty: 30 TABLET | Refills: 0 | Status: SHIPPED | OUTPATIENT
Start: 2020-07-19 | End: 2020-08-11

## 2020-08-11 DIAGNOSIS — G25.0 TREMOR, ESSENTIAL: ICD-10-CM

## 2020-08-11 RX ORDER — PROPRANOLOL HYDROCHLORIDE 80 MG/1
TABLET ORAL
Qty: 30 TABLET | Refills: 2 | Status: SHIPPED | OUTPATIENT
Start: 2020-08-11 | End: 2021-01-07

## 2020-09-28 DIAGNOSIS — I10 ESSENTIAL HYPERTENSION: ICD-10-CM

## 2020-09-28 RX ORDER — LOSARTAN POTASSIUM 100 MG/1
TABLET ORAL
Qty: 30 TABLET | Refills: 1 | Status: SHIPPED | OUTPATIENT
Start: 2020-09-28 | End: 2021-04-05

## 2020-10-22 DIAGNOSIS — F33.0 MILD EPISODE OF RECURRENT MAJOR DEPRESSIVE DISORDER (HCC): ICD-10-CM

## 2020-10-22 RX ORDER — SERTRALINE HYDROCHLORIDE 100 MG/1
TABLET, FILM COATED ORAL
Qty: 30 TABLET | Refills: 2 | Status: SHIPPED | OUTPATIENT
Start: 2020-10-22 | End: 2020-12-15 | Stop reason: SDUPTHER

## 2020-12-01 ENCOUNTER — TELEPHONE (OUTPATIENT)
Dept: FAMILY MEDICINE CLINIC | Facility: CLINIC | Age: 80
End: 2020-12-01

## 2020-12-02 DIAGNOSIS — J44.9 CHRONIC OBSTRUCTIVE PULMONARY DISEASE, UNSPECIFIED COPD TYPE (HCC): ICD-10-CM

## 2020-12-02 RX ORDER — ALBUTEROL SULFATE 90 UG/1
2 AEROSOL, METERED RESPIRATORY (INHALATION) EVERY 6 HOURS PRN
Qty: 1 INHALER | Refills: 0 | Status: SHIPPED | OUTPATIENT
Start: 2020-12-02 | End: 2021-11-03 | Stop reason: SDUPTHER

## 2020-12-15 ENCOUNTER — OFFICE VISIT (OUTPATIENT)
Dept: FAMILY MEDICINE CLINIC | Facility: CLINIC | Age: 80
End: 2020-12-15
Payer: COMMERCIAL

## 2020-12-15 VITALS
BODY MASS INDEX: 24.71 KG/M2 | DIASTOLIC BLOOD PRESSURE: 80 MMHG | HEART RATE: 75 BPM | OXYGEN SATURATION: 97 % | SYSTOLIC BLOOD PRESSURE: 138 MMHG | HEIGHT: 68 IN | TEMPERATURE: 96.7 F | WEIGHT: 163 LBS | RESPIRATION RATE: 16 BRPM

## 2020-12-15 DIAGNOSIS — R73.01 IMPAIRED FASTING GLUCOSE: ICD-10-CM

## 2020-12-15 DIAGNOSIS — Z23 NEED FOR INFLUENZA VACCINATION: ICD-10-CM

## 2020-12-15 DIAGNOSIS — D69.6 THROMBOCYTOPENIA (HCC): ICD-10-CM

## 2020-12-15 DIAGNOSIS — F33.0 MILD EPISODE OF RECURRENT MAJOR DEPRESSIVE DISORDER (HCC): ICD-10-CM

## 2020-12-15 DIAGNOSIS — K21.9 GASTROESOPHAGEAL REFLUX DISEASE WITHOUT ESOPHAGITIS: Primary | ICD-10-CM

## 2020-12-15 DIAGNOSIS — I10 ESSENTIAL HYPERTENSION: ICD-10-CM

## 2020-12-15 PROCEDURE — 90662 IIV NO PRSV INCREASED AG IM: CPT

## 2020-12-15 PROCEDURE — 99214 OFFICE O/P EST MOD 30 MIN: CPT | Performed by: FAMILY MEDICINE

## 2020-12-15 PROCEDURE — G0008 ADMIN INFLUENZA VIRUS VAC: HCPCS

## 2020-12-15 RX ORDER — SERTRALINE HYDROCHLORIDE 100 MG/1
150 TABLET, FILM COATED ORAL DAILY
Qty: 45 TABLET | Refills: 2 | Status: SHIPPED | OUTPATIENT
Start: 2020-12-15 | End: 2021-03-04

## 2021-01-07 DIAGNOSIS — G25.0 TREMOR, ESSENTIAL: ICD-10-CM

## 2021-01-07 RX ORDER — PROPRANOLOL HYDROCHLORIDE 80 MG/1
TABLET ORAL
Qty: 30 TABLET | Refills: 2 | Status: SHIPPED | OUTPATIENT
Start: 2021-01-07 | End: 2021-11-03 | Stop reason: SDDI

## 2021-01-19 ENCOUNTER — OFFICE VISIT (OUTPATIENT)
Dept: FAMILY MEDICINE CLINIC | Facility: CLINIC | Age: 81
End: 2021-01-19
Payer: COMMERCIAL

## 2021-01-19 VITALS
SYSTOLIC BLOOD PRESSURE: 128 MMHG | BODY MASS INDEX: 24.86 KG/M2 | TEMPERATURE: 97.1 F | HEIGHT: 68 IN | HEART RATE: 65 BPM | WEIGHT: 164 LBS | DIASTOLIC BLOOD PRESSURE: 82 MMHG

## 2021-01-19 DIAGNOSIS — K70.30 ALCOHOLIC CIRRHOSIS OF LIVER WITHOUT ASCITES (HCC): ICD-10-CM

## 2021-01-19 DIAGNOSIS — C22.0 HEPATOCELLULAR CARCINOMA (HCC): ICD-10-CM

## 2021-01-19 DIAGNOSIS — F17.200 TOBACCO DEPENDENCE SYNDROME: ICD-10-CM

## 2021-01-19 DIAGNOSIS — F33.0 MILD EPISODE OF RECURRENT MAJOR DEPRESSIVE DISORDER (HCC): ICD-10-CM

## 2021-01-19 DIAGNOSIS — C34.11 MALIGNANT NEOPLASM OF UPPER LOBE OF RIGHT LUNG (HCC): Primary | ICD-10-CM

## 2021-01-19 DIAGNOSIS — D69.6 THROMBOCYTOPENIA (HCC): ICD-10-CM

## 2021-01-19 DIAGNOSIS — J44.9 CHRONIC OBSTRUCTIVE PULMONARY DISEASE, UNSPECIFIED COPD TYPE (HCC): ICD-10-CM

## 2021-01-19 PROCEDURE — 3074F SYST BP LT 130 MM HG: CPT | Performed by: FAMILY MEDICINE

## 2021-01-19 PROCEDURE — 4004F PT TOBACCO SCREEN RCVD TLK: CPT | Performed by: FAMILY MEDICINE

## 2021-01-19 PROCEDURE — 99214 OFFICE O/P EST MOD 30 MIN: CPT | Performed by: FAMILY MEDICINE

## 2021-01-19 PROCEDURE — 3079F DIAST BP 80-89 MM HG: CPT | Performed by: FAMILY MEDICINE

## 2021-01-19 PROCEDURE — 1160F RVW MEDS BY RX/DR IN RCRD: CPT | Performed by: FAMILY MEDICINE

## 2021-01-19 RX ORDER — VARENICLINE TARTRATE 25 MG
KIT ORAL
Qty: 53 TABLET | Refills: 0 | Status: SHIPPED | OUTPATIENT
Start: 2021-01-19 | End: 2021-07-19 | Stop reason: ALTCHOICE

## 2021-01-19 NOTE — PROGRESS NOTES
Tobacco Cessation Counseling: Tobacco cessation counseling was provided  The patient is sincerely urged to quit consumption of tobacco  He is ready to quit tobacco  Medication options discussed  Patient agreed to medication  Varenicline (chantix) was prescribed  Subjective:   Chief Complaint   Patient presents with    Follow-up     chronic conditions        Patient ID: Teodora Quintero is a 79 y o  male  Patient here follow-up with a chronic condition patient history of major depression disorder we adjust his Zoloft to 150 mg once a day he has been using it for months tolerated well without any side effect mood is stable patient was long history of smoking and he had abnormal CT scan of the chest recently the the right upper lobe lung nodule biopsy has been done in January 13 result the adeno carcinoma and patient already had appointment with the oncology patient was history of alcoholic cirrhosis and hepatocellular carcinoma status post the surgery he continued to follow-up with the GI periodically      The following portions of the patient's history were reviewed and updated as appropriate: allergies, current medications, past family history, past medical history, past social history, past surgical history and problem list     Review of Systems   Constitutional: Negative for activity change, appetite change, fatigue and fever  HENT: Negative for congestion, ear pain, sinus pressure, sinus pain and sore throat  Eyes: Negative for pain, discharge, redness and itching  Respiratory: Negative for cough, chest tightness, shortness of breath and stridor  Cardiovascular: Negative for chest pain, palpitations and leg swelling  Gastrointestinal: Negative for abdominal pain, blood in stool, constipation, diarrhea and nausea  Genitourinary: Negative for dysuria, flank pain, frequency and hematuria  Musculoskeletal: Negative for back pain, joint swelling and neck pain     Skin: Negative for pallor and rash  Neurological: Negative for dizziness, tremors, weakness, numbness and headaches  Hematological: Does not bruise/bleed easily  Objective:  Vitals:    01/19/21 0820   BP: 128/82   BP Location: Left arm   Patient Position: Sitting   Cuff Size: Large   Pulse: 65   Temp: (!) 97 1 °F (36 2 °C)   TempSrc: Tympanic   Weight: 74 4 kg (164 lb)   Height: 5' 8" (1 727 m)      Physical Exam  Vitals signs and nursing note reviewed  Constitutional:       General: He is not in acute distress  Appearance: Normal appearance  He is well-developed  He is not diaphoretic  HENT:      Head: Normocephalic  Right Ear: Tympanic membrane, ear canal and external ear normal       Left Ear: Tympanic membrane, ear canal and external ear normal       Nose: Nose normal  No congestion or rhinorrhea  Mouth/Throat:      Mouth: Mucous membranes are moist       Pharynx: Oropharynx is clear  No oropharyngeal exudate or posterior oropharyngeal erythema  Eyes:      General:         Right eye: No discharge  Left eye: No discharge  Conjunctiva/sclera: Conjunctivae normal    Neck:      Musculoskeletal: Normal range of motion and neck supple  Vascular: No JVD  Cardiovascular:      Rate and Rhythm: Normal rate and regular rhythm  Heart sounds: Normal heart sounds  No murmur  No gallop  Pulmonary:      Effort: Pulmonary effort is normal  No respiratory distress  Breath sounds: No stridor  No wheezing or rales  Chest:      Chest wall: No tenderness  Abdominal:      General: There is no distension  Palpations: Abdomen is soft  There is no mass  Tenderness: There is no abdominal tenderness  There is no rebound  Musculoskeletal: Normal range of motion  General: No tenderness  Lymphadenopathy:      Cervical: No cervical adenopathy  Skin:     General: Skin is warm  Findings: No erythema or rash     Neurological:      Mental Status: He is alert and oriented to person, place, and time  Sensory: No sensory deficit  Gait: Gait normal    Psychiatric:         Mood and Affect: Mood normal          Behavior: Behavior normal            Assessment/Plan:    Malignant neoplasm of upper lobe of right lung (HCC)  A patient with long history of smoking recently had abnormal CT scan of the lung biopsy done on the right upper lobe on January 13 positive for cancer patient will follow-up with the oncology    Tobacco dependence syndrome  Chronic uncontrolled with recent diagnosis of lung cancer discussed the patient smoking cessation and the we discussed the medication including Chantix and he agree proper use discussed with the patient    Mild episode of recurrent major depressive disorder ([facility] [address] )  Is a chronic stable currently patient on Zoloft 150 mg tolerated well without any side effect    COPD (chronic obstructive pulmonary disease) ([facility] [address] )  Chronic asymptomatic the patient has not been taking his Symbicort as recommended recommend to use it twice a day and albuterol inhaler on p r n  Basis    Alcoholic cirrhosis of liver (HCC)  A chronic stable he does follow up with GI periodically    Hepatocellular carcinoma ([facility] )  Status post surgery patient does follow up with GI periodically       Diagnoses and all orders for this visit:    Malignant neoplasm of upper lobe of right lung (HCC)    Tobacco dependence syndrome  -     varenicline (CHANTIX ANASTACIA) 0 5 MG X 11 & 1 MG X 42 tablet; Take one 0 5 mg tablet by mouth once daily for 3 days, then one 0 5 mg tablet by mouth twice daily for 4 days, then one 1 mg tablet by mouth twice daily      Chronic obstructive pulmonary disease, unspecified COPD type ([facility] [address] )    Hepatocellular carcinoma (HCC)    Alcoholic cirrhosis of liver without ascites (HCC)    Mild episode of recurrent major depressive disorder (HCC)    Thrombocytopenia ([address] )

## 2021-01-20 NOTE — ASSESSMENT & PLAN NOTE
A patient with long history of smoking recently had abnormal CT scan of the lung biopsy done on the right upper lobe on January 13 positive for cancer patient will follow-up with the oncology

## 2021-01-20 NOTE — ASSESSMENT & PLAN NOTE
Chronic asymptomatic the patient has not been taking his Symbicort as recommended recommend to use it twice a day and albuterol inhaler on p r n   Basis

## 2021-01-20 NOTE — ASSESSMENT & PLAN NOTE
Chronic uncontrolled with recent diagnosis of lung cancer discussed the patient smoking cessation and the we discussed the medication including Chantix and he agree proper use discussed with the patient

## 2021-02-12 DIAGNOSIS — Z23 ENCOUNTER FOR IMMUNIZATION: ICD-10-CM

## 2021-03-02 ENCOUNTER — TELEPHONE (OUTPATIENT)
Dept: FAMILY MEDICINE CLINIC | Facility: CLINIC | Age: 81
End: 2021-03-02

## 2021-03-04 DIAGNOSIS — F33.0 MILD EPISODE OF RECURRENT MAJOR DEPRESSIVE DISORDER (HCC): ICD-10-CM

## 2021-03-04 RX ORDER — SERTRALINE HYDROCHLORIDE 100 MG/1
TABLET, FILM COATED ORAL
Qty: 30 TABLET | Refills: 2 | Status: SHIPPED | OUTPATIENT
Start: 2021-03-04 | End: 2021-09-16

## 2021-03-18 ENCOUNTER — TELEPHONE (OUTPATIENT)
Dept: FAMILY MEDICINE CLINIC | Facility: CLINIC | Age: 81
End: 2021-03-18

## 2021-04-03 DIAGNOSIS — I10 ESSENTIAL HYPERTENSION: ICD-10-CM

## 2021-04-05 RX ORDER — LOSARTAN POTASSIUM 100 MG/1
TABLET ORAL
Qty: 30 TABLET | Refills: 0 | Status: SHIPPED | OUTPATIENT
Start: 2021-04-05 | End: 2021-05-29

## 2021-05-29 DIAGNOSIS — I10 ESSENTIAL HYPERTENSION: ICD-10-CM

## 2021-05-29 RX ORDER — LOSARTAN POTASSIUM 100 MG/1
TABLET ORAL
Qty: 30 TABLET | Refills: 0 | Status: SHIPPED | OUTPATIENT
Start: 2021-05-29 | End: 2021-10-13

## 2021-06-17 ENCOUNTER — NURSE TRIAGE (OUTPATIENT)
Dept: OTHER | Facility: OTHER | Age: 81
End: 2021-06-17

## 2021-06-17 NOTE — TELEPHONE ENCOUNTER
Pt made aware of his options offered by Wanetta Opitz and he chose to be scheduled for appointment tomorrow  Appointment has been scheduled

## 2021-06-17 NOTE — TELEPHONE ENCOUNTER
Reason for Disposition   Blood in urine  (Exception: could be normal menstrual bleeding)    Answer Assessment - Initial Assessment Questions  1  COLOR of URINE: "Describe the color of the urine "  (e g , tea-colored, pink, red, blood clots, bloody)      Blood in the urine , dark red  Then the urine clears up  He denies voiding pure blood or seeing any blood clots  2  ONSET: "When did the bleeding start?"       Two days  3  EPISODES: "How many times has there been blood in the urine?" or "How many times today?"        Every time  he urinates for the last two days he sees a small amount of blood in the urine   He denies blood clots or difficulty urinating  4  PAIN with URINATION: "Is there any pain with passing your urine?" If so, ask: "How bad is the pain?"  (Scale 1-10; or mild, moderate, severe)     - MILD - complains slightly about urination hurting     - MODERATE - interferes with normal activities       - SEVERE - excruciating, unwilling or unable to urinate because of the pain       No pain has been experienced  5  FEVER: "Do you have a fever?" If so, ask: "What is your temperature, how was it measured, and when did it start?"      No fever  6  ASSOCIATED SYMPTOMS: "Are you passing urine more frequently than usual?"    No change in voiding pattern  No urgency or frequency  7  OTHER SYMPTOMS: "Do you have any other symptoms?" (e g , back/flank pain, abdominal pain, vomiting)     Denies any pain  He feels like he empties himself well  He says he is drinking well      Protocols used: URINE - BLOOD IN-ADULT-

## 2021-06-18 ENCOUNTER — OFFICE VISIT (OUTPATIENT)
Dept: FAMILY MEDICINE CLINIC | Facility: CLINIC | Age: 81
End: 2021-06-18
Payer: COMMERCIAL

## 2021-06-18 VITALS
SYSTOLIC BLOOD PRESSURE: 124 MMHG | DIASTOLIC BLOOD PRESSURE: 68 MMHG | BODY MASS INDEX: 25.31 KG/M2 | RESPIRATION RATE: 16 BRPM | TEMPERATURE: 97.6 F | WEIGHT: 167 LBS | HEART RATE: 75 BPM | OXYGEN SATURATION: 93 % | HEIGHT: 68 IN

## 2021-06-18 DIAGNOSIS — Z91.14 NON COMPLIANCE W MEDICATION REGIMEN: ICD-10-CM

## 2021-06-18 DIAGNOSIS — R31.0 GROSS HEMATURIA: Primary | ICD-10-CM

## 2021-06-18 PROBLEM — Z91.148 NON COMPLIANCE W MEDICATION REGIMEN: Status: ACTIVE | Noted: 2021-06-18

## 2021-06-18 PROCEDURE — 1160F RVW MEDS BY RX/DR IN RCRD: CPT | Performed by: NURSE PRACTITIONER

## 2021-06-18 PROCEDURE — 99214 OFFICE O/P EST MOD 30 MIN: CPT | Performed by: NURSE PRACTITIONER

## 2021-06-18 RX ORDER — HYDROXYZINE HYDROCHLORIDE 25 MG/1
TABLET, FILM COATED ORAL
COMMUNITY
Start: 2021-04-06 | End: 2021-11-03 | Stop reason: SDDI

## 2021-06-18 NOTE — PROGRESS NOTES
Assessment/Plan:    Gross hematuria  Acute symptomatic patient with blood in urine x 3 days  Urine provided in office with more blood than urine, bright red color and large clots (approximate size of quarters) present  Will recommend report to ED  Discussed with patient and he wants to drive himself  Called report to [facility] (patient preference)  Non compliance w medication regimen  Patient reports that he is unable to afford any of his medication so he isn't taking any  Educated patient on importance of medication compliance  Will send referral for social work to assist with cost and other alternatives  Diagnoses and all orders for this visit:    Gross hematuria    Non compliance w medication regimen  -     Ambulatory referral to social work care management program; Future    Other orders  -     Discontinue: magnesium carbonate (Magonate) 54 (Mag Equiv) MG/5ML LIQD oral liquid; Take 400 mg by mouth  -     hydrOXYzine HCL (ATARAX) 25 mg tablet; TAKE 1 TABLET BY MOUTH EVERY 4 HOURS AS NEEDED FOR ANXIETY (Patient not taking: Reported on 6/18/2021)          Subjective:      Patient ID: Jeromy Ledesma is a 79 y o  male  Patient arrives with c/o blood in his urine x3 days  Patient reports that he had slight pink tinge to urine the last two days but today he began with bright red blood in urine  Urine provided in office was primarily blood with large clots present  Patient denies pain, sob, and chest pain  PMH of prostate cancer, hepatocellular carcinoma, and malignant neoplasm of lung  Patient currently on immunotherapy not chemo  The following portions of the patient's history were reviewed and updated as appropriate: allergies, current medications, past family history, past medical history, past social history, past surgical history and problem list     Review of Systems   Constitutional: Negative for appetite change, chills, fatigue and fever     HENT: Negative for congestion, rhinorrhea, sneezing and sore throat  Respiratory: Negative for cough, chest tightness, shortness of breath and wheezing  Cardiovascular: Negative for chest pain and palpitations  Gastrointestinal: Negative for blood in stool, constipation, diarrhea, nausea and vomiting  Genitourinary: Positive for hematuria (gross hematuria with large clots present  More blood than urine in sample)  Negative for dysuria, flank pain, frequency and urgency  Musculoskeletal: Negative for back pain  Skin: Negative for color change, pallor and rash  Neurological: Negative for dizziness and headaches  Hematological: Negative for adenopathy  Psychiatric/Behavioral: Negative for agitation and confusion  Objective:      /68 (BP Location: Left arm, Patient Position: Sitting, Cuff Size: Adult)   Pulse 75   Temp 97 6 °F (36 4 °C) (Tympanic)   Resp 16   Ht 5' 8" (1 727 m)   Wt 75 8 kg (167 lb)   SpO2 93%   BMI 25 39 kg/m²          Physical Exam  Vitals and nursing note reviewed  Constitutional:       General: He is not in acute distress  Appearance: Normal appearance  He is not ill-appearing, toxic-appearing or diaphoretic  HENT:      Head: Normocephalic and atraumatic  Right Ear: Tympanic membrane, ear canal and external ear normal  There is no impacted cerumen  Left Ear: Tympanic membrane, ear canal and external ear normal  There is no impacted cerumen  Nose: No rhinorrhea  Mouth/Throat:      Mouth: Mucous membranes are moist       Pharynx: Oropharynx is clear  No oropharyngeal exudate or posterior oropharyngeal erythema  Eyes:      General: No scleral icterus  Right eye: No discharge  Left eye: No discharge  Extraocular Movements: Extraocular movements intact  Conjunctiva/sclera: Conjunctivae normal       Pupils: Pupils are equal, round, and reactive to light  Cardiovascular:      Rate and Rhythm: Normal rate and regular rhythm  Pulses: Normal pulses  Heart sounds: Normal heart sounds  Pulmonary:      Effort: Pulmonary effort is normal  No respiratory distress  Breath sounds: Normal breath sounds  No wheezing  Abdominal:      General: There is no distension  Tenderness: There is no abdominal tenderness  There is no right CVA tenderness, left CVA tenderness or guarding  Musculoskeletal:         General: Normal range of motion  Lymphadenopathy:      Cervical: No cervical adenopathy  Skin:     General: Skin is warm and dry  Capillary Refill: Capillary refill takes less than 2 seconds  Coloration: Skin is not jaundiced or pale  Findings: No erythema or rash  Neurological:      General: No focal deficit present  Mental Status: He is alert and oriented to person, place, and time  Gait: Gait normal    Psychiatric:         Mood and Affect: Mood normal          Behavior: Behavior normal          Thought Content:  Thought content normal          Judgment: Judgment normal

## 2021-06-18 NOTE — ASSESSMENT & PLAN NOTE
Acute symptomatic patient with blood in urine x 3 days  Urine provided in office with more blood than urine, bright red color and large clots (approximate size of quarters) present  Will recommend report to ED  Discussed with patient and he wants to drive himself  Called report to [facility] (patient preference)

## 2021-06-18 NOTE — ASSESSMENT & PLAN NOTE
Patient reports that he is unable to afford any of his medication so he isn't taking any  Educated patient on importance of medication compliance  Will send referral for social work to assist with cost and other alternatives

## 2021-06-21 ENCOUNTER — PATIENT OUTREACH (OUTPATIENT)
Dept: FAMILY MEDICINE CLINIC | Facility: CLINIC | Age: 81
End: 2021-06-21

## 2021-06-25 ENCOUNTER — PATIENT OUTREACH (OUTPATIENT)
Dept: FAMILY MEDICINE CLINIC | Facility: CLINIC | Age: 81
End: 2021-06-25

## 2021-06-25 NOTE — PROGRESS NOTES
Chart reviewed  Patient is currently hospitalized  SW CM will follow up with patient and his medications post discharge to outpatient setting

## 2021-07-16 ENCOUNTER — TRANSITIONAL CARE MANAGEMENT (OUTPATIENT)
Dept: FAMILY MEDICINE CLINIC | Facility: CLINIC | Age: 81
End: 2021-07-16

## 2021-07-19 ENCOUNTER — OFFICE VISIT (OUTPATIENT)
Dept: FAMILY MEDICINE CLINIC | Facility: CLINIC | Age: 81
End: 2021-07-19
Payer: COMMERCIAL

## 2021-07-19 VITALS
TEMPERATURE: 98.3 F | BODY MASS INDEX: 23.49 KG/M2 | HEART RATE: 84 BPM | WEIGHT: 155 LBS | OXYGEN SATURATION: 95 % | DIASTOLIC BLOOD PRESSURE: 70 MMHG | SYSTOLIC BLOOD PRESSURE: 140 MMHG | HEIGHT: 68 IN

## 2021-07-19 DIAGNOSIS — F17.200 TOBACCO DEPENDENCE SYNDROME: ICD-10-CM

## 2021-07-19 DIAGNOSIS — R31.0 GROSS HEMATURIA: ICD-10-CM

## 2021-07-19 DIAGNOSIS — C67.9 UROTHELIAL CARCINOMA OF BLADDER (HCC): Primary | ICD-10-CM

## 2021-07-19 DIAGNOSIS — C79.9 METASTATIC MALIGNANT NEOPLASM, UNSPECIFIED SITE (HCC): ICD-10-CM

## 2021-07-19 DIAGNOSIS — E43 SEVERE PROTEIN-ENERGY MALNUTRITION (HCC): ICD-10-CM

## 2021-07-19 PROBLEM — C34.31 MALIGNANT NEOPLASM OF LOWER LOBE OF RIGHT LUNG (HCC): Status: ACTIVE | Noted: 2021-03-02

## 2021-07-19 PROBLEM — I10 BENIGN ESSENTIAL HTN: Status: ACTIVE | Noted: 2021-04-05

## 2021-07-19 PROBLEM — R31.9 HEMATURIA: Status: ACTIVE | Noted: 2021-06-21

## 2021-07-19 PROBLEM — F10.20 ALCOHOL DEPENDENCE, DAILY USE (HCC): Status: ACTIVE | Noted: 2021-07-19

## 2021-07-19 PROBLEM — L21.9 SEBORRHEIC DERMATITIS OF SCALP: Status: ACTIVE | Noted: 2021-07-09

## 2021-07-19 PROBLEM — C78.00 METASTASIS TO LUNG (HCC): Status: ACTIVE | Noted: 2021-01-25

## 2021-07-19 PROCEDURE — 99496 TRANSJ CARE MGMT HIGH F2F 7D: CPT | Performed by: FAMILY MEDICINE

## 2021-07-19 RX ORDER — NICOTINE 21 MG/24HR
1 PATCH, TRANSDERMAL 24 HOURS TRANSDERMAL EVERY 24 HOURS
Qty: 28 PATCH | Refills: 0 | Status: SHIPPED | OUTPATIENT
Start: 2021-07-19 | End: 2021-11-03 | Stop reason: SDDI

## 2021-07-19 NOTE — PROGRESS NOTES
Assessment/Plan:     Urothelial carcinoma of bladder (HCC)   New diagnosis status post hospitalization in June 21 patient presented with the gross hematuria and clot the the patient a poor candidate for surgical and  The he already had pelvic radiation previously recommendation for immunotherapy patient will be follow up with the Urology as out patient will refer him to Urology    Tobacco dependence syndrome   A chronic uncontrolled patient has been smoking for more than 40 years the patient had multiple cancer lung cancer prostate cancer and recently diagnosed with bladder cancer discussed with the patient important stop smoking and he willing to try the nicotine patch proper use discussed with the patient    Hematuria   A new diagnosis workup including cystoscopy and a it show patient has bladder cancer patient will follow up with the Urology    Se[facility] malnutrition ([facility] )  Malnutrition Findings:         Wast in temporal area and prominent cheeck boneBMI Findings:   a discussed the increase the calorie intake protein supplement between the meal        Body mass index is 23 57 kg/m²  Diagnoses and all orders for this visit:    Urothelial carcinoma of bladder ([facility] )  -     Cancel: Ambulatory referral to Urology; Future  -     Ambulatory referral to Urology; Future    Metastatic malignant neoplasm, unspecified site [facility])  -     Ambulatory referral to Urology; Future    Severe protein-energy malnutrition (HCC)  -     CBC and differential; Future  -     Basic metabolic panel; Future    Tobacco dependence syndrome  -     nicotine (NICODERM CQ) 21 mg/24 hr TD 24 hr patch; Place 1 patch on the skin every 24 hours    Gross hematuria         Subjective:     Patient ID: Melvin Mcmahon is a 79 y o  male  Falls Plan of Care: balance, strength, and gait training instructions were provided  Tobacco Cessation Counseling: Tobacco cessation counseling was provided   The patient is sincerely urged to quit consumption of tobacco  He is ready to quit tobacco  Medication options discussed  Nicotine patch was prescribed  Patient here status post the hospitalization patient was admitted on June 21 for a gross hematuria the patient known to have history of liver cancer prostatic cancer and lung cancer and he has smoker and patient been evaluated by Urology during hospitalization he had a Forbes catheter in place his hemoglobin was stable and he continued to have hematuria the a Urology recommend the to treated as out patient with hyperbaric oxygen but his condition get worse and having clot painful was taking to the OR on June 29 and the had the cystoscopy found to have a 2 cm papular lesion and the pathology came back with invasive high-grade the urothelial carcinoma of the bladder Forbes catheter has been removed on June 30 a per urology patient is not candidate for radiation therapy given his history of having pelvic radiation for prostate cancer and he has poor surgical candidate recommend the possible immunotherapy patient will be following up by Urology as out patient patient discharged from the hospital to the in-patient rehab the patient been discharged home on July 14, 2021   I did review with the patient hospital record and blood work and patient continued to have slight blood in the urine he does not have the Forbes catheter patient continued to smoke and he does not have appetite he lost the weight      Review of Systems   Constitutional: Negative for activity change, appetite change, fatigue and fever  HENT: Negative for congestion, ear pain, sinus pressure, sinus pain and sore throat  Eyes: Negative for pain, discharge, redness and itching  Respiratory: Negative for cough, chest tightness, shortness of breath and stridor  Cardiovascular: Negative for chest pain, palpitations and leg swelling  Gastrointestinal: Negative for abdominal pain, blood in stool, constipation, diarrhea and nausea  Genitourinary: Negative for dysuria, flank pain, frequency and hematuria  Musculoskeletal: Negative for back pain, joint swelling and neck pain  Skin: Negative for pallor and rash  Neurological: Negative for dizziness, tremors, weakness, numbness and headaches  Hematological: Does not bruise/bleed easily  Objective:     Physical Exam  Vitals and nursing note reviewed  Constitutional:       General: He is not in acute distress  Appearance: Normal appearance  He is well-developed  He is not diaphoretic  HENT:      Head: Normocephalic  Right Ear: Tympanic membrane, ear canal and external ear normal       Left Ear: Tympanic membrane, ear canal and external ear normal       Nose: Nose normal  No congestion or rhinorrhea  Mouth/Throat:      Mouth: Mucous membranes are moist       Pharynx: Oropharynx is clear  No oropharyngeal exudate or posterior oropharyngeal erythema  Eyes:      General:         Right eye: No discharge  Left eye: No discharge  Conjunctiva/sclera: Conjunctivae normal    Neck:      Vascular: No JVD  Cardiovascular:      Rate and Rhythm: Normal rate and regular rhythm  Heart sounds: Normal heart sounds  No murmur heard  No gallop  Pulmonary:      Effort: Pulmonary effort is normal  No respiratory distress  Breath sounds: Normal breath sounds  No stridor  No wheezing or rales  Chest:      Chest wall: No tenderness  Abdominal:      General: There is no distension  Palpations: Abdomen is soft  There is no mass  Tenderness: There is no abdominal tenderness  There is no rebound  Musculoskeletal:         General: No tenderness  Normal range of motion  Cervical back: Normal range of motion and neck supple  Lymphadenopathy:      Cervical: No cervical adenopathy  Skin:     General: Skin is warm  Findings: No erythema or rash  Neurological:      Mental Status: He is alert and oriented to person, place, and time  Sensory: No sensory deficit  Gait: Gait normal    Psychiatric:         Mood and Affect: Mood normal          Behavior: Behavior normal            Vitals:    07/19/21 1318   BP: 140/70   Pulse: 84   Temp: 98 3 °F (36 8 °C)   TempSrc: Tympanic   SpO2: 95%   Weight: 70 3 kg (155 lb)   Height: 5' 8" (1 727 m)       Transitional Care Management Review:  Magui Robles is a 79 y o  male here for TCM follow up  During the TCM phone call patient stated:    TCM Call (since 6/20/2021)     Date and time call was made  7/16/2021  9:08 AM    Hospital care reviewed  Records reviewed    Patient was hospitialized at  [facility]        Date of Admission  06/21/21    Date of discharge  07/15/21    Diagnosis  urinary retention     Disposition  Home    Were the patients medications reviewed and updated  No    Current Symptoms  None      TCM Call (since 6/20/2021)     Post hospital issues  None    Should patient be enrolled in anticoag monitoring?   No    I have advised the patient to call PCP with any new or worsening symptoms  radha Garcia 22  Family    The type of support provided  Emotional    Do you have social support  Yes, as much as I need    Are you recieving any outpatient services  No    Are you recieving home care services  No    Are you using any community resources  No    Current waiver services  No    Have you fallen in the last 12 months  No    Interperter language line needed  No          Darrion Concepcion MD

## 2021-07-21 ENCOUNTER — TELEPHONE (OUTPATIENT)
Dept: UROLOGY | Facility: MEDICAL CENTER | Age: 81
End: 2021-07-21

## 2021-07-21 ENCOUNTER — PATIENT OUTREACH (OUTPATIENT)
Dept: FAMILY MEDICINE CLINIC | Facility: CLINIC | Age: 81
End: 2021-07-21

## 2021-07-21 NOTE — ASSESSMENT & PLAN NOTE
A chronic uncontrolled patient has been smoking for more than 40 years the patient had multiple cancer lung cancer prostate cancer and recently diagnosed with bladder cancer discussed with the patient important stop smoking and he willing to try the nicotine patch proper use discussed with the patient

## 2021-07-21 NOTE — ASSESSMENT & PLAN NOTE
Malnutrition Findings:         Wast in temporal area and prominent cheeck boneBMI Findings:   a discussed the increase the calorie intake protein supplement between the meal        Body mass index is 23 57 kg/m²

## 2021-07-21 NOTE — TELEPHONE ENCOUNTER
Call placed to patient to find out if he has CD with imaging on it for his visit with YON 7/22/21  Office contact information left for patient to return our call

## 2021-07-21 NOTE — ASSESSMENT & PLAN NOTE
New diagnosis status post hospitalization in June 21 patient presented with the gross hematuria and clot the the patient a poor candidate for surgical and  The he already had pelvic radiation previously recommendation for immunotherapy patient will be follow up with the Urology as out patient will refer him to Urology

## 2021-07-21 NOTE — ASSESSMENT & PLAN NOTE
A new diagnosis workup including cystoscopy and a it show patient has bladder cancer patient will follow up with the Urology

## 2021-07-21 NOTE — PROGRESS NOTES
Contacted patient for f/u  Jaison Canada was in ED today with elevated pulse of 173 and BP of 88/60  He states he was feeling fine, no c/o chest pain, sob, dizziness  He is receiving home care services of   He had recent hospitalization 6/21-7/15 at [facility] where he had TURBT  He does not have a pringle, urinating without difficulty but does have incontinence issues  He denies hematuria and is forcing fluids  During our call he was having issues hearing me, arranged to contact him back tomorrow

## 2021-07-22 ENCOUNTER — PATIENT OUTREACH (OUTPATIENT)
Dept: FAMILY MEDICINE CLINIC | Facility: CLINIC | Age: 81
End: 2021-07-22

## 2021-07-22 ENCOUNTER — TELEPHONE (OUTPATIENT)
Dept: UROLOGY | Facility: MEDICAL CENTER | Age: 81
End: 2021-07-22

## 2021-07-22 NOTE — TELEPHONE ENCOUNTER
Patient did not present for their scheduled appointment despite reminder phone calls  Patient recently diagnosed at [facility] with muscle invasive bladder cancer in addition to his known lung and hepatocellular carcinoma currently on immunotherapy  Has upcoming urology visit with his primary Urology Service at the [facility] on July 28th  If the patient prefers, we can certainly reschedule his appointment for 2nd opinion at a later date and time

## 2021-07-28 ENCOUNTER — PATIENT OUTREACH (OUTPATIENT)
Dept: FAMILY MEDICINE CLINIC | Facility: CLINIC | Age: 81
End: 2021-07-28

## 2021-07-29 ENCOUNTER — PATIENT OUTREACH (OUTPATIENT)
Dept: FAMILY MEDICINE CLINIC | Facility: CLINIC | Age: 81
End: 2021-07-29

## 2021-07-29 NOTE — PROGRESS NOTES
Received return call from patient  Ed is managing well at home and denies needing additional assistance  Respiratory status is stable at present, he denies sob, wheezing or cough  He had recent ED visit for elevated pulse and low BP  He has BP cuff at home but does not monitor daily  Instructed him to monitor daily  Denies dizziness or lightheadedness  During our call he had phone issues and couldn't hear me  Contacted him back and he had the same issue, states his phone is not keeping charge  Will try to contact at another time

## 2021-08-04 ENCOUNTER — PATIENT OUTREACH (OUTPATIENT)
Dept: FAMILY MEDICINE CLINIC | Facility: CLINIC | Age: 81
End: 2021-08-04

## 2021-08-05 ENCOUNTER — PATIENT OUTREACH (OUTPATIENT)
Dept: FAMILY MEDICINE CLINIC | Facility: CLINIC | Age: 81
End: 2021-08-05

## 2021-08-12 ENCOUNTER — OFFICE VISIT (OUTPATIENT)
Dept: UROLOGY | Facility: CLINIC | Age: 81
End: 2021-08-12
Payer: COMMERCIAL

## 2021-08-12 ENCOUNTER — PATIENT OUTREACH (OUTPATIENT)
Dept: FAMILY MEDICINE CLINIC | Facility: CLINIC | Age: 81
End: 2021-08-12

## 2021-08-12 VITALS
HEART RATE: 71 BPM | HEIGHT: 68 IN | WEIGHT: 154 LBS | SYSTOLIC BLOOD PRESSURE: 126 MMHG | BODY MASS INDEX: 23.34 KG/M2 | DIASTOLIC BLOOD PRESSURE: 76 MMHG

## 2021-08-12 DIAGNOSIS — N32.89 BLADDER SPASM: Primary | ICD-10-CM

## 2021-08-12 DIAGNOSIS — C67.9 UROTHELIAL CARCINOMA OF BLADDER (HCC): ICD-10-CM

## 2021-08-12 DIAGNOSIS — C79.9 METASTATIC MALIGNANT NEOPLASM, UNSPECIFIED SITE (HCC): ICD-10-CM

## 2021-08-12 PROCEDURE — 4004F PT TOBACCO SCREEN RCVD TLK: CPT | Performed by: UROLOGY

## 2021-08-12 PROCEDURE — 99205 OFFICE O/P NEW HI 60 MIN: CPT | Performed by: UROLOGY

## 2021-08-12 PROCEDURE — 1160F RVW MEDS BY RX/DR IN RCRD: CPT | Performed by: UROLOGY

## 2021-08-12 NOTE — PROGRESS NOTES
UROLOGY TRANSFER OF CARE NOTE     CHIEF COMPLAINT   Gelacio Ryder is a 79 y o  male with a complaint of   Chief Complaint   Patient presents with    Bladder Cancer    Prostate Cancer     History of Present Illness:     79 y o  male with a history of alcoholism and diagnosis of hepatocellular carcinoma status post hepatectomy  He was then diagnosed with multifocal adenocarcinoma of the lung  Patient had lung biopsy and  And is following with Oncology at [facility], Dr Sheng Jones, who is administering [address] with palliative intent  During recent hospitalization, the patient was found have imaging that suggested bladder cancer  He underwent inpatient cystoscopy and bladder resection, with instillation of mitomycin-C on 6/29/2021 with Dr Garner Rising  By report, the pathology was high-grade invasive urothelial carcinoma  This was a 2 cm lesion on the right side  Patient reports that he has not seen the operative urologist back for pathology discussion and is unaware of the pathology  There does appear to have been recent visits with his oncology doctor who has discussed this with him  He was previously scheduled for second opinion evaluation in my office but did not present for this appointment and was rescheduled for today  Patient has concerns and complaints about ongoing bladder irritation and leakage  He is on a medication nightly but does not recall which medication this is  Patient is a chronic alcohol user and reports 2 drinks of vodka per day  He reports that these are essentially 2 finger height port into a glass  He lives alone and does not have support system  He has also recently noticed a draining pimple from his backside that has been draining some purulence  He wishes to discuss this as well      No results found for: PSA    Past Medical History:     Past Medical History:   Diagnosis Date    Anemia     dur to blood loss in the past    BMI 25 0-25 9,adult 5/24/2019    BPH (benign prostatic hyperplasia) 2002    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[facility] [address] )     Cancer ([facility] )     hepatocellular, prostate    Chronic bronchitis (HCC)     Chronic bronchitis ([facility] [address] ) 7/19/2016    Last Assessment & Plan:  He has symptoms of chronic bronchitis  He would of course benefit from smoking cessation  I have recommended a trial of Spiriva      Colitis     COPD (chronic obstructive pulmonary disease) (HCC)     chronic bronchitis    Decubitus ulcer of sacral region, stage 3 (HCC) 3/12/2018    Diverticulitis     Essential tremor     ETOH abuse     GERD (gastroesophageal reflux disease)     Gout     Hearing loss     Hemorrhoids     History of Lyme disease     History of thrombocytopenia     chronic mild    History of transfusion     HTN (hypertension)     Hydrocele     Hyperlipidemia     Hypertension     Hyperthyroidism     Irritable bowel syndrome     Liver disease     cirrhosis    Lyme disease     Meningioma (HCC)     Meningioma (HCC)     left frontal- followed by Neurosurgery    Nicotine abuse     2-3 packs    Obstructive sleep apnea     unable to tolerate CPAP    Pressure injury of skin     Stage 3 that healed and has reoccurred    Pulmonary nodule     Sensory polyneuropathy     Sleep apnea     Spondylosis of lumbar region without myelopathy or radiculopathy 10/25/2019    Thyroid nodule     Tobacco abuse     Tremor        PAST SURGICAL HISTORY:     Past Surgical History:   Procedure Laterality Date    APPENDECTOMY      CATARACT EXTRACTION      debra    ESOPHAGOGASTRODUODENOSCOPY      HAND SURGERY  04/30/2018    LIVER SURGERY  03/09/2018    HI REMV PILONIDAL LESION SIMPLE N/A 9/28/2018    Procedure: PILONIDAL CYSTECTOMY;  Surgeon: Luis Guo DO;  Location:  MAIN OR;  Service: General    THYROID SURGERY      TONSILLECTOMY         CURRENT MEDICATIONS:     Current Outpatient Medications   Medication Sig Dispense Refill    betamethasone, augmented, (DIPROLENE) 0 05 % ointment 2 (two) times a day Apply sparingly to affected area      budesonide-formoterol (Symbicort) 160-4 5 mcg/act inhaler Symbicort 160 mcg-4 5 mcg/actuation HFA aerosol inhaler   INHALE 2 PUFF BY INHALATION ROUTE 2 TIMES EVERY DAY IN THE MORNING AND EVENING      Cholecalciferol 1000 units CHEW Chew 5,000 Units daily        losartan (COZAAR) 100 MG tablet TAKE 1/2 TABLET BY MOUTH DAILY 30 tablet 0    albuterol (Ventolin HFA) 90 mcg/act inhaler Inhale 2 puffs every 6 (six) hours as needed for wheezing (Patient not taking: Reported on 8/12/2021) 1 Inhaler 0    hydrOXYzine HCL (ATARAX) 25 mg tablet TAKE 1 TABLET BY MOUTH EVERY 4 HOURS AS NEEDED FOR ANXIETY (Patient not taking: Reported on 8/12/2021)      Mirabegron ER 25 MG TB24 Take 25 mg by mouth daily for 30 doses 30 tablet 3    nicotine (NICODERM CQ) 21 mg/24 hr TD 24 hr patch Place 1 patch on the skin every 24 hours (Patient not taking: Reported on 8/12/2021) 28 patch 0    Omeprazole 20 MG TBEC Take 1 tablet by mouth daily  (Patient not taking: Reported on 8/12/2021)      propranolol (INDERAL) 80 mg tablet TAKE 1 TABLET BY MOUTH EVERY DAY (Patient not taking: Reported on 8/12/2021) 30 tablet 2    sertraline (ZOLOFT) 100 mg tablet TAKE 1 TABLET BY MOUTH EVERY DAY (Patient not taking: Reported on 8/12/2021) 30 tablet 2     No current facility-administered medications for this visit  ALLERGIES:     Allergies   Allergen Reactions    Pneumococcal Vaccine        SOCIAL HISTORY:     Social History     Socioeconomic History    Marital status:       Spouse name: None    Number of children: None    Years of education: None    Highest education level: None   Occupational History    Occupation:    Tobacco Use    Smoking status: Current Every Day Smoker     Packs/day: 1 50     Types: Cigarettes    Smokeless tobacco: Current User    Tobacco comment: hx of nicotine abuse 2-3 packs   Vaping Use    Vaping Use: Never used   Substance and Sexual Activity    Alcohol use: Yes     Comment: once a day    Drug use: No    Sexual activity: Not Currently     Partners: Female   Other Topics Concern    None   Social History Narrative    Hx of caffeine abuse    Hx of ETOH abuse     Social Determinants of Health     Financial Resource Strain: Low Risk     Difficulty of Paying Living Expenses: Not hard at all   Food Insecurity: No Food Insecurity    Worried About Running Out of Food in the Last Year: Never true    Marielle of Food in the Last Year: Never true   Transportation Needs: No Transportation Needs    Lack of Transportation (Medical): No    Lack of Transportation (Non-Medical): No   Physical Activity:     Days of Exercise per Week:     Minutes of Exercise per Session:    Stress:     Feeling of Stress :    Social Connections: Socially Isolated    Frequency of Communication with Friends and Family: More than three times a week    Frequency of Social Gatherings with Friends and Family: More than three times a week    Attends [employer] Services: Never    Active Member of Clubs or Organizations: No    Attends Club or Organization Meetings: Never    Marital Status:    Intimate Partner Violence: Not At Risk    Fear of Current or Ex-Partner: No    Emotionally Abused: No    Physically Abused: No    Sexually Abused: No       SOCIAL HISTORY:     Family History   Problem Relation Age of Onset    Diabetes Father     Other Father         colon problems/colostomy    Alcohol abuse Sister        REVIEW OF SYSTEMS:     Review of Systems   Constitutional: Positive for activity change and fatigue  Respiratory: Negative  Cardiovascular: Negative  Gastrointestinal: Positive for rectal pain  Genitourinary: Positive for difficulty urinating and enuresis  Musculoskeletal: Positive for gait problem  Skin: Positive for wound  Neurological: Positive for tremors and weakness  Psychiatric/Behavioral: Positive for behavioral problems           PHYSICAL EXAM: /76 (BP Location: Left arm, Patient Position: Sitting, Cuff Size: Adult)   Pulse 71   Ht 5' 8" (1 727 m)   Wt 69 9 kg (154 lb)   BMI 23 42 kg/m²     General:    Elderly and somewhat unhealthy appearing male  They have a normal  But somewhat confused affect  Patient has a mild baseline tremor  HEENT:  Normocephalic, atraumatic  Neck is supple without any palpable lymphadenopathy  Cardiovascular:  Patient has normal palpable distal radial pulses  There is no significant peripheral edema  No JVD is noted  Respiratory:  Patient has unlabored respirations  There is no audible wheeze or rhonchi  Abdomen:  Abdomen is   With upper quadrant surgical scars  Pelvic tattoos from prior pelvic radiation are noted  Abdomen is soft and nontender  There is no tympany  Inguinal and umbilical hernia are not appreciated  Genitourinary: no penile lesions or discharge, no testicular masses or tenderness, no hernias  Patient requested evaluation of the draining sinus  Patient has an open pilonidal cyst which does not appear infected does not appear to be draining any purulent material     Musculoskeletal:  Patient does not have significant CVA tenderness in the  flank with palpation or percussion  They full range of motion in all 4 extremities  Strength in all 4 extremities appears congruent  Patient is unsteady with standing and walking  Dermatologic:  Patient has no skin abnormalities or rashes        LABS:     CBC:   Lab Results   Component Value Date    WBC 4 2 2017    HGB 14 1 2018    HCT 41 2 2018     6 (H) 2017     (L) 2017       BMP:   Lab Results   Component Value Date    CALCIUM 9 5 2017    K 4 2 2017    CO2 34 (H) 2017     2017    BUN 11 2017    CREATININE 1 01 2017       IMAGIN/18/21 OUTSIDE US (CareEverywhere)  IMPRESSION: Urinary bladder retention and mild pelviectasis likely due to   enlarged prostate  Mild mucosal abnormality of the urinary bladder likely   related to prostate impression  Intravesical mass is less likely  This lesion   measures about 11 mm  Correlate with follow-up cystoscopy if symptoms persist      No renal stone  PATHOLOGY:     6/29/21 (OUTSIDE PATHOLOGY)  DIAGNOSIS  :    Bladder, mass, transurethral resection:  Invasive high-grade urothelial carcinoma, bladder primary, with  extensive subepithelial invasion and focal muscularis propria  invasion  Retraction artifact, no definitive lymphovascular invasion seen  pT2  Best block for additional testing A1  See comment  ASSESSMENT:     79 y o  male with multiple malignancies including hepatic and adenocarcinoma of the lung with new diagnosis of high-grade invasive bladder cancer on palliative immunotherapy    PLAN:       Unfortunately, the patient appears to have 3 separate malignancies  He is currently being treated with palliative immunotherapy with [facility] for his adenocarcinoma of the lung  I do not think that the patient is a candidate for definitive curative treatment of his high-risk invasive bladder cancer  We discussed that this would typically mean chemotherapy followed by radical consolidative cystoprostatectomy  Unfortunately, the patient is also not a candidate for second-line curative chemo and radiation therapy given his prior prostate radiation treatment  We discussed that immunotherapy is an available FDA approved option for patients with invasive bladder cancer cannot receive the above treatments  Patient is already receiving this for his lung disease  This is not a curative therapy but rather palliative  This is likely the best option given the patient's poor performance status and other disease  I will defer this treatment direction to the patient's primary oncologist       Because we are not attempting to achievecure, patient should undergo intermittent surveillance cystoscopies    He is deferring the option of following with [facility] group and so I have offered him cystoscopy 3 months following his bladder tumor resection which will be in September  Because there is no recent axial imaging of the abdomen and pelvis, I have ordered a CT chest abdomen pelvis for review  I expect to find persistent widespread disease  With regard to the patient's symptoms, he can certainly continue the Flomax medication however I have described to him the side effect of postural hypotension  This may exacerbate his underlying neurologic issues and tremors related to his alcohol abuse  I have offered him Myrbetriq as an alternative medication to calm down bladder irritation and spasms following biopsy and instillation of chemotherapy  Patient will call me if the medication is cost prohibitive  Most important, the patient has no family at home and history of alcohol abuse  I am unsure about his understanding of his disease and treatment  I have strongly recommended referral to our palliative care team to help frame goals of care and provide supportive resources  Patient is agreeable to this

## 2021-08-12 NOTE — LETTER
August 12, 2021     [place], [address]    Patient: Angel Arndt   YOB: 1940   Date of Visit: 8/12/2021       Dear Dr Veronica Santiago: Thank you for referring Angeli Dove to me for evaluation  Below are my notes for this consultation  If you have questions, please do not hesitate to call me  I look forward to following your patient along with you  Sincerely,        Eva Roper MD        CC: MD Eva Hodges MD  8/12/2021  2:13 PM  Sign when Signing Visit    UROLOGY TRANSFER OF CARE NOTE     CHIEF COMPLAINT   Angel Arndt is a 79 y o  male with a complaint of   Chief Complaint   Patient presents with    Bladder Cancer    Prostate Cancer     History of Present Illness:     79 y o  male with a history of alcoholism and diagnosis of hepatocellular carcinoma status post hepatectomy  He was then diagnosed with multifocal adenocarcinoma of the lung  Patient had lung biopsy and  And is following with Oncology at [facility], Dr Gustavo Diaz, who is administering [address] with palliative intent  During recent hospitalization, the patient was found have imaging that suggested bladder cancer  He underwent inpatient cystoscopy and bladder resection, with instillation of mitomycin-C on 6/29/2021 with Dr Alejandra Lorenzana  By report, the pathology was high-grade invasive urothelial carcinoma  This was a 2 cm lesion on the right side  Patient reports that he has not seen the operative urologist back for pathology discussion and is unaware of the pathology  There does appear to have been recent visits with his oncology doctor who has discussed this with him  He was previously scheduled for second opinion evaluation in my office but did not present for this appointment and was rescheduled for today  Patient has concerns and complaints about ongoing bladder irritation and leakage    He is on a medication nightly but does not recall which medication this is  Patient is a chronic alcohol user and reports 2 drinks of vodka per day  He reports that these are essentially 2 finger height port into a glass  He lives alone and does not have support system  He has also recently noticed a draining pimple from his backside that has been draining some purulence  He wishes to discuss this as well  No results found for: PSA    Past Medical History:     Past Medical History:   Diagnosis Date    Anemia     dur to blood loss in the past    BMI 25 0-25 9,adult 5/24/2019    BPH (benign prostatic hyperplasia) 2002    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[facility] )     Cancer (HCC)     hepatocellular, prostate    Chronic bronchitis (HCC)     Chronic bronchitis ([facility] ) 7/19/2016    Last Assessment & Plan:  He has symptoms of chronic bronchitis  He would of course benefit from smoking cessation  I have recommended a trial of Spiriva      Colitis     COPD (chronic obstructive pulmonary disease) (HCC)     chronic bronchitis    Decubitus ulcer of sacral region, stage 3 (HCC) 3/12/2018    Diverticulitis     Essential tremor     ETOH abuse     GERD (gastroesophageal reflux disease)     Gout     Hearing loss     Hemorrhoids     History of Lyme disease     History of thrombocytopenia     chronic mild    History of transfusion     HTN (hypertension)     Hydrocele     Hyperlipidemia     Hypertension     Hyperthyroidism     Irritable bowel syndrome     Liver disease     cirrhosis    Lyme disease     Meningioma (HCC)     Meningioma (HCC)     left frontal- followed by Neurosurgery    Nicotine abuse     2-3 packs    Obstructive sleep apnea     unable to tolerate CPAP    Pressure injury of skin     Stage 3 that healed and has reoccurred    Pulmonary nodule     Sensory polyneuropathy     Sleep apnea     Spondylosis of lumbar region without myelopathy or radiculopathy 10/25/2019    Thyroid nodule     Tobacco abuse     Tremor        PAST SURGICAL HISTORY:     Past Surgical History:   Procedure Laterality Date    APPENDECTOMY      CATARACT EXTRACTION      debra    ESOPHAGOGASTRODUODENOSCOPY      HAND SURGERY  04/30/2018    LIVER SURGERY  03/09/2018    MO REMV PILONIDAL LESION SIMPLE N/A 9/28/2018    Procedure: PILONIDAL CYSTECTOMY;  Surgeon: Blank Albright DO;  Location: [address] [place] [address];  Service: General    THYROID SURGERY      TONSILLECTOMY         CURRENT MEDICATIONS:     Current Outpatient Medications   Medication Sig Dispense Refill    betamethasone, augmented, (DIPROLENE) 0 05 % ointment 2 (two) times a day Apply sparingly to affected area      budesonide-formoterol (Symbicort) 160-4 5 mcg/act inhaler Symbicort 160 mcg-4 5 mcg/actuation HFA aerosol inhaler   INHALE 2 PUFF BY INHALATION ROUTE 2 TIMES EVERY DAY IN THE MORNING AND EVENING      Cholecalciferol 1000 units CHEW Chew 5,000 Units daily        losartan (COZAAR) 100 MG tablet TAKE 1/2 TABLET BY MOUTH DAILY 30 tablet 0    albuterol (Ventolin HFA) 90 mcg/act inhaler Inhale 2 puffs every 6 (six) hours as needed for wheezing (Patient not taking: Reported on 8/12/2021) 1 Inhaler 0    hydrOXYzine HCL (ATARAX) 25 mg tablet TAKE 1 TABLET BY MOUTH EVERY 4 HOURS AS NEEDED FOR ANXIETY (Patient not taking: Reported on 8/12/2021)      Mirabegron ER 25 MG TB24 Take 25 mg by mouth daily for 30 doses 30 tablet 3    nicotine (NICODERM CQ) 21 mg/24 hr TD 24 hr patch Place 1 patch on the skin every 24 hours (Patient not taking: Reported on 8/12/2021) 28 patch 0    Omeprazole 20 MG TBEC Take 1 tablet by mouth daily  (Patient not taking: Reported on 8/12/2021)      propranolol (INDERAL) 80 mg tablet TAKE 1 TABLET BY MOUTH EVERY DAY (Patient not taking: Reported on 8/12/2021) 30 tablet 2    sertraline (ZOLOFT) 100 mg tablet TAKE 1 TABLET BY MOUTH EVERY DAY (Patient not taking: Reported on 8/12/2021) 30 tablet 2     No current facility-administered medications for this visit         ALLERGIES:     Allergies Allergen Reactions    Pneumococcal Vaccine        SOCIAL HISTORY:     Social History     Socioeconomic History    Marital status:      Spouse name: None    Number of children: None    Years of education: None    Highest education level: None   Occupational History    Occupation:    Tobacco Use    Smoking status: Current Every Day Smoker     Packs/day: 1 50     Types: Cigarettes    Smokeless tobacco: Current User    Tobacco comment: hx of nicotine abuse 2-3 packs   Vaping Use    Vaping Use: Never used   Substance and Sexual Activity    Alcohol use: Yes     Comment: once a day    Drug use: No    Sexual activity: Not Currently     Partners: Female   Other Topics Concern    None   Social History Narrative    Hx of caffeine abuse    Hx of ETOH abuse     Social Determinants of Health     Financial Resource Strain: Low Risk     Difficulty of Paying Living Expenses: Not hard at all   Food Insecurity: No Food Insecurity    Worried About Running Out of Food in the Last Year: Never true    Marielle of Food in the Last Year: Never true   Transportation Needs: No Transportation Needs    Lack of Transportation (Medical): No    Lack of Transportation (Non-Medical): No   Physical Activity:     Days of Exercise per Week:     Minutes of Exercise per Session:    Stress:     Feeling of Stress :    Social Connections: Socially Isolated    Frequency of Communication with Friends and Family: More than three times a week    Frequency of Social Gatherings with Friends and Family: More than three times a week    Attends Denominational Services: Never    Active Member of Clubs or Organizations: No    Attends Club or Organization Meetings: Never    Marital Status:     Intimate Partner Violence: Not At Risk    Fear of Current or Ex-Partner: No    Emotionally Abused: No    Physically Abused: No    Sexually Abused: No       SOCIAL HISTORY:     Family History   Problem Relation Age of Onset  Diabetes Father     Other Father         colon problems/colostomy    Alcohol abuse Sister        REVIEW OF SYSTEMS:     Review of Systems   Constitutional: Positive for activity change and fatigue  Respiratory: Negative  Cardiovascular: Negative  Gastrointestinal: Positive for rectal pain  Genitourinary: Positive for difficulty urinating and enuresis  Musculoskeletal: Positive for gait problem  Skin: Positive for wound  Neurological: Positive for tremors and weakness  Psychiatric/Behavioral: Positive for behavioral problems  PHYSICAL EXAM:     /76 (BP Location: Left arm, Patient Position: Sitting, Cuff Size: Adult)   Pulse 71   Ht 5' 8" (1 727 m)   Wt 69 9 kg (154 lb)   BMI 23 42 kg/m²     General:    Elderly and somewhat unhealthy appearing male  They have a normal  But somewhat confused affect  Patient has a mild baseline tremor  HEENT:  Normocephalic, atraumatic  Neck is supple without any palpable lymphadenopathy  Cardiovascular:  Patient has normal palpable distal radial pulses  There is no significant peripheral edema  No JVD is noted  Respiratory:  Patient has unlabored respirations  There is no audible wheeze or rhonchi  Abdomen:  Abdomen is   With upper quadrant surgical scars  Pelvic tattoos from prior pelvic radiation are noted  Abdomen is soft and nontender  There is no tympany  Inguinal and umbilical hernia are not appreciated  Genitourinary: no penile lesions or discharge, no testicular masses or tenderness, no hernias  Patient requested evaluation of the draining sinus  Patient has an open pilonidal cyst which does not appear infected does not appear to be draining any purulent material     Musculoskeletal:  Patient does not have significant CVA tenderness in the  flank with palpation or percussion  They full range of motion in all 4 extremities  Strength in all 4 extremities appears congruent      Patient is unsteady with standing and walking  Dermatologic:  Patient has no skin abnormalities or rashes  LABS:     CBC:   Lab Results   Component Value Date    WBC 4 2 2017    HGB 14 1 2018    HCT 41 2 2018     6 (H) 2017     (L) 2017       BMP:   Lab Results   Component Value Date    CALCIUM 9 5 2017    K 4 2 2017    CO2 34 (H) 2017     2017    BUN 11 2017    CREATININE 1 01 2017       IMAGIN/18/21 [facility]IDE [facility] ([facility])  IMPRESSION: Urinary bladder retention and mild pelviectasis likely due to   enlarged prostate  Mild mucosal abnormality of the urinary bladder likely   related to prostate impression  Intravesical mass is less likely  This lesion   measures about 11 mm  Correlate with follow-up cystoscopy if symptoms persist      No renal stone  PATHOLOGY:     21 (OUTSIDE PATHOLOGY)  DIAGNOSIS  :    Bladder, mass, transurethral resection:  Invasive high-grade urothelial carcinoma, bladder primary, with  extensive subepithelial invasion and focal muscularis propria  invasion  Retraction artifact, no definitive lymphovascular invasion seen  pT2  Best block for additional testing A1  See comment  ASSESSMENT:     79 y o  male with multiple malignancies including hepatic and adenocarcinoma of the lung with new diagnosis of high-grade invasive bladder cancer on palliative immunotherapy    PLAN:       Unfortunately, the patient appears to have 3 separate malignancies  He is currently being treated with palliative immunotherapy with [facility] for his adenocarcinoma of the lung  I do not think that the patient is a candidate for definitive curative treatment of his high-risk invasive bladder cancer  We discussed that this would typically mean chemotherapy followed by radical consolidative cystoprostatectomy    Unfortunately, the patient is also not a candidate for second-line curative chemo and radiation therapy given his prior prostate radiation treatment  We discussed that immunotherapy is an available FDA approved option for patients with invasive bladder cancer cannot receive the above treatments  Patient is already receiving this for his lung disease  This is not a curative therapy but rather palliative  This is likely the best option given the patient's poor performance status and other disease  I will defer this treatment direction to the patient's primary oncologist       Because we are not attempting to achievecure, patient should undergo intermittent surveillance cystoscopies  He is deferring the option of following with [facility] and so I have offered him cystoscopy 3 months following his bladder tumor resection which will be in September  Because there is no recent axial imaging of the abdomen and pelvis, I have ordered a CT chest abdomen pelvis for review  I expect to find persistent widespread disease  With regard to the patient's symptoms, he can certainly continue the Flomax medication however I have described to him the side effect of postural hypotension  This may exacerbate his underlying neurologic issues and tremors related to his alcohol abuse  I have offered him Myrbetriq as an alternative medication to calm down bladder irritation and spasms following biopsy and instillation of chemotherapy  Patient will call me if the medication is cost prohibitive  Most important, the patient has no family at home and history of alcohol abuse  I am unsure about his understanding of his disease and treatment  I have strongly recommended referral to our palliative care team to help frame goals of care and provide supportive resources  Patient is agreeable to this

## 2021-08-16 ENCOUNTER — TELEPHONE (OUTPATIENT)
Dept: PALLIATIVE MEDICINE | Facility: CLINIC | Age: 81
End: 2021-08-16

## 2021-08-17 DIAGNOSIS — E04.1 THYROID NODULE: Primary | ICD-10-CM

## 2021-08-17 DIAGNOSIS — Z13.220 ENCOUNTER FOR SCREENING FOR LIPID DISORDER: ICD-10-CM

## 2021-08-18 ENCOUNTER — PATIENT OUTREACH (OUTPATIENT)
Dept: FAMILY MEDICINE CLINIC | Facility: CLINIC | Age: 81
End: 2021-08-18

## 2021-08-19 ENCOUNTER — PATIENT OUTREACH (OUTPATIENT)
Dept: FAMILY MEDICINE CLINIC | Facility: CLINIC | Age: 81
End: 2021-08-19

## 2021-08-19 ENCOUNTER — PATIENT OUTREACH (OUTPATIENT)
Dept: CASE MANAGEMENT | Facility: HOSPITAL | Age: 81
End: 2021-08-19

## 2021-08-19 ENCOUNTER — RA CDI HCC (OUTPATIENT)
Dept: OTHER | Facility: HOSPITAL | Age: 81
End: 2021-08-19

## 2021-08-19 DIAGNOSIS — Z78.9 NEEDS ASSISTANCE WITH COMMUNITY RESOURCES: Primary | ICD-10-CM

## 2021-08-19 NOTE — PROGRESS NOTES
CHW received referral from Maddie Mishra RN to assist pt with lanta  CHW made outreach call to pt and there was no answer left voicemail with contact details for a call back

## 2021-08-19 NOTE — PROGRESS NOTES
[facility]  coding opportunities          Number of diagnosis code(s) already on the problem list added to FYI fla                     Patients insurance company: [address]  (Medicare Advantage and [email or web address])     Visit status: Patient canceled the appointment        [facility]  coding opportunities          Number of diagnosis code(s) already on the problem list added to FYI fla     F10 20 Alcohol dependence, daily use ([facility] )    Next appointment 21                  Patients insurance company: [address]  (Medicare Advantage and [email or web address])

## 2021-08-19 NOTE — PROGRESS NOTES
Contacted patient for f/u  Ed lives alone and does not have car and relies on neighbors to take him to appointments  He received Josh Crawleyger application in the hospital but never sent it in  Will make referral to CHW to assist with filling out application  He is receiving immunotherapy infusions, next scheduled for September  He denies any respiratory difficulties, wheezing, cough or sob  He reports a diminished appetite but does receive meals on wheels service  He c/o pain in his legs which limits ambulation mostly the morning but after taking ibuprofen, is able to ambulate without issue  He is taking medications but having issues with cost   JAS WALLACE has called patient many times, he states he did not receive messages  Provided him with her contact information and encouraged him to call  Follow up appointments scheduled  He has not scheduled palliative care appointment, provided him with the contact information  He denies needing any assistance at home at this time  Agreeable to continued outreach

## 2021-08-23 ENCOUNTER — PATIENT OUTREACH (OUTPATIENT)
Dept: CASE MANAGEMENT | Facility: HOSPITAL | Age: 81
End: 2021-08-23

## 2021-08-23 ENCOUNTER — TELEPHONE (OUTPATIENT)
Dept: UROLOGY | Facility: AMBULATORY SURGERY CENTER | Age: 81
End: 2021-08-23

## 2021-08-23 DIAGNOSIS — C67.9 UROTHELIAL CARCINOMA OF BLADDER (HCC): Primary | ICD-10-CM

## 2021-08-23 DIAGNOSIS — N32.89 BLADDER SPASM: ICD-10-CM

## 2021-08-23 NOTE — TELEPHONE ENCOUNTER
Paramjit Albert  This patients CT scans are denied with the insurance  I scheduled a peer to peer to be done 8/25/21 at 1:30pm  Please let me know as soon as you hear due to him being on later in the evening for the study    Thanks  Charity Ramires

## 2021-08-25 ENCOUNTER — HOSPITAL ENCOUNTER (OUTPATIENT)
Dept: CT IMAGING | Facility: HOSPITAL | Age: 81
Discharge: HOME | End: 2021-08-25
Attending: UROLOGY
Payer: COMMERCIAL

## 2021-08-25 DIAGNOSIS — C67.9 UROTHELIAL CARCINOMA OF BLADDER (HCC): ICD-10-CM

## 2021-08-25 PROCEDURE — G1004 CDSM NDSC: HCPCS

## 2021-08-25 PROCEDURE — 74178 CT ABD&PLV WO CNTR FLWD CNTR: CPT

## 2021-08-25 RX ADMIN — IOHEXOL 100 ML: 350 INJECTION, SOLUTION INTRAVENOUS at 17:34

## 2021-08-25 NOTE — TELEPHONE ENCOUNTER
PET scan 2/5/21 known pulm masses, no disease in abd pelvis    CTA chest 7/21/21 with stable to slightly decreased pulmonary nodules new 5mm nodule right lower lobe  Further chest imaging not approved      CT a/p w/wo is approved with auth# below for initial staging Bladder CA    [id number]  180 days

## 2021-08-26 NOTE — TELEPHONE ENCOUNTER
Left message on machine for patient to return call to the office to schedule a consult appt with palliative care

## 2021-08-27 ENCOUNTER — PATIENT OUTREACH (OUTPATIENT)
Dept: CASE MANAGEMENT | Facility: HOSPITAL | Age: 81
End: 2021-08-27

## 2021-08-27 NOTE — PROGRESS NOTES
CHW made 3rd attempt to outreach pt to discuss his lanta application  Left message with my contact details for a call back

## 2021-08-30 ENCOUNTER — PATIENT OUTREACH (OUTPATIENT)
Dept: CASE MANAGEMENT | Facility: HOSPITAL | Age: 81
End: 2021-08-30

## 2021-08-31 ENCOUNTER — PATIENT OUTREACH (OUTPATIENT)
Dept: FAMILY MEDICINE CLINIC | Facility: CLINIC | Age: 81
End: 2021-08-31

## 2021-08-31 NOTE — PROGRESS NOTES
Three outreach attempts by JAS WALLACE to contact patient for possible medication assistance  Patient has not returned calls  JAS WALLACE is closing referral at this time, but remains available for additional support as needed via order

## 2021-09-03 RX ORDER — OXYBUTYNIN CHLORIDE 5 MG/1
5 TABLET, EXTENDED RELEASE ORAL DAILY
Qty: 30 TABLET | Refills: 0 | Status: SHIPPED | OUTPATIENT
Start: 2021-09-03 | End: 2021-09-27

## 2021-09-03 NOTE — TELEPHONE ENCOUNTER
Called patient and made him aware that Ditropan was sent to his pharmacy as an alternative for the Myrbetriq  He verbalized understanding

## 2021-09-03 NOTE — TELEPHONE ENCOUNTER
Patient called stating he has been getting up constantly to go to the bathroom  He only slept around 45 minutes  He stated he cannot continue like this  He stated the medication prescribed was to expensive  Mirabegron ER 25 mg  He stated it was over 100 dollars  He is only on social security and he cannot afford it  Please review and see what else he can take  Please return the call when new prescription is sent to pharmacy

## 2021-09-09 ENCOUNTER — TELEPHONE (OUTPATIENT)
Dept: FAMILY MEDICINE CLINIC | Facility: CLINIC | Age: 81
End: 2021-09-09

## 2021-09-09 ENCOUNTER — PATIENT OUTREACH (OUTPATIENT)
Dept: FAMILY MEDICINE CLINIC | Facility: CLINIC | Age: 81
End: 2021-09-09

## 2021-09-09 NOTE — TELEPHONE ENCOUNTER
Barbara Oconnor from [facility] called [phone] left message if he had apt in office-left message that he has apt for mcr pe and memory test on 9/27/21 at 11am if questions can call us back

## 2021-09-14 NOTE — TELEPHONE ENCOUNTER
Left a message for patient to call office back to schedule an appointment with Palliative Care  Called 3x close out to task, awaiting a call back

## 2021-09-16 ENCOUNTER — PATIENT OUTREACH (OUTPATIENT)
Dept: FAMILY MEDICINE CLINIC | Facility: CLINIC | Age: 81
End: 2021-09-16

## 2021-09-16 DIAGNOSIS — F33.0 MILD EPISODE OF RECURRENT MAJOR DEPRESSIVE DISORDER (HCC): ICD-10-CM

## 2021-09-16 RX ORDER — SERTRALINE HYDROCHLORIDE 100 MG/1
TABLET, FILM COATED ORAL
Qty: 30 TABLET | Refills: 2 | Status: SHIPPED | OUTPATIENT
Start: 2021-09-16 | End: 2021-12-06 | Stop reason: SDUPTHER

## 2021-09-21 ENCOUNTER — TELEPHONE (OUTPATIENT)
Dept: UROLOGY | Facility: CLINIC | Age: 81
End: 2021-09-21

## 2021-09-21 NOTE — TELEPHONE ENCOUNTER
Patient had a 2:15 p m  appointment for discussion of his CT findings and additional options for surveillance of his now diagnosed muscle invasive bladder cancer  He unfortunately did not present for his to 15 appointment  This was despite reminder calls and assistance with transportation  This is the patient's 2nd no-show visit

## 2021-09-23 ENCOUNTER — PATIENT OUTREACH (OUTPATIENT)
Dept: FAMILY MEDICINE CLINIC | Facility: CLINIC | Age: 81
End: 2021-09-23

## 2021-09-25 DIAGNOSIS — C67.9 UROTHELIAL CARCINOMA OF BLADDER (HCC): ICD-10-CM

## 2021-09-25 DIAGNOSIS — N32.89 BLADDER SPASM: ICD-10-CM

## 2021-09-27 RX ORDER — OXYBUTYNIN CHLORIDE 5 MG/1
TABLET, EXTENDED RELEASE ORAL
Qty: 30 TABLET | Refills: 0 | Status: SHIPPED | OUTPATIENT
Start: 2021-09-27 | End: 2022-03-25 | Stop reason: SDUPTHER

## 2021-10-13 DIAGNOSIS — I10 ESSENTIAL HYPERTENSION: ICD-10-CM

## 2021-10-13 RX ORDER — LOSARTAN POTASSIUM 100 MG/1
TABLET ORAL
Qty: 30 TABLET | Refills: 0 | Status: SHIPPED | OUTPATIENT
Start: 2021-10-13 | End: 2021-12-06

## 2021-10-14 NOTE — TELEPHONE ENCOUNTER
Pt calling to reschedule no show 9/21 cysto states he's been having blood in urine x 1 week,please contact him directly

## 2021-10-15 ENCOUNTER — TELEPHONE (OUTPATIENT)
Dept: UROLOGY | Facility: AMBULATORY SURGERY CENTER | Age: 81
End: 2021-10-15

## 2021-10-15 NOTE — TELEPHONE ENCOUNTER
Called [phone] and left a voicemail for patient to please contact the office to discuss appointment scheduling

## 2021-11-02 ENCOUNTER — TELEPHONE (OUTPATIENT)
Dept: ADMINISTRATIVE | Facility: OTHER | Age: 81
End: 2021-11-02

## 2021-11-03 ENCOUNTER — OFFICE VISIT (OUTPATIENT)
Dept: FAMILY MEDICINE CLINIC | Facility: CLINIC | Age: 81
End: 2021-11-03
Payer: COMMERCIAL

## 2021-11-03 VITALS
WEIGHT: 166 LBS | TEMPERATURE: 97.8 F | BODY MASS INDEX: 25.16 KG/M2 | DIASTOLIC BLOOD PRESSURE: 70 MMHG | HEART RATE: 86 BPM | OXYGEN SATURATION: 97 % | HEIGHT: 68 IN | SYSTOLIC BLOOD PRESSURE: 130 MMHG

## 2021-11-03 DIAGNOSIS — Z00.00 MEDICARE ANNUAL WELLNESS VISIT, SUBSEQUENT: Primary | ICD-10-CM

## 2021-11-03 DIAGNOSIS — E66.3 OVERWEIGHT WITH BODY MASS INDEX (BMI) OF 25 TO 25.9 IN ADULT: ICD-10-CM

## 2021-11-03 DIAGNOSIS — J44.9 CHRONIC OBSTRUCTIVE PULMONARY DISEASE, UNSPECIFIED COPD TYPE (HCC): ICD-10-CM

## 2021-11-03 DIAGNOSIS — E04.1 THYROID NODULE: ICD-10-CM

## 2021-11-03 DIAGNOSIS — F10.20 ALCOHOL DEPENDENCE, DAILY USE (HCC): ICD-10-CM

## 2021-11-03 DIAGNOSIS — C67.9 UROTHELIAL CARCINOMA OF BLADDER (HCC): ICD-10-CM

## 2021-11-03 DIAGNOSIS — R26.9 GAIT ABNORMALITY: ICD-10-CM

## 2021-11-03 DIAGNOSIS — I10 PRIMARY HYPERTENSION: ICD-10-CM

## 2021-11-03 DIAGNOSIS — Z72.0 TOBACCO ABUSE: ICD-10-CM

## 2021-11-03 PROCEDURE — G0439 PPPS, SUBSEQ VISIT: HCPCS | Performed by: FAMILY MEDICINE

## 2021-11-03 PROCEDURE — 1125F AMNT PAIN NOTED PAIN PRSNT: CPT | Performed by: FAMILY MEDICINE

## 2021-11-03 PROCEDURE — 4004F PT TOBACCO SCREEN RCVD TLK: CPT | Performed by: FAMILY MEDICINE

## 2021-11-03 PROCEDURE — 3288F FALL RISK ASSESSMENT DOCD: CPT | Performed by: FAMILY MEDICINE

## 2021-11-03 PROCEDURE — 99214 OFFICE O/P EST MOD 30 MIN: CPT | Performed by: FAMILY MEDICINE

## 2021-11-03 PROCEDURE — 1160F RVW MEDS BY RX/DR IN RCRD: CPT | Performed by: FAMILY MEDICINE

## 2021-11-03 PROCEDURE — 1170F FXNL STATUS ASSESSED: CPT | Performed by: FAMILY MEDICINE

## 2021-11-03 PROCEDURE — 3725F SCREEN DEPRESSION PERFORMED: CPT | Performed by: FAMILY MEDICINE

## 2021-11-03 PROCEDURE — 3075F SYST BP GE 130 - 139MM HG: CPT | Performed by: FAMILY MEDICINE

## 2021-11-03 PROCEDURE — 3078F DIAST BP <80 MM HG: CPT | Performed by: FAMILY MEDICINE

## 2021-11-03 RX ORDER — ALBUTEROL SULFATE 90 UG/1
2 AEROSOL, METERED RESPIRATORY (INHALATION) EVERY 6 HOURS PRN
Qty: 18 G | Refills: 1 | Status: SHIPPED | OUTPATIENT
Start: 2021-11-03 | End: 2022-02-16
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
## 2021-11-06 PROBLEM — E66.3 OVERWEIGHT WITH BODY MASS INDEX (BMI) OF 25 TO 25.9 IN ADULT: Status: ACTIVE | Noted: 2021-11-03

## 2021-11-06 PROBLEM — R26.9 GAIT ABNORMALITY: Status: ACTIVE | Noted: 2021-11-03

## 2021-11-06 PROBLEM — R26.9 GAIT ABNORMALITY: Status: ACTIVE | Noted: 2021-11-06

## 2021-11-06 PROBLEM — E66.3 OVERWEIGHT WITH BODY MASS INDEX (BMI) OF 25 TO 25.9 IN ADULT: Status: ACTIVE | Noted: 2021-11-06

## 2021-11-06 PROBLEM — I10 BENIGN ESSENTIAL HTN: Status: RESOLVED | Noted: 2021-04-05 | Resolved: 2021-11-06

## 2021-12-05 DIAGNOSIS — I10 ESSENTIAL HYPERTENSION: ICD-10-CM

## 2021-12-06 DIAGNOSIS — F33.0 MILD EPISODE OF RECURRENT MAJOR DEPRESSIVE DISORDER (HCC): ICD-10-CM

## 2021-12-06 RX ORDER — SERTRALINE HYDROCHLORIDE 100 MG/1
100 TABLET, FILM COATED ORAL DAILY
Qty: 30 TABLET | Refills: 2 | Status: SHIPPED | OUTPATIENT
Start: 2021-12-06 | End: 2022-03-25 | Stop reason: SDUPTHER

## 2021-12-06 RX ORDER — LOSARTAN POTASSIUM 100 MG/1
TABLET ORAL
Qty: 30 TABLET | Refills: 0 | Status: SHIPPED | OUTPATIENT
Start: 2021-12-06 | End: 2022-03-25 | Stop reason: DRUGHIGH

## 2022-03-10 DIAGNOSIS — I10 PRIMARY HYPERTENSION: Primary | ICD-10-CM

## 2022-03-10 RX ORDER — LOSARTAN POTASSIUM 50 MG/1
TABLET ORAL
Qty: 30 TABLET | Refills: 10 | Status: SHIPPED | OUTPATIENT
Start: 2022-03-10 | End: 2022-03-25 | Stop reason: SDUPTHER

## 2022-03-21 ENCOUNTER — RA CDI HCC (OUTPATIENT)
Dept: OTHER | Facility: HOSPITAL | Age: 82
End: 2022-03-21

## 2022-03-21 NOTE — PROGRESS NOTES
[facility]  coding opportunities          Chart Reviewed number of suggestions sent to Provider: 2  C22 0 Liver Cancer  C78 0 Lung Met     Patients Insurance     Medicare Insurance: [facility]

## 2022-03-25 ENCOUNTER — OFFICE VISIT (OUTPATIENT)
Dept: FAMILY MEDICINE CLINIC | Facility: CLINIC | Age: 82
End: 2022-03-25
Payer: COMMERCIAL

## 2022-03-25 VITALS
HEART RATE: 80 BPM | TEMPERATURE: 97.2 F | HEIGHT: 68 IN | OXYGEN SATURATION: 97 % | SYSTOLIC BLOOD PRESSURE: 140 MMHG | DIASTOLIC BLOOD PRESSURE: 80 MMHG | WEIGHT: 155 LBS | BODY MASS INDEX: 23.49 KG/M2

## 2022-03-25 DIAGNOSIS — C67.9 UROTHELIAL CARCINOMA OF BLADDER (HCC): Primary | ICD-10-CM

## 2022-03-25 DIAGNOSIS — J44.9 CHRONIC OBSTRUCTIVE PULMONARY DISEASE, UNSPECIFIED COPD TYPE (HCC): ICD-10-CM

## 2022-03-25 DIAGNOSIS — I10 PRIMARY HYPERTENSION: ICD-10-CM

## 2022-03-25 DIAGNOSIS — R73.01 IMPAIRED FASTING GLUCOSE: ICD-10-CM

## 2022-03-25 DIAGNOSIS — F33.0 MILD EPISODE OF RECURRENT MAJOR DEPRESSIVE DISORDER (HCC): ICD-10-CM

## 2022-03-25 DIAGNOSIS — D69.6 THROMBOCYTOPENIA (HCC): ICD-10-CM

## 2022-03-25 DIAGNOSIS — E03.2 HYPOTHYROIDISM DUE TO MEDICATION: ICD-10-CM

## 2022-03-25 DIAGNOSIS — C22.0 HEPATOCELLULAR CARCINOMA (HCC): ICD-10-CM

## 2022-03-25 DIAGNOSIS — K70.30 ALCOHOLIC CIRRHOSIS OF LIVER WITHOUT ASCITES (HCC): ICD-10-CM

## 2022-03-25 DIAGNOSIS — F10.20 ALCOHOL DEPENDENCE, DAILY USE (HCC): ICD-10-CM

## 2022-03-25 DIAGNOSIS — E43 SEVERE PROTEIN-ENERGY MALNUTRITION (HCC): ICD-10-CM

## 2022-03-25 PROCEDURE — 99214 OFFICE O/P EST MOD 30 MIN: CPT | Performed by: FAMILY MEDICINE

## 2022-03-25 PROCEDURE — 1036F TOBACCO NON-USER: CPT | Performed by: FAMILY MEDICINE

## 2022-03-25 PROCEDURE — 1101F PT FALLS ASSESS-DOCD LE1/YR: CPT | Performed by: FAMILY MEDICINE

## 2022-03-25 PROCEDURE — 3077F SYST BP >= 140 MM HG: CPT | Performed by: FAMILY MEDICINE

## 2022-03-25 PROCEDURE — 3079F DIAST BP 80-89 MM HG: CPT | Performed by: FAMILY MEDICINE

## 2022-03-25 PROCEDURE — 1160F RVW MEDS BY RX/DR IN RCRD: CPT | Performed by: FAMILY MEDICINE

## 2022-03-25 PROCEDURE — 3288F FALL RISK ASSESSMENT DOCD: CPT | Performed by: FAMILY MEDICINE

## 2022-03-25 RX ORDER — OXYBUTYNIN CHLORIDE 5 MG/1
5 TABLET, EXTENDED RELEASE ORAL DAILY
Qty: 30 TABLET | Refills: 2 | Status: SHIPPED | OUTPATIENT
Start: 2022-03-25 | End: 2022-07-26 | Stop reason: SDDI

## 2022-03-25 RX ORDER — BUDESONIDE AND FORMOTEROL FUMARATE DIHYDRATE 160; 4.5 UG/1; UG/1
2 AEROSOL RESPIRATORY (INHALATION) 2 TIMES DAILY
Qty: 10.2 G | Refills: 2 | Status: SHIPPED | OUTPATIENT
Start: 2022-03-25

## 2022-03-25 RX ORDER — HYDROXYZINE HYDROCHLORIDE 25 MG/1
TABLET, FILM COATED ORAL
COMMUNITY
Start: 2022-02-16 | End: 2022-07-26 | Stop reason: SDDI

## 2022-03-25 RX ORDER — LOSARTAN POTASSIUM 50 MG/1
50 TABLET ORAL DAILY
Qty: 30 TABLET | Refills: 10 | Status: SHIPPED | OUTPATIENT
Start: 2022-03-25

## 2022-03-25 RX ORDER — SERTRALINE HYDROCHLORIDE 100 MG/1
100 TABLET, FILM COATED ORAL DAILY
Qty: 30 TABLET | Refills: 2 | Status: SHIPPED | OUTPATIENT
Start: 2022-03-25 | End: 2022-07-26 | Stop reason: SDDI

## 2022-03-25 NOTE — PROGRESS NOTES
Subjective:   Chief Complaint   Patient presents with    Follow-up     chronic conditions        Patient ID: Warren Amor is a [de-identified] y o  male  Patient here follow-up with a chronic condition including COPD hypertension patient history of cirrhosis and hepatocellular carcinoma history of bladder cancer patient and has not been compliant with the medication and not taking it properly and the right time daily and patient also since COVID day did not follow through with his appointment with the Pulmonary and the Oncology a no recent blood work      The following portions of the patient's history were reviewed and updated as appropriate: allergies, current medications, past family history, past medical history, past social history, past surgical history and problem list     Review of Systems   Constitutional: Negative for activity change, appetite change, fatigue and fever  HENT: Negative for congestion, ear pain, sinus pressure, sinus pain and sore throat  Eyes: Negative for pain, discharge, redness and itching  Respiratory: Negative for cough, chest tightness, shortness of breath and stridor  Cardiovascular: Negative for chest pain, palpitations and leg swelling  Gastrointestinal: Negative for abdominal pain, blood in stool, constipation, diarrhea and nausea  Genitourinary: Negative for dysuria, flank pain, frequency and hematuria  Musculoskeletal: Negative for back pain, joint swelling and neck pain  Skin: Negative for pallor and rash  Neurological: Negative for dizziness, weakness, numbness and headaches  Hematological: Does not bruise/bleed easily  Objective:  Vitals:    03/25/22 1107   BP: 140/80   Pulse: 80   Temp: (!) 97 2 °F (36 2 °C)   TempSrc: Tympanic   SpO2: 97%   Weight: 70 3 kg (155 lb)   Height: 5' 7 5" (1 715 m)      Physical Exam  Vitals and nursing note reviewed  Constitutional:       General: He is not in acute distress  Appearance: Normal appearance  He is well-developed  He is not diaphoretic  HENT:      Head: Normocephalic  Right Ear: Tympanic membrane, ear canal and external ear normal       Left Ear: Tympanic membrane, ear canal and external ear normal       Nose: Nose normal  No congestion or rhinorrhea  Mouth/Throat:      Mouth: Mucous membranes are moist       Pharynx: Oropharynx is clear  No oropharyngeal exudate or posterior oropharyngeal erythema  Eyes:      General:         Right eye: No discharge  Left eye: No discharge  Conjunctiva/sclera: Conjunctivae normal    Neck:      Vascular: No JVD  Cardiovascular:      Rate and Rhythm: Normal rate and regular rhythm  Heart sounds: Normal heart sounds  No murmur heard  No gallop  Pulmonary:      Effort: Pulmonary effort is normal  No respiratory distress  Breath sounds: Normal breath sounds  No stridor  No wheezing or rales  Chest:      Chest wall: No tenderness  Abdominal:      General: There is no distension  Palpations: Abdomen is soft  There is no mass  Tenderness: There is no abdominal tenderness  There is no rebound  Musculoskeletal:         General: No tenderness  Normal range of motion  Cervical back: Normal range of motion and neck supple  Lymphadenopathy:      Cervical: No cervical adenopathy  Skin:     General: Skin is warm  Findings: No erythema or rash  Neurological:      Mental Status: He is alert and oriented to person, place, and time  Sensory: No sensory deficit        Gait: Gait normal    Psychiatric:         Mood and Affect: Mood normal          Behavior: Behavior normal            Assessment/Plan:    Hypertension  Chronic asymptomatic a blood pressure today 140/80 a per patient he get nervous when he comes to see provider patient currently on losartan 50 mg once a day continue current management low-salt diet review with the patient    Mild episode of recurrent major depressive disorder (HCC)  A chronic asymptomatic patient has not been taking the Zoloft daily will recommend compliant with the medication continue Zoloft 100 milligram once a day    COPD (chronic obstructive pulmonary disease) (HCC)  A chronic no recent exacerbation or hospitalization rarely use albuterol inhaler a a continue Symbicort the patient did not see pulmonary for long time recommend to call for appointment follow-up periodically    Hepatocellular carcinoma ([facility] )  The patient's history of alcoholic cirrhosis and the hepato cell carcinoma status post surgery patient did not see Oncology for while recommend to call for appointment    Alcoholic cirrhosis of liver ([facility] )  Recommend to call for GI appointment periodically    Urothelial carcinoma of bladder ([facility] )  Patient does follow up with the Urology periodically he is on Ditropan XL 5 milligram once a day    Severe protein-energy malnutrition ([facility] )  Malnutrition Findings:     worst in muscle an upper extremity and the face patient lost 10 pound from previous visit a BMI a a 23 9 to a discussed increased frequency an protein supplement                             BMI Findings: Body mass index is 23 92 kg/m²  Diagnoses and all orders for this visit:    Urothelial carcinoma of bladder (HCC)  -     oxybutynin (DITROPAN-XL) 5 mg 24 hr tablet; Take 1 tablet (5 mg total) by mouth daily  -     CBC and differential; Future  -     Comprehensive metabolic panel; Future  -     Lipid Panel with Direct LDL reflex; Future  -     TSH, 3rd generation with Free T4 reflex; Future  -     Vitamin D 25 hydroxy; Future    Severe protein-energy malnutrition (HCC)  -     CBC and differential; Future  -     Comprehensive metabolic panel; Future  -     Lipid Panel with Direct LDL reflex; Future  -     TSH, 3rd generation with Free T4 reflex; Future  -     Vitamin D 25 hydroxy;  Future    Chronic obstructive pulmonary disease, unspecified COPD type (HCC)  -     budesonide-formoterol (Symbicort) 160-4 5 mcg/act inhaler; Inhale 2 puffs 2 (two) times a day Rinse mouth after use  -     CBC and differential; Future  -     Comprehensive metabolic panel; Future  -     Lipid Panel with Direct LDL reflex; Future  -     TSH, 3rd generation with Free T4 reflex; Future  -     Vitamin D 25 hydroxy; Future    Alcohol dependence, daily use (HCC)    Mild episode of recurrent major depressive disorder (HCC)  -     sertraline (ZOLOFT) 100 mg tablet; Take 1 tablet (100 mg total) by mouth daily    Thrombocytopenia (HCC)  -     CBC and differential; Future  -     Comprehensive metabolic panel; Future  -     Lipid Panel with Direct LDL reflex; Future  -     TSH, 3rd generation with Free T4 reflex; Future  -     Vitamin D 25 hydroxy; Future    Primary hypertension  -     losartan (COZAAR) 50 mg tablet; Take 1 tablet (50 mg total) by mouth daily  -     CBC and differential; Future  -     Comprehensive metabolic panel; Future  -     Lipid Panel with Direct LDL reflex; Future  -     TSH, 3rd generation with Free T4 reflex; Future  -     Vitamin D 25 hydroxy; Future    Impaired fasting glucose  -     CBC and differential; Future  -     Comprehensive metabolic panel; Future  -     Lipid Panel with Direct LDL reflex; Future  -     TSH, 3rd generation with Free T4 reflex; Future  -     Vitamin D 25 hydroxy; Future    Hypothyroidism due to medication  -     CBC and differential; Future  -     Comprehensive metabolic panel; Future  -     Lipid Panel with Direct LDL reflex; Future  -     TSH, 3rd generation with Free T4 reflex; Future  -     Vitamin D 25 hydroxy;  Future    Alcoholic cirrhosis of liver without ascites (HCC)    Hepatocellular carcinoma (HCC)    Other orders  -     hydrOXYzine HCL (ATARAX) 25 mg tablet

## 2022-03-27 PROBLEM — E66.3 OVERWEIGHT WITH BODY MASS INDEX (BMI) OF 25 TO 25.9 IN ADULT: Status: RESOLVED | Noted: 2021-11-03 | Resolved: 2022-03-27

## 2022-03-27 NOTE — ASSESSMENT & PLAN NOTE
A chronic no recent exacerbation or hospitalization rarely use albuterol inhaler a a continue Symbicort the patient did not see pulmonary for long time recommend to call for appointment follow-up periodically

## 2022-03-27 NOTE — ASSESSMENT & PLAN NOTE
A chronic asymptomatic patient has not been taking the Zoloft daily will recommend compliant with the medication continue Zoloft 100 milligram once a day

## 2022-03-27 NOTE — ASSESSMENT & PLAN NOTE
Chronic asymptomatic a blood pressure today 140/80 a per patient he get nervous when he comes to see provider patient currently on losartan 50 mg once a day continue current management low-salt diet review with the patient

## 2022-03-27 NOTE — ASSESSMENT & PLAN NOTE
The patient's history of alcoholic cirrhosis and the hepato cell carcinoma status post surgery patient did not see Oncology for while recommend to call for appointment

## 2022-03-27 NOTE — ASSESSMENT & PLAN NOTE
Malnutrition Findings:     worst in muscle an upper extremity and the face patient lost 10 pound from previous visit a BMI a a 23 9 to a discussed increased frequency an protein supplement                             BMI Findings: Body mass index is 23 92 kg/m²

## 2022-04-28 ENCOUNTER — OFFICE VISIT (OUTPATIENT)
Dept: UROLOGY | Facility: MEDICAL CENTER | Age: 82
End: 2022-04-28
Payer: COMMERCIAL

## 2022-04-28 VITALS
HEART RATE: 101 BPM | BODY MASS INDEX: 22.58 KG/M2 | DIASTOLIC BLOOD PRESSURE: 80 MMHG | WEIGHT: 149 LBS | HEIGHT: 68 IN | SYSTOLIC BLOOD PRESSURE: 130 MMHG

## 2022-04-28 DIAGNOSIS — C78.02 MALIGNANT NEOPLASM METASTATIC TO BOTH LUNGS (HCC): ICD-10-CM

## 2022-04-28 DIAGNOSIS — C67.9 UROTHELIAL CARCINOMA OF BLADDER (HCC): Primary | ICD-10-CM

## 2022-04-28 DIAGNOSIS — N40.1 BPH WITH URINARY OBSTRUCTION: ICD-10-CM

## 2022-04-28 DIAGNOSIS — C78.01 MALIGNANT NEOPLASM METASTATIC TO BOTH LUNGS (HCC): ICD-10-CM

## 2022-04-28 DIAGNOSIS — C34.31 MALIGNANT NEOPLASM OF LOWER LOBE OF RIGHT LUNG (HCC): ICD-10-CM

## 2022-04-28 DIAGNOSIS — C79.9 METASTATIC MALIGNANT NEOPLASM, UNSPECIFIED SITE (HCC): ICD-10-CM

## 2022-04-28 DIAGNOSIS — N13.8 BPH WITH URINARY OBSTRUCTION: ICD-10-CM

## 2022-04-28 LAB
SL AMB  POCT GLUCOSE, UA: ABNORMAL
SL AMB LEUKOCYTE ESTERASE,UA: ABNORMAL
SL AMB POCT BILIRUBIN,UA: ABNORMAL
SL AMB POCT BLOOD,UA: ABNORMAL
SL AMB POCT CLARITY,UA: ABNORMAL
SL AMB POCT COLOR,UA: ABNORMAL
SL AMB POCT KETONES,UA: 15
SL AMB POCT NITRITE,UA: ABNORMAL
SL AMB POCT PH,UA: 5.5
SL AMB POCT SPECIFIC GRAVITY,UA: 1.02
SL AMB POCT URINE PROTEIN: 30
SL AMB POCT UROBILINOGEN: 4

## 2022-04-28 PROCEDURE — 3079F DIAST BP 80-89 MM HG: CPT | Performed by: UROLOGY

## 2022-04-28 PROCEDURE — 99214 OFFICE O/P EST MOD 30 MIN: CPT | Performed by: UROLOGY

## 2022-04-28 PROCEDURE — 1036F TOBACCO NON-USER: CPT | Performed by: UROLOGY

## 2022-04-28 PROCEDURE — 81003 URINALYSIS AUTO W/O SCOPE: CPT | Performed by: UROLOGY

## 2022-04-28 PROCEDURE — 1160F RVW MEDS BY RX/DR IN RCRD: CPT | Performed by: UROLOGY

## 2022-04-28 PROCEDURE — 3075F SYST BP GE 130 - 139MM HG: CPT | Performed by: UROLOGY

## 2022-04-28 NOTE — PROGRESS NOTES
HISTORY:    FU bladder cancer  Based on note of September 2021, he underwent TUR of a bladder cancer in June 2021 at [facility], the lesion was high-grade bladder cancer, but apparently did not follow up for treatment  Also has history of lung cancer and liver cancer, sees [facility] urology Dr Selena English, last visit in around November 2021  With close questioning, patient is unaware of any these details, he has numerous medical problems which are clearly outlined in the chart  No current urinary complaints except for chronic frequency and nocturia times 3  No gross hematuria or burning  ASSESSMENT / PLAN:    Difficult situation with untreated bladder cancer, and two other aggressive cancers, for which he has been on palliative care     I will contact Dr Selena English and let him know patient had an appointment today  I am not sure if there is any benefit to patient of starting evaluation and treatment of bladder cancer, in light of his other problems and  noncompliance  We will make it appointment for 2-3 months just to not lose track of the patient, and decide on further course of care    The following portions of the patient's history were reviewed and updated as appropriate: allergies, current medications, past family history, past medical history, past social history, past surgical history and problem list     Review of Systems   All other systems reviewed and are negative  Extensive hospital records and imaging reviewed  Objective:     Physical Exam  Constitutional:       Appearance: He is ill-appearing  Comments: Not well oriented, does not recall any details of his treatment  Genitourinary:     Comments: Penis testes some atrophy     Prostate minimally enlarged no nodules  Neurological:      Mental Status: He is alert             No results found for: PSA]  BUN   Date Value Ref Range Status   06/30/2017 11 7 - 25 mg/dL Final     Creatinine   Date Value Ref Range Status   06/30/2017 1  01 0 70 - 1 18 mg/dL Final     Comment:     For patients >52years of age, the reference limit  for Creatinine is approximately 13% higher for people  identified as -American            No components found for: CBC      Patient Active Problem List   Diagnosis    Tremor, essential    Other specified polyneuropathies    [facility]    Chronic pansinusitis    Alcoholic cirrhosis of liver (HCC)    COPD (chronic obstructive pulmonary disease) (HCC)    Gout    Hepatocellular carcinoma (HCC)    Hypertension    LIANA (obstructive sleep apnea)    Pulmonary nodule    Contracture of palmar fascia    Constipation    Diverticulitis    Gastroesophageal reflux disease    ETOH abuse    Impaired fasting glucose    Lyme disease    Tremor    Tobacco dependence syndrome    History of prostate cancer    Thyroid nodule    Synovial cyst of right popliteal space    Hyperplasia of prostate    Ventral hernia without obstruction or gangrene    Diverticulosis    Encounter for well adult exam with abnormal findings    Leucopenia    Spondylosis of lumbar region without myelopathy or radiculopathy    Thrombocytopenia (HCC)    Mild episode of recurrent major depressive disorder [facility]    Medicare annual wellness visit, subsequent    Malignant neoplasm of upper lobe of right lung ([facility] )    Gross hematuria    Non compliance w medication regimen    Urothelial carcinoma of bladder ([facility] )    Severe protein-energy malnutrition ([facility] )    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 ([facility] )    Malignant neoplasm of lower lobe of right lung (HCC)    Hematuria    Alcohol dependence, daily use ([facility] )    Seborrheic dermatitis of scalp    Tobacco abuse    Fall    Gait abnormality    Hypothyroidism due to medication        Diagnoses and all orders for this visit:    Urothelial carcinoma of bladder ([facility] )  -     POCT urine dip auto non-scope    Metastatic malignant neoplasm, unspecified site (HCC)  -     POCT urine dip auto non-scope           Patient ID: Lynnette Serrano is a [de-identified] y o  male  Current Outpatient Medications:     albuterol (PROVENTIL HFA,VENTOLIN HFA) 90 mcg/act inhaler, INHALE 2 PUFFS BY MOUTH EVERY 6 HOURS AS NEEDED FOR WHEEZING, Disp: 18 g, Rfl: 2    losartan (COZAAR) 50 mg tablet, Take 1 tablet (50 mg total) by mouth daily, Disp: 30 tablet, Rfl: 10    budesonide-formoterol (Symbicort) 160-4 5 mcg/act inhaler, Inhale 2 puffs 2 (two) times a day Rinse mouth after use  (Patient not taking: Reported on 4/28/2022 ), Disp: 10 2 g, Rfl: 2    Cholecalciferol 1000 units CHEW, Chew 5,000 Units daily   (Patient not taking: Reported on 4/28/2022 ), Disp: , Rfl:     hydrOXYzine HCL (ATARAX) 25 mg tablet, , Disp: , Rfl:     oxybutynin (DITROPAN-XL) 5 mg 24 hr tablet, Take 1 tablet (5 mg total) by mouth daily (Patient not taking: Reported on 4/28/2022 ), Disp: 30 tablet, Rfl: 2    sertraline (ZOLOFT) 100 mg tablet, Take 1 tablet (100 mg total) by mouth daily (Patient not taking: Reported on 4/28/2022 ), Disp: 30 tablet, Rfl: 2    Past Medical History:   Diagnosis Date    Anemia     dur to blood loss in the past    BMI 25 0-25 9,adult 5/24/2019    BPH (benign prostatic hyperplasia) 2002    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     Cancer ([facility] )     hepatocellular, prostate    Chronic bronchitis (HCC)     Chronic bronchitis ([facility] ) 7/19/2016    Last Assessment & Plan:  He has symptoms of chronic bronchitis  He would of course benefit from smoking cessation  I have recommended a trial of Spiriva      Colitis     COPD (chronic obstructive pulmonary disease) (HCC)     chronic bronchitis    Decubitus ulcer of sacral region, stage 3 (HCC) 3/12/2018    Diverticulitis     Essential tremor     ETOH abuse     GERD (gastroesophageal reflux disease)     Gout     Hearing loss     Hemorrhoids     History of Lyme disease     History of thrombocytopenia     chronic mild    History of transfusion     HTN (hypertension)     Hydrocele  Hyperlipidemia     Hypertension     Hyperthyroidism     Irritable bowel syndrome     Liver disease     cirrhosis    Lyme disease     Meningioma (HCC)     Meningioma (HCC)     left frontal- followed by Neurosurgery    Nicotine abuse     2-3 packs    Obstructive sleep apnea     unable to tolerate CPAP    Overweight with body mass index (BMI) of 25 to 25 9 in adult 11/3/2021    Pressure injury of skin     Stage 3 that healed and has reoccurred    Pulmonary nodule     Sensory polyneuropathy     Sleep apnea     Spondylosis of lumbar region without myelopathy or radiculopathy 10/25/2019    Thyroid nodule     Tobacco abuse     Tremor        Past Surgical History:   Procedure Laterality Date    APPENDECTOMY      CATARACT EXTRACTION      debra    ESOPHAGOGASTRODUODENOSCOPY      HAND SURGERY  04/30/2018    LIVER SURGERY  03/09/2018    LA REMV PILONIDAL LESION SIMPLE N/A 9/28/2018    Procedure: PILONIDAL CYSTECTOMY;  Surgeon: Mikki Guillen DO;  Location: [address] [place] [address];  Service: Robert Segal THYROID SURGERY      TONSILLECTOMY         Social History

## 2022-06-14 ENCOUNTER — TELEPHONE (OUTPATIENT)
Dept: FAMILY MEDICINE CLINIC | Facility: CLINIC | Age: 82
End: 2022-06-14

## 2022-06-14 NOTE — PROGRESS NOTES
[facility]  Classie Del, PharmD, [place], [place]    Reason for documentation: Patient was flagged by Third Party Payer and/or internal report as being due for a refill on the following medications: losartan    Recommendation: Encouraged patient to pair medication taking with routine daily activity such as brushing teeth or eating meal to help with adherence  The following actions were taken today by the Clinical Pharmacist:   · Contacted patient's preferred pharmacy and provided verbal refills as authorized by prescription written on 3/25/2022  90-day supply with RF: 3  Findings:  LIZ -   Prescription name: losartan 50 mg tab  Fill rate at pharmacy: 62% (goal >80%)     Discussion:  Discussed medication adherence with patient and methods to improve adherence       Adherence Assessment:   Misses doses:  yes  # of missed doses in the past week: 3-4    # of times per day patient takes meds: 1    Use of supportive adherence tools:No    Med cost concerns: no    Reason For [employer] Pharmacist    Demographics  Interaction Method: Phone  Type of Intervention: New    Topic(s) Addressed  Quality measures    Intervention(s) Made      Non-Pharmacologic:    -- Adherence addressed  -- Care coordination    Tool(s) Used  Payer Portal    Time Spent:   Time Spent in Direct Patient Care: 10 minutes  Time Spent in Care Coordination: 10 minutes    Recommendations  Recipient: Patient/caregiver  Outcome: Education Provided

## 2022-07-26 ENCOUNTER — OFFICE VISIT (OUTPATIENT)
Dept: FAMILY MEDICINE CLINIC | Facility: CLINIC | Age: 82
End: 2022-07-26
Payer: COMMERCIAL

## 2022-07-26 VITALS
HEIGHT: 68 IN | SYSTOLIC BLOOD PRESSURE: 130 MMHG | WEIGHT: 138 LBS | HEART RATE: 72 BPM | OXYGEN SATURATION: 100 % | BODY MASS INDEX: 20.92 KG/M2 | DIASTOLIC BLOOD PRESSURE: 80 MMHG | TEMPERATURE: 97.1 F

## 2022-07-26 DIAGNOSIS — S32.028A OTHER CLOSED FRACTURE OF SECOND LUMBAR VERTEBRA, INITIAL ENCOUNTER (HCC): ICD-10-CM

## 2022-07-26 DIAGNOSIS — F10.20 ALCOHOL DEPENDENCE, DAILY USE (HCC): ICD-10-CM

## 2022-07-26 DIAGNOSIS — R26.9 GAIT ABNORMALITY: ICD-10-CM
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
## 2022-07-26 DIAGNOSIS — S01.81XD LACERATION OF FOREHEAD WITHOUT COMPLICATION, SUBSEQUENT ENCOUNTER: ICD-10-CM

## 2022-07-26 PROCEDURE — 1100F PTFALLS ASSESS-DOCD GE2>/YR: CPT | Performed by: FAMILY MEDICINE

## 2022-07-26 PROCEDURE — 99214 OFFICE O/P EST MOD 30 MIN: CPT | Performed by: FAMILY MEDICINE

## 2022-07-26 RX ORDER — CEPHALEXIN 500 MG/1
500 CAPSULE ORAL 4 TIMES DAILY
COMMUNITY
Start: 2022-07-24 | End: 2022-07-31

## 2022-07-27 ENCOUNTER — RA CDI HCC (OUTPATIENT)
Dept: OTHER | Facility: HOSPITAL | Age: 82
End: 2022-07-27

## 2022-07-27 NOTE — PROGRESS NOTES
[facility]  coding opportunities       Chart reviewed, no opportunity found:   [address]        Patients Insurance     Medicare Insurance: [employer] Advantage

## 2022-07-28 PROBLEM — S01.81XA LACERATION OF FOREHEAD WITHOUT COMPLICATION: Status: ACTIVE | Noted: 2022-07-28

## 2022-07-28 PROBLEM — S32.029A CLOSED FRACTURE OF SECOND LUMBAR VERTEBRA (HCC): Status: ACTIVE | Noted: 2022-07-26

## 2022-07-28 PROBLEM — S32.029A CLOSED FRACTURE OF SECOND LUMBAR VERTEBRA (HCC): Status: ACTIVE | Noted: 2022-07-28

## 2022-07-28 PROBLEM — S01.81XA LACERATION OF FOREHEAD WITHOUT COMPLICATION: Status: ACTIVE | Noted: 2022-07-26

## 2022-07-28 NOTE — PROGRESS NOTES
Falls Plan of Care: balance, strength, and gait training instructions were provided  Subjective:   Chief Complaint   Patient presents with    Ambulatory Case Management     S/P ER        Patient ID: Soraya Bautista is a [de-identified] y o  male  Patient here status post ER visit status post fall he had the laceration on his right eyebrow for what he did have 2 stitches in place the patient had the history of the alcohol use and he been drinking daily and he does drink hard liquor he had a gait dysfunction he had tremor I review the ER record including result of the chest x-ray CT scan of the neck CT scan of the abdomen and pelvic   The laceration is healing a no discharge no pain  Finding on the CT scan of the abdomen and pelvic technique in the wall of the bladder patient known to have history of bladder cancer he F/up  by Urology last appoitment 04/22 note eview      The following portions of the patient's history were reviewed and updated as appropriate: allergies, current medications, past family history, past medical history, past social history, past surgical history and problem list     Review of Systems   Constitutional: Positive for fatigue  Negative for fever  HENT: Negative for congestion, ear pain, sinus pressure, sinus pain and sore throat  Eyes: Negative for pain, discharge, redness and itching  Respiratory: Negative for cough, chest tightness, shortness of breath and stridor  Cardiovascular: Negative for chest pain, palpitations and leg swelling  Gastrointestinal: Negative for abdominal pain, blood in stool, constipation, diarrhea and nausea  Genitourinary: Negative for dysuria, flank pain, frequency and hematuria  Musculoskeletal: Positive for joint swelling  Neurological: Negative for dizziness, tremors, weakness, numbness and headaches               Objective:  Vitals:    07/26/22 1302   BP: 130/80   Pulse: 72   Temp: (!) 97 1 °F (36 2 °C)   TempSrc: Tympanic   SpO2: 100% Weight: 62 6 kg (138 lb)   Height: 5' 7 5" (1 715 m)      Physical Exam  Vitals and nursing note reviewed  Constitutional:       General: He is not in acute distress  Appearance: He is well-developed  He is not diaphoretic  HENT:      Head: Normocephalic  Right Ear: External ear normal       Left Ear: External ear normal    Eyes:      General:         Right eye: No discharge  Left eye: No discharge  Conjunctiva/sclera: Conjunctivae normal    Neck:      Vascular: No JVD  Cardiovascular:      Rate and Rhythm: Normal rate and regular rhythm  Heart sounds: Normal heart sounds  No murmur heard  No gallop  Pulmonary:      Effort: Pulmonary effort is normal  No respiratory distress  Breath sounds: Normal breath sounds  No stridor  No wheezing or rales  Chest:      Chest wall: No tenderness  Abdominal:      General: There is no distension  Palpations: Abdomen is soft  There is no mass  Tenderness: There is no abdominal tenderness  There is no rebound  Musculoskeletal:         General: No tenderness  Cervical back: Normal range of motion and neck supple  Lymphadenopathy:      Cervical: No cervical adenopathy  Skin:     General: Skin is warm  Findings: No erythema or rash  Neurological:      Mental Status: He is alert and oriented to person, place, and time        Gait: Gait abnormal            Assessment/Plan:    Laceration of forehead without complication  S/P fall status post ER visit the laceration on the right eyebrow with 2 stitches in place healing no drainage no swelling or tender the we will schedule appointment on Friday to move the stitches    Fall  A status post recent fall patient has a history of abnormal gait history of the alcohol abuse had the imbalance in his gait he does not use his cane all the time discussed with the patient important stop drinking recommend the to refer to physical therapy for gait the evaluation and treatment he decline recommend to use a cane all the time    Gait abnormality  Chronic with recent fall patient declined referral for physical therapy for gait evaluation and treatment recommend the use a cane fall precaution review    Alcohol dependence, daily use (HCC)  Chronic patient drink daily a 2-3 times the a day and he does drink hard liquor patient had history of the liver cancer and he had the abnormal gait and recurrent fall he is not willing to stop drinking    Closed fracture of second lumbar vertebra (HCC)  Incidental finding on the CT scan of the abdomen pelvic during ER visit secondary to fall there is a subacute healing fracture of the left L2-L4 transverse process as well as the L2 and L3 spinous process a discussed with the patient recommend to follow up with the orthopedic and he decline       Diagnoses and all orders for this visit:    Fall, subsequent encounter    Laceration of forehead without complication, subsequent encounter    Gait abnormality    Alcohol dependence, daily use ([address] )    Other closed fracture of second lumbar vertebra, initial encounter ([address] )    Other orders  -     cephalexin (KEFLEX) 500 mg capsule;  Take 500 mg by mouth 4 (four) times a day

## 2022-07-28 NOTE — ASSESSMENT & PLAN NOTE
S/P fall status post ER visit the laceration on the right eyebrow with 2 stitches in place healing no drainage no swelling or tender the we will schedule appointment on Friday to move the stitches

## 2022-07-28 NOTE — ASSESSMENT & PLAN NOTE
Chronic smoker with history of multiple canceraware of the complication of smoking not ready to quit smoking yet and decline a new medication to help him to stop smoking

## 2022-07-28 NOTE — ASSESSMENT & PLAN NOTE
Incidental finding on the CT scan of the abdomen pelvic during ER visit secondary to fall there is a subacute healing fracture of the left L2-L4 transverse process as well as the L2 and L3 spinous process a discussed with the patient recommend to follow up with the orthopedic and he decline

## 2022-07-28 NOTE — ASSESSMENT & PLAN NOTE
A status post recent fall patient has a history of abnormal gait history of the alcohol abuse had the imbalance in his gait he does not use his cane all the time discussed with the patient important stop drinking recommend the to refer to physical therapy for gait the evaluation and treatment he decline recommend to use a cane all the time

## 2022-07-28 NOTE — ASSESSMENT & PLAN NOTE
Chronic with recent fall patient declined referral for physical therapy for gait evaluation and treatment recommend the use a cane fall precaution review

## 2022-07-28 NOTE — ASSESSMENT & PLAN NOTE
Chronic patient drink daily a 2-3 times the a day and he does drink hard liquor patient had history of the liver cancer and he had the abnormal gait and recurrent fall he is not willing to stop drinking

## 2022-07-29 ENCOUNTER — OFFICE VISIT (OUTPATIENT)
Dept: FAMILY MEDICINE CLINIC | Facility: CLINIC | Age: 82
End: 2022-07-29
Payer: COMMERCIAL

## 2022-07-29 VITALS
WEIGHT: 141.2 LBS | OXYGEN SATURATION: 94 % | DIASTOLIC BLOOD PRESSURE: 80 MMHG | HEART RATE: 68 BPM | RESPIRATION RATE: 14 BRPM | SYSTOLIC BLOOD PRESSURE: 142 MMHG | TEMPERATURE: 97.8 F | BODY MASS INDEX: 21.4 KG/M2 | HEIGHT: 68 IN

## 2022-07-29 DIAGNOSIS — I10 PRIMARY HYPERTENSION: ICD-10-CM

## 2022-07-29 DIAGNOSIS — S01.81XD LACERATION OF FOREHEAD WITHOUT COMPLICATION, SUBSEQUENT ENCOUNTER: ICD-10-CM

## 2022-07-29 DIAGNOSIS — F17.200 TOBACCO DEPENDENCE SYNDROME: ICD-10-CM

## 2022-07-29 DIAGNOSIS — S32.028D OTHER CLOSED FRACTURE OF SECOND LUMBAR VERTEBRA WITH ROUTINE HEALING, SUBSEQUENT ENCOUNTER: Primary | ICD-10-CM

## 2022-07-29 DIAGNOSIS — K59.09 OTHER CONSTIPATION: ICD-10-CM

## 2022-07-29 PROCEDURE — 99214 OFFICE O/P EST MOD 30 MIN: CPT | Performed by: FAMILY MEDICINE

## 2022-07-29 NOTE — PROGRESS NOTES
Tobacco Cessation Counseling: Tobacco cessation counseling was provided  The patient is sincerely urged to quit consumption of tobacco  He is not ready to quit tobacco        Subjective:   Chief Complaint   Patient presents with    Follow-up     ED 7/23/22 Fall Injury - 2 stitches removal- upper Right Eye        Patient ID: Ike Scanlon is a [de-identified] y o  male  Patient here for follow-up the patient the had the fall July 23rd was in the emergency room and had the 2 stitches the above the right eyebrow and he is here to removed the stitches patient deny any swelling pain no discharge a discussed the patient's history of fracture history of alcohol and smoking multiple risk factor for osteoporosis discussed screening and and he agree and patient history of constipation deny any blood in the stool no pain with defecation no abdomen distension patient continued to smoke      The following portions of the patient's history were reviewed and updated as appropriate: allergies, current medications, past family history, past medical history, past social history, past surgical history and problem list     Review of Systems   Constitutional: Negative for activity change and fever  HENT: Negative for congestion, ear pain, sinus pressure, sinus pain and sore throat  Eyes: Negative for pain, discharge, redness and itching  Respiratory: Negative for cough, chest tightness, shortness of breath and stridor  Cardiovascular: Negative for chest pain, palpitations and leg swelling  Gastrointestinal: Negative for abdominal pain, blood in stool, constipation, diarrhea and nausea  Genitourinary: Negative for dysuria, flank pain, frequency and hematuria  Skin: Negative for pallor and rash  Laceration on right eyebrow   Neurological: Negative for dizziness, weakness and headaches               Objective:  Vitals:    07/29/22 1306   BP: 142/80   BP Location: Left arm   Patient Position: Sitting   Cuff Size: Standard Pulse: 68   Resp: 14   Temp: 97 8 °F (36 6 °C)   TempSrc: Tympanic   SpO2: 94%   Weight: 64 kg (141 lb 3 2 oz)   Height: 5' 7 5" (1 715 m)      Physical Exam  Constitutional:       General: He is not in acute distress  Appearance: He is well-developed  He is not diaphoretic  HENT:      Head: Normocephalic  Right Ear: External ear normal       Left Ear: External ear normal    Eyes:      General:         Right eye: No discharge  Left eye: No discharge  Conjunctiva/sclera: Conjunctivae normal    Neck:      Vascular: No JVD  Cardiovascular:      Rate and Rhythm: Normal rate and regular rhythm  Heart sounds: Murmur heard  No gallop  Pulmonary:      Effort: Pulmonary effort is normal       Breath sounds: Normal breath sounds  Abdominal:      General: There is no distension  Palpations: Abdomen is soft  There is no mass  Tenderness: There is no abdominal tenderness  There is no rebound  Musculoskeletal:         General: No tenderness  Cervical back: Normal range of motion and neck supple  Lymphadenopathy:      Cervical: No cervical adenopathy  Skin:     General: Skin is warm  Neurological:      Mental Status: He is alert and oriented to person, place, and time        Gait: Gait abnormal            Assessment/Plan:    Laceration of forehead without complication  Laceration status post fall with the to stitches in place patient here to move it and we sterilized the area and we open suture kit and remove it to stitches without any bleeding patient tolerated well without side effect  apply topical antibiotic    Hypertension  Chronic asymptomatic blood pressure 142/80 discussed the patient history of systolic is slightly above normal will hold on adjusting medication continue current medication low-salt diet discussed the patient    Closed fracture of second lumbar vertebra ([address] )  A male with history of alcohol use and the he had vertebrae fracture status with recent the fall he has the consider at risk for osteoporosis discussed the patient screening with DEXA scan he agree will order today    Constipation  Chronic asymptomatic discussed with the patient important increase fiber increased physical activity and fluid intake    Tobacco dependence syndrome  Chronic uncontrolled patient aware of the complication of smoking he is not willing to quit smoking yet patient had history of lung cancer and he did not follow with the Pulmonary for long time    Suture removal    Date/Time: 7/29/2022 2:05 PM  Performed by: Maryanne Phillips MD  Authorized by: Maryanne Phillips MD   Universal Protocol:  Consent: Verbal consent obtained  Consent given by: patient  Patient understanding: patient states understanding of the procedure being performed  Patient identity confirmed: verbally with patient        Patient location:  Clinic  Location:     Location:  [address] [facility] location:  Forehead  Procedure details: Tools used:  Suture removal kit    Wound appearance:  No sign(s) of infection, good wound healing and clean    Number of sutures removed:  2  Post-procedure details:     Post-removal:  Antibiotic ointment applied    Patient tolerance of procedure: Tolerated well, no immediate complications       Diagnoses and all orders for this visit:    Other closed fracture of second lumbar vertebra with routine healing, subsequent encounter  -     DXA bone density spine hip and pelvis;  Future    Laceration of forehead without complication, subsequent encounter  -     Suture removal    Primary hypertension    Other constipation    Tobacco dependence syndrome

## 2022-08-02 NOTE — ASSESSMENT & PLAN NOTE
Chronic asymptomatic blood pressure 142/80 discussed the patient history of systolic is slightly above normal will hold on adjusting medication continue current medication low-salt diet discussed the patient

## 2022-08-02 NOTE — ASSESSMENT & PLAN NOTE
Laceration status post fall with the to stitches in place patient here to move it and we sterilized the area and we open suture kit and remove it to stitches without any bleeding patient tolerated well without side effect  apply topical antibiotic

## 2022-08-02 NOTE — ASSESSMENT & PLAN NOTE
Chronic asymptomatic discussed with the patient important increase fiber increased physical activity and fluid intake

## 2022-08-02 NOTE — ASSESSMENT & PLAN NOTE
Chronic uncontrolled patient aware of the complication of smoking he is not willing to quit smoking yet patient had history of lung cancer and he did not follow with the Pulmonary for long time

## 2022-08-02 NOTE — ASSESSMENT & PLAN NOTE
A male with history of alcohol use and the he had vertebrae fracture status with recent the fall he has the consider at risk for osteoporosis discussed the patient screening with DEXA scan he agree will order today

## 2022-09-20 ENCOUNTER — TRANSITIONAL CARE MANAGEMENT (OUTPATIENT)
Dept: FAMILY MEDICINE CLINIC | Facility: CLINIC | Age: 82
End: 2022-09-20

## 2022-09-20 ENCOUNTER — NURSING HOME VISIT (OUTPATIENT)
Dept: WOUND CARE | Facility: HOSPITAL | Age: 82
End: 2022-09-20
Payer: COMMERCIAL

## 2022-09-20 DIAGNOSIS — U07.1 COVID: ICD-10-CM

## 2022-09-20 DIAGNOSIS — L89.153 PRESSURE INJURY OF SACRAL REGION, STAGE 3 (HCC): Primary | ICD-10-CM

## 2022-09-20 DIAGNOSIS — T14.8XXA SURGICAL WOUND PRESENT: ICD-10-CM

## 2022-09-20 PROCEDURE — 99305 1ST NF CARE MODERATE MDM 35: CPT | Performed by: NURSE PRACTITIONER

## 2022-09-21 NOTE — PROGRESS NOTES
[address] [facility]   [facility]       Patient ID: Meri Sanchez is a [de-identified] y o  male Date of Birth 1940     Location of Service: [place] [address]    Performed wound round with: Wound team     Chief Complaint : Coccys, suprapubic, left lower leg    Wound Instructions:  Wound:  Coccyx  Discontinue previous wound order  Cleanse the wound bed with NSS   Apply non-sting skin prep to periwound area  Apply Triad paste to wound bed  Frequency : twice a day and prn for soiling    Wound : Left suprapubic and lower leg surgical incision  Cleanse with NSS  Apply skin prep to periwound  Apply silver alginate to wound bed and cover with ABD pad and wrap with kerlix  Daily and prn for soiling    Wound : Right suprapubic area  Cleanse with NSS  Apply DSD to wound bed  Daily and prn for soiling    Offload all wounds  Turn and reposition frequently  Increase protein intake  Monitor for any sign of infection or worsening, inform PCP or patient's primary physician in your facility  Allergies  Pneumococcal vaccine      Assessment & Plan:  1  Pressure injury of sacral region, stage 3 ([facility])  Assessment & Plan:  Sacrum / Coccyx  - this is a chronic wound  - 100% granulation, with no obvious sign of infection  - local wound care with Triad paste   - continue to offload  - followup next week        2  Surgical wound present  Assessment & Plan:  History :  - LEFT FEMORAL ARTERY THROMBECTOMY, LEFT LOWER EXTREMITY THROMBOEMBOLECTOMY AND FASCIOTOMIES, RIGHT TO LEFT FEMORAL-FEMORAL BYPASS on 9/11/2022     - the wound on the left lower leg is 100% granulation, with no obvious sign of infection  - the surgical incision on on the left suprapubic area is sutured,  red, (-) warm, nurse supervisor was made aware  - the surgical incision on the right suprapubic area is sutured, intact, no redness  - local wound care - silver alginate on the left lower leg  - continue to offload  - continue to followup with surgeon  - followup next week      3  COVID  Assessment & Plan: On isolation, under the care of geriatric team             Subjective:   9/20/2022This is a [de-identified] y o , male referred to our service for wound/ skin alterations on coccyx and left lower leg  Patient have a complex medical history including but not limited to  Cancer [facility]), COPD (chronic obstructive pulmonary disease) ([facility] ), Decubitus ulcer of sacral region, stage 3 ([facility] ), Diverticulitis, ETOH abuse and Nicotine abuse   Patient was referred by Senior Care Team  Patient was seen in collaboration with the facility wound team      Wound History:   As per medical record review, patient had critical limb ischemia and had LEFT FEMORAL ARTERY THROMBECTOMY, LEFT LOWER EXTREMITY THROMBOEMBOLECTOMY AND FASCIOTOMIES, RIGHT TO LEFT FEMORAL-FEMORAL BYPASS on 9/11/2022  The wound on the coccyx is chronic  Received patient in bed, seems comfortable  Denies pain  Current treatment on wound is dry sterile dressing  Patient currently on isoloation for COVID  Review of Systems   Constitutional: Negative  HENT: Negative  Eyes: Negative  Respiratory: Negative  Cardiovascular: Negative  Gastrointestinal: Negative  Endocrine: Negative  Genitourinary: Negative  Musculoskeletal: Positive for gait problem  Skin: Positive for wound  See HPI   Hematological: Negative  Psychiatric/Behavioral: Negative  All other systems reviewed and are negative  Objective:    Physical Exam  Constitutional:       Appearance: Normal appearance  HENT:      Head: Normocephalic and atraumatic  Nose: Nose normal       Mouth/Throat:      Mouth: Mucous membranes are moist    Eyes:      Conjunctiva/sclera: Conjunctivae normal    Pulmonary:      Effort: Pulmonary effort is normal    Abdominal:      Tenderness: There is no abdominal tenderness  Genitourinary:     Comments: continent  Musculoskeletal:      Cervical back: Normal range of motion  Comments: LROM   Skin:     Findings: Lesion present  Comments: Coccyx - wound size is 1 x 0 5 x 0 5 cm , 100% granulation, periwound is normal, no drainage, no obvious sign of infection no malodor    Right suprapubic - incision is sutured, intact, no redness, no drainage    Left suprapubic area - incision is sutured, intact, with redness, small amount of serous drainage, no malodor    Left lower leg medial - wound size is 12 x 2 5 x 0 1 cm  , 100% granulation, moderate amount of serous drainage, no malodor, no obvious sign of infection    Left lower leg lateral - wound size is 9 5 x 3 x 0 1 cm  , 100% granulation, moderate amount of serous drainage, no malodor, no obvious sign of infection       Neurological:      Mental Status: He is alert  Gait: Gait abnormal    Psychiatric:         Mood and Affect: Mood normal          Behavior: Behavior normal               Procedures           Patient's care was coordinated with nursing facility staff  Recent vitals, labs and updated medications were reviewed on EMR or chart system of facility  Past Medical, surgical, social, medication and allergy history and patient's previous records were reviewed and updated as appropriate: Most up-to date information is available in the facility EMR where the patient is currently admitted      Patient Active Problem List   Diagnosis    Tremor, essential    Other specified polyneuropathies    Malignant neoplasm of prostate ([facility] [address] )    Chronic pansinusitis    Alcoholic cirrhosis of liver (HCC)    COPD (chronic obstructive pulmonary disease) (HCC)    Gout    Hepatocellular carcinoma (HCC)    Hypertension    LIANA (obstructive sleep apnea)    Pulmonary nodule    Contracture of palmar fascia    Constipation    Diverticulitis    Gastroesophageal reflux disease    Impaired fasting glucose    Lyme disease    Tremor    Tobacco dependence syndrome    History of prostate cancer    Thyroid nodule    Synovial cyst of right popliteal space    Hyperplasia of prostate    Ventral hernia without obstruction or gangrene    Diverticulosis    Encounter for well adult exam with abnormal findings    Leucopenia    Spondylosis of lumbar region without myelopathy or radiculopathy    Thrombocytopenia (HCC)    [facility] of [facility]    Medicare annual wellness visit, subsequent    Malignant neoplasm of upper lobe of right lung ([facility] )    Gross hematuria    Non compliance w medication regimen    [facility]rothelial [facility] of [facility]  )    Severe protein-energy malnutrition (HCC)    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 ([facility]    Malignant neoplasm of lower lobe of right lung (HCC)    Hematuria    Alcohol dependence, daily use ([place] [address] )    Seborrheic dermatitis of scalp    Tobacco abuse    Fall    Gait abnormality    Hypothyroidism due to medication    Laceration of forehead without complication    Closed fracture of second lumbar vertebra (HCC)    Pressure injury of sacral region, stage 3 ([place] [address] )    Surgical wound present    COVID     Past Medical History:   Diagnosis Date    Anemia     dur to blood loss in the past    BMI 25 0-25 9,adult 5/24/2019    BPH (benign prostatic hyperplasia) 2002    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[place] [address] )     Cancer ([place] [address] )     hepatocellular, prostate    Chronic bronchitis (HCC)     Chronic bronchitis ([place] [address] ) 7/19/2016    Last Assessment & Plan:  He has symptoms of chronic bronchitis  He would of course benefit from smoking cessation  I have recommended a trial of Spiriva      Colitis     COPD (chronic obstructive pulmonary disease) (HCC)     chronic bronchitis    Decubitus ulcer of sacral region, stage 3 (HCC) 3/12/2018    Diverticulitis     Essential tremor     ETOH abuse     GERD (gastroesophageal reflux disease)     Gout     Hearing loss     Hemorrhoids     History of Lyme disease     History of thrombocytopenia     chronic mild    History of transfusion     HTN (hypertension)     Hydrocele     Hyperlipidemia     Hypertension     Hyperthyroidism     Irritable bowel syndrome     Liver disease     cirrhosis    Lyme disease     Meningioma (HCC)     Meningioma (HCC)     left frontal- followed by Neurosurgery    Nicotine abuse     2-3 packs    Obstructive sleep apnea     unable to tolerate CPAP    Overweight with body mass index (BMI) of 25 to 25 9 in adult 11/3/2021    Pressure injury of skin     Stage 3 that healed and has reoccurred    Pulmonary nodule     Sensory polyneuropathy     Sleep apnea     Spondylosis of lumbar region without myelopathy or radiculopathy 10/25/2019    Thyroid nodule     Tobacco abuse     Tremor      Past Surgical History:   Procedure Laterality Date    APPENDECTOMY      CATARACT EXTRACTION      debra    ESOPHAGOGASTRODUODENOSCOPY      HAND SURGERY  04/30/2018    LIVER SURGERY  03/09/2018    ID REMV PILONIDAL LESION SIMPLE N/A 9/28/2018    Procedure: PILONIDAL CYSTECTOMY;  Surgeon: Carol Jimneez DO;  Location:  MAIN OR;  Service: Joshua Mcmullen THYROID SURGERY      TONSILLECTOMY       Social History     Socioeconomic History    Marital status:       Spouse name: None    Number of children: None    Years of education: None    Highest education level: None   Occupational History    Occupation:    Tobacco Use    Smoking status: Current Every Day Smoker     Packs/day: 1 00     Types: Cigarettes     Start date: 3/25/1961    Smokeless tobacco: Never Used    Tobacco comment: hx of nicotine abuse 2-3 packs   Vaping Use    Vaping Use: Never used   Substance and Sexual Activity    Alcohol use: Yes     Comment: 2-3 oz daily     Drug use: No    Sexual activity: Not Currently     Partners: Female   Other Topics Concern    None   Social History Narrative    Hx of caffeine abuse    Hx of ETOH abuse     Social Determinants of Health     Financial Resource Strain: Not on file   Food Insecurity: Not on file   Transportation Needs: Not on file   Physical Activity: Not on file   Stress: Not on file   Social Connections: Not on file   Intimate Partner Violence: Not on file   Housing Stability: Not on file        Current Outpatient Medications:     albuterol (PROVENTIL HFA,VENTOLIN HFA) 90 mcg/act inhaler, INHALE 2 PUFFS BY MOUTH EVERY 6 HOURS AS NEEDED FOR WHEEZING, Disp: 18 g, Rfl: 2    budesonide-formoterol (Symbicort) 160-4 5 mcg/act inhaler, Inhale 2 puffs 2 (two) times a day Rinse mouth after use , Disp: 10 2 g, Rfl: 2    Cholecalciferol 1000 units CHEW, Chew 5,000 Units daily, Disp: , Rfl:     losartan (COZAAR) 50 mg tablet, Take 1 tablet (50 mg total) by mouth daily, Disp: 30 tablet, Rfl: 10  Family History   Problem Relation Age of Onset    Diabetes Father     Other Father         colon problems/colostomy    Alcohol abuse Sister               Coordination of Care: Wound team aware of the treatment plan  Facility nurse will provide wound treatment and monitor the wound for any changes  Patient / Staff education : Patient / Staff was given education on sign of infection and pressure ulcer prevention  Patient/ Staff verbalized understanding     Follow up :  Next week    Voice-recognition software may have been used in the preparation of this document  Occasional wrong word or "sound-alike" substitutions may have occurred due to the inherent limitations of voice recognition software  Interpretation should be guided by context        BRYANT Shelley

## 2022-09-21 NOTE — ASSESSMENT & PLAN NOTE
History :  - LEFT FEMORAL ARTERY THROMBECTOMY, LEFT LOWER EXTREMITY THROMBOEMBOLECTOMY AND FASCIOTOMIES, RIGHT TO LEFT FEMORAL-FEMORAL BYPASS on 9/11/2022     - the wound on the left lower leg is 100% granulation, with no obvious sign of infection  - the surgical incision on on the left suprapubic area is sutured,  red, (-) warm, nurse supervisor was made aware  - the surgical incision on the right suprapubic area is sutured, intact, no redness  - local wound care - silver alginate on the left lower leg  - continue to offload  - continue to followup with surgeon  - followup next week

## 2022-09-21 NOTE — ASSESSMENT & PLAN NOTE
Mike Luong / Fer  - this is a chronic wound  - 100% granulation, with no obvious sign of infection  - local wound care with Triad paste   - continue to offload  - followup next week

## 2022-09-27 ENCOUNTER — NURSING HOME VISIT (OUTPATIENT)
Dept: WOUND CARE | Facility: HOSPITAL | Age: 82
End: 2022-09-27
Payer: COMMERCIAL

## 2022-09-27 DIAGNOSIS — U07.1 COVID: ICD-10-CM

## 2022-09-27 DIAGNOSIS — L89.153 PRESSURE INJURY OF SACRAL REGION, STAGE 3 (HCC): Primary | ICD-10-CM

## 2022-09-27 DIAGNOSIS — T14.8XXA SURGICAL WOUND PRESENT: ICD-10-CM

## 2022-09-27 PROCEDURE — 99308 SBSQ NF CARE LOW MDM 20: CPT | Performed by: NURSE PRACTITIONER

## 2022-09-27 NOTE — ASSESSMENT & PLAN NOTE
History :  - LEFT FEMORAL ARTERY THROMBECTOMY, LEFT LOWER EXTREMITY THROMBOEMBOLECTOMY AND FASCIOTOMIES, RIGHT TO LEFT FEMORAL-FEMORAL BYPASS on 9/11/2022     - the surgical incision on on the left suprapubic area / groin is sutured,  Increase redness , (+) warm, nurse supervisor to inform geriatric provider  - patient was started on oral antibiotic - bactrim  - local wound care - Triple antibiotic ointment  - continue to offload  - continue to followup with surgeon  - followup next week

## 2022-09-27 NOTE — ASSESSMENT & PLAN NOTE
Ilana Swain / Fer  - this is a chronic wound  - no changes from last week, 100% granulation, with no obvious sign of infection  - local wound care with Triad paste   - continue to offload  - followup next week

## 2022-09-27 NOTE — PROGRESS NOTES
[address] MANAGEMENT   AND [facility]PER[facility]IC [facility]INE [facility]ENTER       Patient ID: Kendra Canada is a [de-identified] y o  male Date of Birth 1940     [facility] of [facility]: [facility]    Performed wound round with: Wound team     Chief Complaint : Coccys, suprapubic, left lower leg    Wound Instructions:  Wound:  Coccyx  Discontinue previous wound order  Cleanse the wound bed with NSS   Apply non-sting skin prep to periwound area  Apply Triad paste to wound bed  Frequency : twice a day and prn for soiling    Wound : Left suprapubic/ groin   Cleanse with NSS  Apply skin prep to periwound  Apply triple antibiotic ointment to wound bed and cover with DSD  Daily and prn for soiling    Offload all wounds  Turn and reposition frequently  Increase protein intake  Monitor for any sign of infection or worsening, inform PCP or patient's primary physician in your facility  Allergies  Pneumococcal vaccine      Assessment & Plan:  1  Pressure injury of sacral region, stage 3 ([facility])  Assessment & Plan:  Sacrum / Coccyx  - this is a chronic wound  - no changes from last week, 100% granulation, with no obvious sign of infection  - local wound care with Triad paste   - continue to offload  - followup next week        2  Surgical wound present  Assessment & Plan:  History :  - LEFT FEMORAL ARTERY THROMBECTOMY, LEFT LOWER EXTREMITY THROMBOEMBOLECTOMY AND FASCIOTOMIES, RIGHT TO LEFT FEMORAL-FEMORAL BYPASS on 9/11/2022  - the surgical incision on on the left suprapubic area / groin is sutured,  Increase redness , (+) warm, nurse supervisor to inform geriatric provider  - patient was started on oral antibiotic - bactrim  - local wound care - Triple antibiotic ointment  - continue to offload  - continue to followup with surgeon  - followup next week      3  COVID  Assessment & Plan:   On isolation, under the care of geriatric team             Subjective:   9/20/2022This is a [de-identified] y o , male referred to our service for wound/ skin alterations on coccyx and left lower leg  Patient have a complex medical history including but not limited to  Cancer [facility]), COPD (chronic obstructive pulmonary disease) ([facility] [address] ), Decubitus ulcer of sacral region, stage 3 ([facility] [address] ), Diverticulitis, ETOH abuse and Nicotine abuse   Patient was referred by Senior Care Team  Patient was seen in collaboration with the facility wound team      Wound History:   As per medical record review, patient had critical limb ischemia and had LEFT FEMORAL ARTERY THROMBECTOMY, LEFT LOWER EXTREMITY THROMBOEMBOLECTOMY AND FASCIOTOMIES, RIGHT TO LEFT FEMORAL-FEMORAL BYPASS on 9/11/2022  The wound on the coccyx is chronic  Received patient in bed, seems comfortable  Denies pain  Current treatment on wound is dry sterile dressing  Patient currently on isoloation for COVID     9/27/2022 Follow up for wound on the buttocks and left groin  Received patient, not in distress  Facility staff did not report any significant issues related to the wound  Denies pain, no fever  Review of Systems   Constitutional: Negative  HENT: Negative  Eyes: Negative  Respiratory: Negative  Cardiovascular: Negative  Gastrointestinal: Negative  Endocrine: Negative  Genitourinary: Negative  Musculoskeletal: Positive for gait problem  Skin: Positive for wound  See HPI   Hematological: Negative  Psychiatric/Behavioral: Negative  All other systems reviewed and are negative  Objective:    Physical Exam  Constitutional:       Appearance: Normal appearance  HENT:      Head: Normocephalic and atraumatic  Nose: Nose normal       Mouth/Throat:      Mouth: Mucous membranes are moist    Eyes:      Conjunctiva/sclera: Conjunctivae normal    Pulmonary:      Effort: Pulmonary effort is normal    Abdominal:      Tenderness: There is no abdominal tenderness  Genitourinary:     Comments: continent  Musculoskeletal:      Cervical back: Normal range of motion  Comments: LROM   Skin:     Findings: Lesion present  Comments: Coccyx - wound size is 1 x 0 5 x 0 5 cm , 100% granulation, periwound is normal, no drainage, no obvious sign of infection no malodor    Right suprapubic - incision is sutured, intact, no redness, no drainage    Left suprapubic area - incision is sutured, intact, with increased redness, small amount of serous drainage, no malodor, (+) warm    Left lower leg medial -cover with dressing  Left lower leg lateral - cover with dressing   Neurological:      Mental Status: He is alert  Gait: Gait abnormal    Psychiatric:         Mood and Affect: Mood normal          Behavior: Behavior normal               Procedures           Patient's care was coordinated with nursing facility staff  Recent vitals, labs and updated medications were reviewed on EMR or chart system of facility  Past Medical, surgical, social, medication and allergy history and patient's previous records were reviewed and updated as appropriate: Most up-to date information is available in the facility EMR where the patient is currently admitted      Patient Active Problem List   Diagnosis    Tremor, essential    Other specified polyneuropathies    Malignant neoplasm of prostate ([facility] [address] )    Chronic pansinusitis    Alcoholic cirrhosis of liver (HCC)    COPD (chronic obstructive pulmonary disease) (HCC)    Gout    Hepatocellular carcinoma (HCC)    Hypertension    LIANA (obstructive sleep apnea)    Pulmonary nodule    Contracture of palmar fascia    Constipation    Diverticulitis    Gastroesophageal reflux disease    Impaired fasting glucose    Lyme disease    Tremor    Tobacco dependence syndrome    History of prostate cancer    Thyroid nodule    Synovial cyst of right popliteal space    Hyperplasia of prostate    Ventral hernia without obstruction or gangrene    Diverticulosis    Encounter for well adult exam with abnormal findings    Leucopenia    Spondylosis of lumbar region without myelopathy or radiculopathy    Thrombocytopenia (HCC)    Mild episode of recurrent major depressive disorder ([facility] [address] )    Medicare annual wellness visit, subsequent    Malignant neoplasm of upper lobe of right lung ([facility] [address] )    Gross hematuria    Non compliance w medication regimen    Urothelial carcinoma of bladder (HCC)    Severe protein-energy malnutrition (HCC)    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)    Malignant neoplasm of lower lobe of right lung (HCC)    Hematuria    [facility]    Seborrheic dermatitis of scalp    Tobacco abuse    Fall    Gait abnormality    Hypothyroidism due to medication    Laceration of forehead without complication    Closed fracture of second lumbar vertebra (HCC)    Pressure injury of sacral region, stage 3 ([facility] [address] )    Surgical wound present    COVID     Past Medical History:   Diagnosis Date    Anemia     dur to blood loss in the past    BMI 25 0-25 9,adult 5/24/2019    BPH (benign prostatic hyperplasia) 2002    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[facility] [address] )     Cancer (HCC)     hepatocellular, prostate    Chronic bronchitis (HCC)     [facility] bronch[facility] 7/19/2016    Last Assessment & Plan:  He has symptoms of chronic bronchitis  He would of course benefit from smoking cessation  I have recommended a trial of Spiriva      Colitis     COPD (chronic obstructive pulmonary disease) (HCC)     chronic bronchitis    Decubitus ulcer of sacral region, stage 3 (HCC) 3/12/2018    Diverticulitis     Essential tremor     ETOH abuse     GERD (gastroesophageal reflux disease)     Gout     Hearing loss     Hemorrhoids     History of Lyme disease     History of thrombocytopenia     chronic mild    History of transfusion     HTN (hypertension)     Hydrocele     Hyperlipidemia     Hypertension     Hyperthyroidism     Irritable bowel syndrome     Liver disease     cirrhosis    Lyme disease     Meningioma (HCC)     Meningioma ([facility] [address] )     left frontal- followed by Neurosurgery    Nicotine abuse     2-3 packs    Obstructive sleep apnea     unable to tolerate CPAP    Overweight with body mass index (BMI) of 25 to 25 9 in adult 11/3/2021    Pressure injury of skin     Stage 3 that healed and has reoccurred    Pulmonary nodule     Sensory polyneuropathy     Sleep apnea     Spondylosis of lumbar region without myelopathy or radiculopathy 10/25/2019    Thyroid nodule     Tobacco abuse     Tremor      Past Surgical History:   Procedure Laterality Date    APPENDECTOMY      CATARACT EXTRACTION      debra    ESOPHAGOGASTRODUODENOSCOPY      HAND SURGERY  04/30/2018    LIVER SURGERY  03/09/2018    HI REMV PILONIDAL LESION SIMPLE N/A 9/28/2018    Procedure: PILONIDAL CYSTECTOMY;  Surgeon: Kendra Swain DO;  Location: [address] [place] [address];  Service: [facility] THYROID SURGERY      TONSILLECTOMY       Social History     Socioeconomic History    Marital status:       Spouse name: None    Number of children: None    Years of education: None    Highest education level: None   Occupational History    Occupation:    Tobacco Use    Smoking status: Current Every Day Smoker     Packs/day: 1 00     Types: Cigarettes     Start date: 3/25/1961    Smokeless tobacco: Never Used    Tobacco comment: hx of nicotine abuse 2-3 packs   Vaping Use    Vaping Use: Never used   Substance and Sexual Activity    Alcohol use: Yes     Comment: 2-3 oz daily     Drug use: No    Sexual activity: Not Currently     Partners: Female   Other Topics Concern    None   Social History Narrative    Hx of caffeine abuse    Hx of ETOH abuse     Social Determinants of Health     Financial Resource Strain: Not on file   Food Insecurity: Not on file   Transportation Needs: Not on file   Physical Activity: Not on file   Stress: Not on file   Social Connections: Not on file   Intimate Partner Violence: Not on file   Housing Stability: Not on file        Current Outpatient Medications:   albuterol (PROVENTIL HFA,VENTOLIN HFA) 90 mcg/act inhaler, INHALE 2 PUFFS BY MOUTH EVERY 6 HOURS AS NEEDED FOR WHEEZING, Disp: 18 g, Rfl: 2    budesonide-formoterol (Symbicort) 160-4 5 mcg/act inhaler, Inhale 2 puffs 2 (two) times a day Rinse mouth after use , Disp: 10 2 g, Rfl: 2    Cholecalciferol 1000 units CHEW, Chew 5,000 Units daily, Disp: , Rfl:     losartan (COZAAR) 50 mg tablet, Take 1 tablet (50 mg total) by mouth daily, Disp: 30 tablet, Rfl: 10  Family History   Problem Relation Age of Onset    Diabetes Father     Other Father         colon problems/colostomy    Alcohol abuse Sister               Coordination of Care: Wound team aware of the treatment plan  Facility nurse will provide wound treatment and monitor the wound for any changes  Patient / Staff education : Patient / Staff was given education on sign of infection and pressure ulcer prevention  Patient/ Staff verbalized understanding     Follow up :  Next week    Voice-recognition software may have been used in the preparation of this document  Occasional wrong word or "sound-alike" substitutions may have occurred due to the inherent limitations of voice recognition software  Interpretation should be guided by context        BRYANT Montenegro

## 2022-10-04 ENCOUNTER — NURSING HOME VISIT (OUTPATIENT)
Dept: WOUND CARE | Facility: HOSPITAL | Age: 82
End: 2022-10-04
Payer: COMMERCIAL

## 2022-10-04 DIAGNOSIS — L89.153 PRESSURE INJURY OF SACRAL REGION, STAGE 3 (HCC): Primary | ICD-10-CM

## 2022-10-04 DIAGNOSIS — T14.8XXA SURGICAL WOUND PRESENT: ICD-10-CM

## 2022-10-04 PROCEDURE — 99308 SBSQ NF CARE LOW MDM 20: CPT | Performed by: NURSE PRACTITIONER

## 2022-10-04 NOTE — ASSESSMENT & PLAN NOTE
Trang Patrick / Fer  - this is a chronic wound  - slight decrease in wound size, 100% granulation, with no obvious sign of infection  - local wound care with Triad paste   - continue to offload  - followup next week

## 2022-10-04 NOTE — ASSESSMENT & PLAN NOTE
History :  - LEFT FEMORAL ARTERY THROMBECTOMY, LEFT LOWER EXTREMITY THROMBOEMBOLECTOMY AND FASCIOTOMIES, RIGHT TO LEFT FEMORAL-FEMORAL BYPASS on 9/11/2022    - not assessed today, patient will be seen by surgeon  - currently on bactrim for the infection on the left groin

## 2022-10-04 NOTE — PROGRESS NOTES
[address] MANAGEMENT   AND HYPERBARIC MEDICINE CENTER       Patient ID: Kendra Canada is a [de-identified] y o  male Date of Birth 1940     [facility] of [facility]: [facility]    Performed wound round with: Wound team     Chief Complaint : Coccys, suprapubic, left lower leg    Wound Instructions:  Wound:  Coccyx  Discontinue previous wound order  Cleanse the wound bed with NSS   Apply non-sting skin prep to periwound area  Apply Triad paste to wound bed  Frequency : twice a day and prn for soiling    Offload all wounds  Turn and reposition frequently  Increase protein intake  Monitor for any sign of infection or worsening, inform PCP or patient's primary physician in your facility  Allergies  Pneumococcal vaccine      Assessment & Plan:  1  Pressure injury of sacral region, stage 3 ([facility])  Assessment & Plan:  Sacrum / Coccyx  - this is a chronic wound  - slight decrease in wound size, 100% granulation, with no obvious sign of infection  - local wound care with Triad paste   - continue to offload  - followup next week        2  Surgical wound present  Assessment & Plan:  History :  - LEFT FEMORAL ARTERY THROMBECTOMY, LEFT LOWER EXTREMITY THROMBOEMBOLECTOMY AND FASCIOTOMIES, RIGHT TO LEFT FEMORAL-FEMORAL BYPASS on 9/11/2022    - not assessed today, patient will be seen by surgeon  - currently on bactrim for the infection on the left groin             Subjective:   9/20/2022This is a [de-identified] y o , male referred to our service for wound/ skin alterations on coccyx and left lower leg  Patient have a complex medical history including but not limited to  Cancer [facility]), COPD (chronic obstructive pulmonary disease) ([facility] [address] ), Decubitus ulcer of sacral region, stage 3 ([facility] [address] ), Diverticulitis, ETOH abuse and Nicotine abuse    Patient was referred by Senior Care Team  Patient was seen in collaboration with the facility wound team      Wound History:   As per medical record review, patient had critical limb ischemia and had LEFT FEMORAL ARTERY THROMBECTOMY, LEFT LOWER EXTREMITY THROMBOEMBOLECTOMY AND FASCIOTOMIES, RIGHT TO LEFT FEMORAL-FEMORAL BYPASS on 9/11/2022  The wound on the coccyx is chronic  Received patient in bed, seems comfortable  Denies pain  Current treatment on wound is dry sterile dressing  Patient currently on isoloation for COVID     9/27/2022 Follow up for wound on the buttocks and left groin  Received patient, not in distress  Facility staff did not report any significant issues related to the wound  Denies pain, no fever  10/4/2022 Follow up for wound on the coccyx  Received patient, not in distress  Facility staff did not report any significant issues related to the wound  As per report, patient still on Bactrim for infection on the left groin  Patient will be seen by surgeon today at 7 pm               Review of Systems   Constitutional: Negative  HENT: Negative  Eyes: Negative  Respiratory: Negative  Cardiovascular: Negative  Gastrointestinal: Negative  Endocrine: Negative  Genitourinary: Negative  Musculoskeletal: Positive for gait problem  Skin: Positive for wound  See HPI   Hematological: Negative  Psychiatric/Behavioral: Negative  All other systems reviewed and are negative  Objective:    Physical Exam  Constitutional:       Appearance: Normal appearance  HENT:      Head: Normocephalic and atraumatic  Nose: Nose normal       Mouth/Throat:      Mouth: Mucous membranes are moist    Eyes:      Conjunctiva/sclera: Conjunctivae normal    Pulmonary:      Effort: Pulmonary effort is normal    Abdominal:      Tenderness: There is no abdominal tenderness  Genitourinary:     Comments: continent  Musculoskeletal:      Cervical back: Normal range of motion  Comments: LROM   Skin:     Findings: Lesion present        Comments: Coccyx - wound size is 1 x 0 3 x 0 3 cm , 100% granulation, periwound is normal, no drainage, no obvious sign of infection no malodor    All surgical incision not assessed today, patient will be seen by the surgeon  Neurological:      Mental Status: He is alert  Gait: Gait abnormal    Psychiatric:         Mood and Affect: Mood normal          Behavior: Behavior normal               Procedures           Patient's care was coordinated with nursing facility staff  Recent vitals, labs and updated medications were reviewed on EMR or chart system of facility  Past Medical, surgical, social, medication and allergy history and patient's previous records were reviewed and updated as appropriate: Most up-to date information is available in the facility EMR where the patient is currently admitted      Patient Active Problem List   Diagnosis    Tremor, essential    Other specified polyneuropathies    Malignant neoplasm of prostate ([facility] [address] )    Chronic pansinusitis    Alcoholic cirrhosis of liver (HCC)    COPD (chronic obstructive pulmonary disease) (HCC)    Gout    Hepatocellular carcinoma (HCC)    Hypertension    LIANA (obstructive sleep apnea)    Pulmonary nodule    Contracture of palmar fascia    Constipation    Diverticulitis    Gastroesophageal reflux disease    Impaired fasting glucose    Lyme disease    Tremor    Tobacco dependence syndrome    History of prostate cancer    Thyroid nodule    Synovial cyst of right popliteal space    Hyperplasia of prostate    Ventral hernia without obstruction or gangrene    Diverticulosis    Encounter for well adult exam with abnormal findings    Leucopenia    Spondylosis of lumbar region without myelopathy or radiculopathy    Thrombocytopenia (HCC)    Mild episode of recurrent major depressive disorder ([address] )    Medicare annual wellness visit, subsequent    Malignant neoplasm of upper lobe of right lung ([address] )    Gross hematuria    Non compliance w medication regimen    Urothelial carcinoma of bladder ([address] )    Severe protein-energy malnutrition ([address] )    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 ([facility] )    Malignant neoplasm of lower lobe of right lung (HCC)    Hematuria    Alcohol dependence, daily use (HCC)    Seborrheic dermatitis of scalp    Tobacco abuse    Fall    Gait abnormality    Hypothyroidism due to medication    Laceration of forehead without complication    Closed fracture of second lumbar vertebra (HCC)    Pressure injury of sacral region, stage 3 (HCC)    Surgical wound present    COVID     Past Medical History:   Diagnosis Date    Anemia     dur to blood loss in the past    BMI 25 0-25 9,adult 5/24/2019    BPH (benign prostatic hyperplasia) 2002    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[place] [address] )     Cancer (HCC)     hepatocellular, prostate    Chronic bronchitis (HCC)     Chronic bronchitis ([place] [address] ) 7/19/2016    Last Assessment & Plan:  He has symptoms of chronic bronchitis  He would of course benefit from smoking cessation  I have recommended a trial of Spiriva      Colitis     COPD (chronic obstructive pulmonary disease) (HCC)     chronic bronchitis    Decubitus ulcer of sacral region, stage 3 (HCC) 3/12/2018    Diverticulitis     Essential tremor     ETOH abuse     GERD (gastroesophageal reflux disease)     Gout     Hearing loss     Hemorrhoids     History of Lyme disease     History of thrombocytopenia     chronic mild    History of transfusion     HTN (hypertension)     Hydrocele     Hyperlipidemia     Hypertension     Hyperthyroidism     Irritable bowel syndrome     Liver disease     cirrhosis    Lyme disease     Meningioma (HCC)     Meningioma (HCC)     left frontal- followed by Neurosurgery    Nicotine abuse     2-3 packs    Obstructive sleep apnea     unable to tolerate CPAP    Overweight with body mass index (BMI) of 25 to 25 9 in adult 11/3/2021    Pressure injury of skin     Stage 3 that healed and has reoccurred    Pulmonary nodule     Sensory polyneuropathy     Sleep apnea     Spondylosis of lumbar region without myelopathy or radiculopathy 10/25/2019    Thyroid nodule     Tobacco abuse     Tremor      Past Surgical History:   Procedure Laterality Date    APPENDECTOMY      CATARACT EXTRACTION      debra    ESOPHAGOGASTRODUODENOSCOPY      HAND SURGERY  04/30/2018   Syliva Travon LIVER SURGERY  03/09/2018    DE REMV PILONIDAL LESION SIMPLE N/A 9/28/2018    Procedure: PILONIDAL CYSTECTOMY;  Surgeon: Dionne Rosario DO;  Location:  MAIN OR;  Service: [facility] THYROID SURGERY      TONSILLECTOMY       Social History     Socioeconomic History    Marital status:       Spouse name: None    Number of children: None    Years of education: None    Highest education level: None   Occupational History    Occupation:    Tobacco Use    Smoking status: Current Every Day Smoker     Packs/day: 1 00     Types: Cigarettes     Start date: 3/25/1961    Smokeless tobacco: Never Used    Tobacco comment: hx of nicotine abuse 2-3 packs   Vaping Use    Vaping Use: Never used   Substance and Sexual Activity    Alcohol use: Yes     Comment: 2-3 oz daily     Drug use: No    Sexual activity: Not Currently     Partners: Female   Other Topics Concern    None   Social History Narrative    Hx of caffeine abuse    Hx of ETOH abuse     Social Determinants of Health     Financial Resource Strain: Not on file   Food Insecurity: Not on file   Transportation Needs: Not on file   Physical Activity: Not on file   Stress: Not on file   Social Connections: Not on file   Intimate Partner Violence: Not on file   Housing Stability: Not on file        Current Outpatient Medications:     albuterol (PROVENTIL HFA,VENTOLIN HFA) 90 mcg/act inhaler, INHALE 2 PUFFS BY MOUTH EVERY 6 HOURS AS NEEDED FOR WHEEZING, Disp: 18 g, Rfl: 2    budesonide-formoterol (Symbicort) 160-4 5 mcg/act inhaler, Inhale 2 puffs 2 (two) times a day Rinse mouth after use , Disp: 10 2 g, Rfl: 2    Cholecalciferol 1000 units CHEW, Chew 5,000 Units daily, Disp: , Rfl:     losartan (COZAAR) 50 mg tablet, Take 1 tablet (50 mg total) by mouth daily, Disp: 30 tablet, Rfl: 10  Family History   Problem Relation Age of Onset    Diabetes Father     Other Father         colon problems/colostomy    Alcohol abuse Sister               Coordination of Care: Wound team aware of the treatment plan  Facility nurse will provide wound treatment and monitor the wound for any changes  Patient / Staff education : Patient / Staff was given education on sign of infection and pressure ulcer prevention  Patient/ Staff verbalized understanding     Follow up :  Next week    Voice-recognition software may have been used in the preparation of this document  Occasional wrong word or "sound-alike" substitutions may have occurred due to the inherent limitations of voice recognition software  Interpretation should be guided by context        Sander Cabrera

## 2022-10-11 ENCOUNTER — NURSING HOME VISIT (OUTPATIENT)
Dept: WOUND CARE | Facility: HOSPITAL | Age: 82
End: 2022-10-11
Payer: COMMERCIAL

## 2022-10-11 DIAGNOSIS — L89.153 PRESSURE INJURY OF SACRAL REGION, STAGE 3 (HCC): Primary | ICD-10-CM

## 2022-10-11 DIAGNOSIS — T14.8XXA SURGICAL WOUND PRESENT: ICD-10-CM

## 2022-10-11 PROBLEM — S01.81XA LACERATION OF FOREHEAD WITHOUT COMPLICATION: Status: RESOLVED | Noted: 2022-07-26 | Resolved: 2022-10-11

## 2022-10-11 PROCEDURE — 99308 SBSQ NF CARE LOW MDM 20: CPT | Performed by: NURSE PRACTITIONER

## 2022-10-11 NOTE — ASSESSMENT & PLAN NOTE
History :  - LEFT FEMORAL ARTERY THROMBECTOMY, LEFT LOWER EXTREMITY THROMBOEMBOLECTOMY AND FASCIOTOMIES, RIGHT TO LEFT FEMORAL-FEMORAL BYPASS on 9/11/2022    - continue to followup with surgeon  - the surgical incision on the left groin improved, mild redness

## 2022-10-11 NOTE — ASSESSMENT & PLAN NOTE
"  11/19/2019      RE: Danielle Wheat  [address]  [place] [id number]         Psychiatry Clinic Progress Note                                                                   Danielle Wheat is a 19 year old female who prefers the name Danielle and pronoun she, her, hers.  Therapist: Lavonne at [facility] in [place]   PCP: Mili Rai  Other Providers: Pediatric Cystic Fibrosis Team, Pediatric Endocrinology     Pertinent Background:  See previous notes.  Psych critical item history includes suicidal ideation.     Interim History                                                                                                        4, 4     The patient was last seen 10/22/19.  Increase Lexapro to 15 mg.  She feels she is tolerating this medication quite well without any noticeable side effects.  Plan as below:    1. Increase Lexapro to 15 mg daily.  2. Please follow-up with me in 4 weeks; sooner if needed.        3. Therapy; continue interpersonal therapy; agree with plan to provide [facility] records for new therapist  4. Other: Continue with your work focusing on: nutrition, structured sleep, exercise (some form of body movement daily, consider yoga which could be done alone in dorm room), meditation (Headspace), structured social activities  4. Please contact [facility] for any questions or concerns.    Today:  Danielle is feeling quite anxious and sad which she relates to the increased demand at school.   She is managing by \"going to class, doing what I can, working as hard as I am able.\"  She feels that exams are the most difficult for her.  She has been seeking out a  for certain subjects.  Her anxiety increases significantly when she is in a course that will require an exam.  She has dropped one course (gym) so now her course load is 15 credits (rather than 17 credits.)      -mood: depressive symptoms are 4/10 (10 is best), endorses fatigues (weather related), low moods are generally constant " Katie Bile / Coccyx  - this is a chronic wound  - slight decrease in wound size - 0 8 x 0 2 cm, 100% granulation, with no obvious sign of infection  - local wound care  - continue [employer] paste   - continue to offload  - followup next week "though some days are worse than others, low motivation (she is able to \"make myself go to class, see friends\" and is then able to enjoy these activities), negative cognitive distortions \"I'm not good enough, why am I here\"  -anxiety: 5/10 (10 is best), patient reports she feels most anxious when an exam is approaching, leads to trouble sleeping, endorses poor concentration due to worries that she isn't studying or focusing on the appropriate task   -sleep: \"ok,\" she sleeps about 8 hrs per night, catch up sleep on weekend, no trouble w/onset or maintenance unless test is coming up    -social: many friends in dorm, has several close friends, positive outlet for her   -new therapy in the past four weeks; has had two sessions, feels like this will be a good fit   -generally feels pretty \"good\" about the way she is able to manage her CF     Safety concerns include ongoing passive suicidal thoughts; no change following switch to Lexapro.  She is aware that engaging in an activity or with peers helps her distract from passive SI thoughts though when she has these thoughts at night this is more difficult for her. Patient denies any active thoughts of suicide.  She denies any thoughts of or urges to self-harm.  We reviewed the black box warning with respect to initiation of SSRIs.      Recent Symptoms:   Psychosis:  none  ADVERSE EFFECTS: denies    MEDICAL CONCERNS: none; tolerating escitalopram well       Recent Substance Use:  none reported        Social/ Family History                                  [per patient report]                                 1ea,1ea   School: in middle of 2nd quarter of college; enjoys this though feels it is \"a lot\"  No substance use  Not sexually active   No family history of early cardiac disease; MIs, arrhythmias, cardiomyopathy, or sudden cardiac death.      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)     Other constipation     S/P appendectomy     Anxiety     Moderate episode of recurrent major depressive disorder (H)       Past Surgical History:   Procedure Laterality Date     LAPAROSCOPIC APPENDECTOMY CHILD N/A 12/11/2016    Procedure: LAPAROSCOPIC APPENDECTOMY CHILD;  Surgeon: Alejo Kidd MD;  Location: [facility] OR     NO HISTORY OF SURGERY       OPTICAL TRACKING SYSTEM ENDOSCOPIC SINUS SURGERY  8/8/2014    Procedure: OPTICAL TRACKING SYSTEM ENDOSCOPIC SINUS SURGERY;  Surgeon: Bear Pierce MD;  Location: [facility] OR     OPTICAL TRACKING SYSTEM ENDOSCOPIC SINUS SURGERY N/A 12/6/2016    Procedure: OPTICAL TRACKING SYSTEM ENDOSCOPIC SINUS SURGERY;  Surgeon: Radha Bernabe MD;  Location: [facility] OR     OPTICAL TRACKING SYSTEM ENDOSCOPIC SINUS SURGERY Bilateral 3/12/2019    Procedure: BILATERAL FUNCTIONAL ENDOSCOPIC SINUS SURGERY STEALTH GUIDED;  Surgeon: Radha Bernabe MD;  Location: [facility] OR        Medical Review of Systems                                                                                                    2,10   Neuro: denies headaches   MSK: back pain; chronic  Endorses fatigue   The remainder of the review of systems is noncontributory    Allergy                                Seasonal allergies     Current Medications                                                                                                       Current Outpatient Medications   Medication Sig Dispense Refill     albuterol (PROAIR HFA/PROVENTIL HFA/VENTOLIN HFA) 108 (90 Base) MCG/ACT Inhaler Inhale 2 puffs into the lungs every 6 hours as needed for shortness of breath / dyspnea or wheezing 1 Inhaler 3     albuterol (PROVENTIL) (2.5 MG/3ML) 0.083% neb  solution Take 1 vial (2.5 mg) by nebulization 2 times daily . May increase to 3 times daily with increased cough/cold symptoms. 270 vial 3     amylase-lipase-protease (CREON) 76609 UNITS CPEP per EC capsule Take 5 with meals and 2-3 with snacks. 2160 capsule 4     azithromycin (ZITHROMAX) 500 MG tablet Take 1 tablet (500 mg) by mouth Every Mon, Wed, Fri Morning Resume after Levaquin dose is complete. 40 tablet 3     BASAGLAR 100 UNIT/ML injection Inject 12 units daily. 15 mL 11     blood glucose monitoring (ONE TOUCH DELICA) lancets Use to test blood sugar 4 times daily or as directed. 1 Box 12     blood glucose monitoring (ONE TOUCH VERIO IQ) test strip Use to test blood sugars 4 times daily or as directed. 150 strip 12     blood glucose monitoring (ONETOUCH VERIO SYNC SYSTEM) meter device kit Use to test blood sugar 4 times daily or as directed, 1 kit home and 1 kit school 2 kit 12     budesonide (PULMICORT) 0.5 MG/2ML neb solution Spray 2 mLs (0.5 mg) in nostril daily 30 ampule 11     cholecalciferol (VITAMIN D3) 53932 units capsule Take 1 capsule (50,000 Units) by mouth twice a week 26 capsule 3     Continuous Blood Gluc  (DEXCOM G6 ) NAHUN 1 each See Admin Instructions 1 Device 0     Continuous Blood Gluc Sensor (DEXCOM G6 SENSOR) MISC 3 each every 30 days 3 each 11     Continuous Blood Gluc Transmit (DEXCOM G6 TRANSMITTER) MISC 1 each every 3 months 1 each 3     dornase alpha (PULMOZYME) 1 MG/ML neb solution Inhale 2.5 mg into the lungs 2 times daily 450 mL 3     escitalopram (LEXAPRO) 10 MG tablet Take 1.5 tablets (15 mg) by mouth daily 45 tablet 2     fluticasone (FLONASE) 50 MCG/ACT nasal spray Spray 1 spray into both nostrils daily Spray 1 spray in each nostril q day 16 g 3     fluticasone (FLOVENT HFA) 44 MCG/ACT inhaler Inhale 2 puffs into the lungs 2 times daily 3 Inhaler 3     insulin aspart (NOVOLOG FLEXPEN) 100 UNIT/ML pen Use up to 20 units daily per MD instructions 15 mL 6      "insulin glargine (LANTUS SOLOSTAR) 100 UNIT/ML pen Inject 12 units daily 15 mL 12     insulin pen needle (NOVOFINE PLUS) 32G X 4 MM Use 1 pen needles daily or as directed. 100 each 0     levonorgestrel (MIRENA) 20 MCG/24HR IUD 1 each by Intrauterine route once Placed 5/2016       montelukast (SINGULAIR) 10 MG tablet Take 1 tablet (10 mg) by mouth At Bedtime 90 tablet 3     Multivitamins CF Formula (MVW COMPLETE FORMULATION) CAPS softgel capsule Take 2 capsules by mouth daily 60 capsule 3     omeprazole (PRILOSEC) 20 MG CR capsule Take 1 capsule (20 mg) by mouth daily 30 capsule 1     polyethylene glycol (MIRALAX) powder Take 17 g (1 capful) by mouth 2 times daily 1 Bottle 11     sodium fluoride (LURIDE) 2.2 (1 F) MG per chewable tablet Take 1 tablet (2.2 mg) by mouth daily 90 tablet 2     tezacaftor-ivacaftor & ivacaftor (SYMDEKO) 100-150 & 150 MG tablet pack Take 1 tablet (yellow) by mouth every morning and 1 tablet (light blue) in the evening.  Swallow whole and take with fat-containing food. 56 tablet 11     tobramycin, PF, (KYLEE) 300 MG/5ML neb solution Take 5 mLs (300 mg) by nebulization 2 times daily Every other month. 560 mL 3     Wound Dressing Adhesive (MASTISOL ADHESIVE) LIQD Externally apply topically See Admin Instructions Apply to site prior to placement of Dexcom G6 sensor 15 mL 6     Vitals                                                                                                                       3, 3   /85   Pulse 103   Resp 20   Ht 5' 6.06\" (167.8 cm)   Wt 253 lb 15.5 oz (115.2 kg)   SpO2 98%   BMI 40.91 kg/m   ; 96 on recheck s/p Tylenol     Mental Status Exam                                                                                    9, 14 cog gs     Alertness: alert  and oriented  Appearance: casually groomed  Behavior/Demeanor: cooperative, pleasant and calm, with good  eye contact   Speech: normal, volume is soft at times   Language: intact  Psychomotor: normal or " "unremarkable  Mood: \"I'm feeling ok, really worried about school\"  Affect: full range; was congruent to mood; was congruent to content, somewhat anxious   Thought Process/Associations: unremarkable though somewhat repetitive in her thoughts - not to point of perseveration   Thought Content:  Reports passive suicidal thoughts intermittently over the past two weeks;  Denies violent ideation and active suicidal thoughts   Perception:  Reports none;    Insight: fair  Judgment: fair  Cognition: (6) does appear grossly intact; formal cognitive testing was not done  Gait/Station and/or Muscle Strength/Tone: unremarkable    Labs and Data                                                                                                                 J CARLOS-7 SCORE 6/6/2019 9/10/2019 11/19/2019   Total Score 3 1 7       PHQ-9 SCORE 7/19/2019 9/10/2019 11/19/2019   PHQ-9 Total Score 15 10 -   PHQ-A Total Score - - 11         Diagnosis                                                                                                            MDD; recurrent, mild-moderate; consider persistent depressive disorder   Anxiety; unspecified     Assessment                                                                                                     m2, h3     TODAY: Danielle is a pleasant and engaging 20 yo woman with an extensive PMH including cystic fibrosis with numerous sequelae including chronic pansinusitis and DM, obesity, and depression who presents for follow-up after cessation of sertraline and initiation of Lexapro.  She has not had any side effects with the increase in Lexapro.  She continues to have depressive symptoms (low mood, low motivation, low energy, negative cognitive distortions, passive suicidal thoughts) and has had an increase in her anxiety which seems to be related to stressors at school/academically.  She does continue to have passive suicidal ideation though she denies any thoughts of SIB or active thoughts " of suicide. These have not changed since initiation of Lexapro.      Reviewed again at length today the importance of multifactorial approach to treatment of her depression including optimization of medications (consider increase of Lexapro to 20 mg today), engagement in effective therapy (started IPT last month) and lifestyle changes including improved nutrition, structured sleep, exercise (some form of body movement daily), structured social activities.  She agrees with this though does acknowledge that she will not be able to modify her school schedule at this time.     Patient has endorsed chronic passive suicidal ideation; remains at chronically elevated risk.  Protective factors include patients future-orientation, career goals, strong family support, motivation to get better.  Patient is appropriate for outpatient management at this time.      Plan                                                                                                                     m2, h3     1. Continue Lexapro at 15 mg daily; check in 4-6 weeks to consider further increase  2. Please follow-up with me in 4 weeks; sooner if needed.        3. Therapy; continue interpersonal therapy; agree with plan to provide [facility] records for new therapist  4. Other: Continue with your work focusing on: nutrition, structured sleep, exercise (some form of body movement daily, consider yoga which could be done alone in dorm room), meditation (Headspace), structured social activities  5. Please check-in with school support staff to learn more about your ability to receive/better understand your accommodations for academic coursework; strongly consider modifying your course load given the increase in anxiety you are experiencing which seems to directly relate to your course load  6. Please contact Autumn for any questions or concerns.      RTC: 4 weeks      CRISIS NUMBERS:   Provided routinely in AVS.    Treatment Risk Statement:  The patient  understands the risks, benefits, adverse effects and alternatives. Agrees to treatment with the capacity to do so. No medical contraindications to treatment. Agrees to call clinic for any problems. The patient understands to call 911 or go to the nearest ED if life threatening or urgent symptoms occur.       Psychiatry Clinic Individual Psychotherapy Note                                                                     [16]     Start time - 2:11        End time - 2:35  Date last reviewed - 8/20/19      Date next due - 11/20/19     Subjective: This supportive psychotherapy session addressed issues related to patient's history , current stressors consisting of start of school, stress or coursework and living independently, transition to eating from cafeteria .  Patient's reaction: Action in the context of mental status appropriate for ambulatory setting.  Progress: working towards improved lifestyle changes w/respect to nutrition and exercise   Plan: RTC 4 weeks; Review treatment plan during next visit    Psychotherapy services during this visit included myself and the patient.   Treatment Plan      SYMPTOMS; PROBLEMS   MEASURABLE GOALS;    FUNCTIONAL IMPROVEMENT INTERVENTIONS;   GAINS MADE DISCHARGE CRITERIA   Depression: depressed mood, low energy, hypersomnia and suicidal ideation   reduce depressive symptoms and reduce suicidal thoughts Continues with symptoms; no longer experiencing guilt  marked symptom improvement    Anxiety: excessive worry and nervous/overwhelmed   reduce panic attacks/ excessive worry and improve nutrition Increase in anxiety noted today  marked symptom improvement     PROVIDER:  MD Rachael Church MD

## 2022-10-11 NOTE — PROGRESS NOTES
[address] MANAGEMENT   AND [facility]PER[facility]INE CENTER       Patient ID: Coni Davis is a [de-identified] y o  male Date of Birth 1940     Location of Service: [facility]     Performed wound round with: Wound team     Chief Complaint : Coccys, suprapubic, left lower leg    Wound Instructions:  Wound:  Coccyx  Discontinue previous wound order  Cleanse the wound bed with NSS   Apply non-sting skin prep to periwound area  Apply Triad paste to wound bed  Frequency : twice a day and prn for soiling    Offload all wounds  Turn and reposition frequently  Increase protein intake  Monitor for any sign of infection or worsening, inform PCP or patient's primary physician in your facility  Allergies  Pneumococcal vaccine      Assessment & Plan:  1  Pressure injury of sacral region, stage 3 ([facility])  Assessment & Plan:  Sacrum / Coccyx  - this is a chronic wound  - slight decrease in wound size - 0 8 x 0 2 cm, 100% granulation, with no obvious sign of infection  - local wound care  - continue Triad paste   - continue to offload  - followup next week        2  Surgical wound present  Assessment & Plan:  History :  - LEFT FEMORAL ARTERY THROMBECTOMY, LEFT LOWER EXTREMITY THROMBOEMBOLECTOMY AND FASCIOTOMIES, RIGHT TO LEFT FEMORAL-FEMORAL BYPASS on 9/11/2022    - continue to followup with surgeon  - the surgical incision on the left groin improved, mild redness             Subjective:   9/20/2022This is a [de-identified] y o , male referred to our service for wound/ skin alterations on coccyx and left lower leg  Patient have a complex medical history including but not limited to  Cancer [facility]), COPD (chronic obstructive pulmonary disease) ([facility] [address] ), Decubitus ulcer of sacral region, stage 3 ([facility] [address] ), Diverticulitis, ETOH abuse and Nicotine abuse    Patient was referred by Senior Care Team  Patient was seen in collaboration with the facility wound team      Wound History:   As per medical record review, patient had critical limb ischemia and had LEFT FEMORAL ARTERY THROMBECTOMY, LEFT LOWER EXTREMITY THROMBOEMBOLECTOMY AND FASCIOTOMIES, RIGHT TO LEFT FEMORAL-FEMORAL BYPASS on 9/11/2022  The wound on the coccyx is chronic  Received patient in bed, seems comfortable  Denies pain  Current treatment on wound is dry sterile dressing  Patient currently on isoloation for COVID     9/27/2022 Follow up for wound on the buttocks and left groin  Received patient, not in distress  Facility staff did not report any significant issues related to the wound  Denies pain, no fever  10/4/2022 Follow up for wound on the coccyx  Received patient, not in distress  Facility staff did not report any significant issues related to the wound  As per report, patient still on Bactrim for infection on the left groin  Patient will be seen by surgeon today at 7 pm     10/11/2022 Follow up for wound on the coccyx    Received patient, not in distress  Facility staff did not report any significant issues related to the wound  Denies pain  Review of Systems   Constitutional: Negative  HENT: Negative  Eyes: Negative  Respiratory: Negative  Cardiovascular: Negative  Gastrointestinal: Negative  Endocrine: Negative  Genitourinary: Negative  Musculoskeletal: Positive for gait problem  Skin: Positive for wound  See HPI   Hematological: Negative  Psychiatric/Behavioral: Negative  All other systems reviewed and are negative  Objective:    Physical Exam  Constitutional:       Appearance: Normal appearance  HENT:      Head: Normocephalic and atraumatic  Nose: Nose normal       Mouth/Throat:      Mouth: Mucous membranes are moist    Eyes:      Conjunctiva/sclera: Conjunctivae normal    Pulmonary:      Effort: Pulmonary effort is normal    Abdominal:      Tenderness: There is no abdominal tenderness  Genitourinary:     Comments: continent  Musculoskeletal:      Cervical back: Normal range of motion        Comments: LROM Skin:     Findings: Lesion present  Comments: Coccyx - wound size is 0 8 x 0 2 x 0 1 cm , 100% granulation, periwound is normal, no drainage, no obvious sign of infection no malodor    Left groin - sutured, no malodor, no drainage, mild erythema   Neurological:      Mental Status: He is alert  Gait: Gait abnormal    Psychiatric:         Mood and Affect: Mood normal          Behavior: Behavior normal               Procedures           Patient's care was coordinated with nursing facility staff  Recent vitals, labs and updated medications were reviewed on EMR or chart system of facility  Past Medical, surgical, social, medication and allergy history and patient's previous records were reviewed and updated as appropriate: Most up-to date information is available in the facility EMR where the patient is currently admitted      Patient Active Problem List   Diagnosis   • Tremor, essential   • Other specified polyneuropathies   • Malignant neoplasm of prostate ([facility] [address] )   • Chronic pansinusitis   • Alcoholic cirrhosis of liver (HCC)   • COPD (chronic obstructive pulmonary disease) (HCC)   • Gout   • Hepatocellular carcinoma (HCC)   • Hypertension   • LIANA (obstructive sleep apnea)   • Pulmonary nodule   • Contracture of palmar fascia   • Constipation   • Diverticulitis   • Gastroesophageal reflux disease   • Impaired fasting glucose   • Lyme disease   • Tremor   • Tobacco dependence syndrome   • History of prostate cancer   • Thyroid nodule   • Synovial cyst of right popliteal space   • Hyperplasia of prostate   • Ventral hernia without obstruction or gangrene   • Diverticulosis   • Encounter for well adult exam with abnormal findings   • Leucopenia   • Spondylosis of lumbar region without myelopathy or radiculopathy   • Thrombocytopenia (HCC)   • Mild episode of recurrent major depressive disorder ([facility] [address] )   • Medicare annual wellness visit, subsequent   • Malignant neoplasm of upper lobe of right lung ([facility] [address] )   • Gross hematuria   • Non compliance w medication regimen   • Urothelial carcinoma of bladder (HCC)   • Severe protein-energy malnutrition (HCC)   • 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 [facility])   • Malignant neoplasm of lower lobe of right lung (HCC)   • Hematuria   • Alcohol dependence, daily use (HCC)   • Seborrheic dermatitis of scalp   • Tobacco abuse   • Fall   • Gait abnormality   • Hypothyroidism due to medication   • Closed fracture of second lumbar vertebra (HCC)   • Pressure injury of sacral region, stage 3 ([facility] [address] )   • Surgical wound present   • COVID     Past Medical History:   Diagnosis Date   • Anemia     dur to blood loss in the past   • BMI 25 0-25 9,adult 5/24/2019   • BPH (benign prostatic hyperplasia) 2002   • 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[address] )    • Cancer (HCC)     hepatocellular, prostate   • Chronic bronchitis (HCC)    • Chronic bronchitis ([facility] ) 7/19/2016    Last Assessment & Plan:  He has symptoms of chronic bronchitis  He would of course benefit from smoking cessation  I have recommended a trial of Spiriva     • Colitis    • COPD (chronic obstructive pulmonary disease) (HCC)     chronic bronchitis   • Decubitus ulcer of sacral region, stage 3 ([facility] [address] ) 3/12/2018   • Diverticulitis    • Essential tremor    • ETOH abuse    • GERD (gastroesophageal reflux disease)    • Gout    • Hearing loss    • Hemorrhoids    • History of Lyme disease    • History of thrombocytopenia     chronic mild   • History of transfusion    • HTN (hypertension)    • Hydrocele    • Hyperlipidemia    • Hypertension    • Hyperthyroidism    • Irritable bowel syndrome    • Liver disease     cirrhosis   • Lyme disease    • Meningioma (HCC)    • Meningioma (HCC)     left frontal- followed by Neurosurgery   • Nicotine abuse     2-3 packs   • Obstructive sleep apnea     unable to tolerate CPAP   • Overweight with body mass index (BMI) of 25 to 25 9 in adult 11/3/2021   • Pressure injury of skin     Stage 3 that healed and has reoccurred   • Pulmonary nodule    • Sensory polyneuropathy    • Sleep apnea    • Spondylosis of lumbar region without myelopathy or radiculopathy 10/25/2019   • Thyroid nodule    • Tobacco abuse    • Tremor      Past Surgical History:   Procedure Laterality Date   • APPENDECTOMY     • CATARACT EXTRACTION      debra   • ESOPHAGOGASTRODUODENOSCOPY     • HAND SURGERY  04/30/2018   • LIVER SURGERY  03/09/2018   • NE REMV PILONIDAL LESION SIMPLE N/A 9/28/2018    Procedure: PILONIDAL CYSTECTOMY;  Surgeon: Dexter Lewis DO;  Location: [address] [place] [address];  Service: General   • THYROID SURGERY     • TONSILLECTOMY       Social History     Socioeconomic History   • Marital status:       Spouse name: None   • Number of children: None   • Years of education: None   • Highest education level: None   Occupational History   • Occupation:    Tobacco Use   • Smoking status: Current Every Day Smoker     Packs/day: 1 00     Types: Cigarettes     Start date: 3/25/1961   • Smokeless tobacco: Never Used   • Tobacco comment: hx of nicotine abuse 2-3 packs   Vaping Use   • Vaping Use: Never used   Substance and Sexual Activity   • Alcohol use: Yes     Comment: 2-3 oz daily    • Drug use: No   • Sexual activity: Not Currently     Partners: Female   Other Topics Concern   • None   Social History Narrative    Hx of caffeine abuse    Hx of ETOH abuse     Social Determinants of Health     Financial Resource Strain: Not on file   Food Insecurity: Not on file   Transportation Needs: Not on file   Physical Activity: Not on file   Stress: Not on file   Social Connections: Not on file   Intimate Partner Violence: Not on file   Housing Stability: Not on file        Current Outpatient Medications:   •  albuterol (PROVENTIL HFA,VENTOLIN HFA) 90 mcg/act inhaler, INHALE 2 PUFFS BY MOUTH EVERY 6 HOURS AS NEEDED FOR WHEEZING, Disp: 18 g, Rfl: 2  •  budesonide-formoterol (Symbicort) 160-4 5 mcg/act inhaler, Inhale 2 puffs 2 (two) times a day Rinse mouth after use , Disp: 10 2 g, Rfl: 2  • Cholecalciferol 1000 units CHEW, Chew 5,000 Units daily, Disp: , Rfl:   •  losartan (COZAAR) 50 mg tablet, Take 1 tablet (50 mg total) by mouth daily, Disp: 30 tablet, Rfl: 10  Family History   Problem Relation Age of Onset   • Diabetes Father    • Other Father         colon problems/colostomy   • Alcohol abuse Sister               Coordination of Care: Wound team aware of the treatment plan  Facility nurse will provide wound treatment and monitor the wound for any changes  Patient / Staff education : Patient / Staff was given education on sign of infection and pressure ulcer prevention  Patient/ Staff verbalized understanding     Follow up :  Next week    Voice-recognition software may have been used in the preparation of this document  Occasional wrong word or "sound-alike" substitutions may have occurred due to the inherent limitations of voice recognition software  Interpretation should be guided by burke Bhatti

## 2022-10-18 ENCOUNTER — NURSING HOME VISIT (OUTPATIENT)
Dept: WOUND CARE | Facility: HOSPITAL | Age: 82
End: 2022-10-18

## 2022-10-18 DIAGNOSIS — L89.153 PRESSURE INJURY OF SACRAL REGION, STAGE 3 (HCC): Primary | ICD-10-CM

## 2022-10-18 NOTE — PROGRESS NOTES
[address] MANAGEMENT   AND HYPERBARIC MED[facility]INE CENTER       Patient ID: Scotty Fowler is a [de-identified] y o  male Date of Birth 1940     Location of Service: [facility]     Performed wound round with: Wound team     Chief Complaint : Coccy    Wound Instructions:  Wound:  Coccyx  Discontinue previous wound order  Cleanse the skin with soap and water, pat dry  Apply zinc oxide to wound bed  Frequency : twice a day and prn for soiling    Offload all wounds  Turn and reposition frequently  Increase protein intake  Monitor for any sign of infection or worsening, inform PCP or patient's primary physician in your facility  Allergies  Pneumococcal vaccine      Assessment & Plan:  1  Pressure injury of sacral region, stage 3 ([facility])  Assessment & Plan:  Sacrum / Coccyx  - this is a chronic wound  - wound healed  - zguard for prevention  - sign off             Subjective:   9/20/2022This is a [de-identified] y o , male referred to our service for wound/ skin alterations on coccyx and left lower leg  Patient have a complex medical history including but not limited to  Cancer [facility]), COPD (chronic obstructive pulmonary disease) ([facility] [address] ), Decubitus ulcer of sacral region, stage 3 ([facility] [address] ), Diverticulitis, ETOH abuse and Nicotine abuse   Patient was referred by Senior Care Team  Patient was seen in collaboration with the facility wound team      Wound History:   As per medical record review, patient had critical limb ischemia and had LEFT FEMORAL ARTERY THROMBECTOMY, LEFT LOWER EXTREMITY THROMBOEMBOLECTOMY AND FASCIOTOMIES, RIGHT TO LEFT FEMORAL-FEMORAL BYPASS on 9/11/2022  The wound on the coccyx is chronic  Received patient in bed, seems comfortable  Denies pain  Current treatment on wound is dry sterile dressing  Patient currently on isoloation for COVID     9/27/2022 Follow up for wound on the buttocks and left groin  Received patient, not in distress   Facility staff did not report any significant issues related to the wound  Denies pain, no fever  10/4/2022 Follow up for wound on the coccyx  Received patient, not in distress  Facility staff did not report any significant issues related to the wound  As per report, patient still on Bactrim for infection on the left groin  Patient will be seen by surgeon today at 7 pm     10/11/2022 Follow up for wound on the coccyx    Received patient, not in distress  Facility staff did not report any significant issues related to the wound  Denies pain  10/18/2022 Follow up for wound on the coccyx   Received patient, not in distress  Facility staff did not report any significant issues related to the wound  No new issues  Review of Systems   Constitutional: Negative  HENT: Negative  Eyes: Negative  Respiratory: Negative  Cardiovascular: Negative  Gastrointestinal: Negative  Endocrine: Negative  Genitourinary: Negative  Musculoskeletal: Positive for gait problem  Skin: Positive for wound  See HPI   Hematological: Negative  Psychiatric/Behavioral: Negative  All other systems reviewed and are negative  Objective:    Physical Exam  Constitutional:       Appearance: Normal appearance  HENT:      Head: Normocephalic and atraumatic  Nose: Nose normal       Mouth/Throat:      Mouth: Mucous membranes are moist    Eyes:      Conjunctiva/sclera: Conjunctivae normal    Pulmonary:      Effort: Pulmonary effort is normal    Abdominal:      Tenderness: There is no abdominal tenderness  Genitourinary:     Comments: continent  Musculoskeletal:      Cervical back: Normal range of motion  Comments: LROM   Skin:     Coloration: Pallor: xh       Findings: Lesion present  Comments: Coccyx - wound is healed   Neurological:      Mental Status: He is alert        Gait: Gait abnormal    Psychiatric:         Mood and Affect: Mood normal          Behavior: Behavior normal               Procedures           Patient's care was coordinated with nursing facility staff  Recent vitals, labs and updated medications were reviewed on EMR or chart system of facility  Past Medical, surgical, social, medication and allergy history and patient's previous records were reviewed and updated as appropriate: Most up-to date information is available in the facility EMR where the patient is currently admitted      Patient Active Problem List   Diagnosis   • Tremor, essential   • Other specified polyneuropathies   • Malignant neoplasm of prostate ([facility] [address] )   • Chronic pansinusitis   • Alcoholic cirrhosis of liver (HCC)   • COPD (chronic obstructive pulmonary disease) (HCC)   • Gout   • Hepatocellular carcinoma (HCC)   • Hypertension   • LIANA (obstructive sleep apnea)   • Pulmonary nodule   • Contracture of palmar fascia   • Constipation   • Diverticulitis   • Gastroesophageal reflux disease   • Impaired fasting glucose   • Lyme disease   • Tremor   • Tobacco dependence syndrome   • History of prostate cancer   • Thyroid nodule   • Synovial cyst of right popliteal space   • Hyperplasia of prostate   • Ventral hernia without obstruction or gangrene   • Diverticulosis   • Encounter for well adult exam with abnormal findings   • Leucopenia   • Spondylosis of lumbar region without myelopathy or radiculopathy   • Thrombocytopenia (HCC)   • Mild episode of recurrent major depressive disorder ([facility] [address] )   • Medicare annual wellness visit, subsequent   • Malignant neoplasm of upper lobe of right lung ([facility] [address] )   • Gross hematuria   • Non compliance w medication regimen   • Urothelial carcinoma of bladder ([facility] [address] )   • Severe protein-energy malnutrition ([facility] [address] )   • 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)   • Malignant neoplasm of lower lobe of right lung (HCC)   • Hematuria   • Alcohol dependence, daily use (HCC)   • Seborrheic dermatitis of scalp   • Tobacco abuse   • Fall   • Gait abnormality   • Hypothyroidism due to medication   • Closed fracture of second lumbar vertebra (HCC)   • Pressure injury of sacral region, stage 3 [facility])   • Surgical wound present   • COVID     Past Medical History:   Diagnosis Date   • Anemia     dur to blood loss in the past   • BMI 25 0-25 9,adult 5/24/2019   • BPH (benign prostatic hyperplasia) 2002   • 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[facility] [address] )    • Cancer (HCC)     hepatocellular, prostate   • Chronic bronchitis (HCC)    • Chronic bronchitis ([facility] [address] ) 7/19/2016    Last Assessment & Plan:  He has symptoms of chronic bronchitis  He would of course benefit from smoking cessation  I have recommended a trial of Spiriva     • Colitis    • COPD (chronic obstructive pulmonary disease) (HCC)     chronic bronchitis   • Decubitus ulcer of sacral region, stage 3 (HCC) 3/12/2018   • Diverticulitis    • Essential tremor    • ETOH abuse    • GERD (gastroesophageal reflux disease)    • Gout    • Hearing loss    • Hemorrhoids    • History of Lyme disease    • History of thrombocytopenia     chronic mild   • History of transfusion    • HTN (hypertension)    • Hydrocele    • Hyperlipidemia    • Hypertension    • Hyperthyroidism    • Irritable bowel syndrome    • Liver disease     cirrhosis   • Lyme disease    • Meningioma (HCC)    • Meningioma (HCC)     left frontal- followed by Neurosurgery   • Nicotine abuse     2-3 packs   • Obstructive sleep apnea     unable to tolerate CPAP   • Overweight with body mass index (BMI) of 25 to 25 9 in adult 11/3/2021   • Pressure injury of skin     Stage 3 that healed and has reoccurred   • Pulmonary nodule    • Sensory polyneuropathy    • Sleep apnea    • Spondylosis of lumbar region without myelopathy or radiculopathy 10/25/2019   • Thyroid nodule    • Tobacco abuse    • Tremor      Past Surgical History:   Procedure Laterality Date   • APPENDECTOMY     • CATARACT EXTRACTION      debra   • ESOPHAGOGASTRODUODENOSCOPY     • HAND SURGERY  04/30/2018   • LIVER SURGERY  03/09/2018   • DE REMV PILONIDAL LESION SIMPLE N/A 9/28/2018    Procedure: PILONIDAL CYSTECTOMY;  Surgeon: Dionne Rosario DO;  Location:  MAIN OR;  Service: General   • THYROID SURGERY     • TONSILLECTOMY       Social History     Socioeconomic History   • Marital status:      Spouse name: None   • Number of children: None   • Years of education: None   • Highest education level: None   Occupational History   • Occupation:    Tobacco Use   • Smoking status: Current Every Day Smoker     Packs/day: 1 00     Types: Cigarettes     Start date: 3/25/1961   • Smokeless tobacco: Never Used   • Tobacco comment: hx of nicotine abuse 2-3 packs   Vaping Use   • Vaping Use: Never used   Substance and Sexual Activity   • Alcohol use: Yes     Comment: 2-3 oz daily    • Drug use: No   • Sexual activity: Not Currently     Partners: Female   Other Topics Concern   • None   Social History Narrative    Hx of caffeine abuse    Hx of ETOH abuse     Social Determinants of Health     Financial Resource Strain: Not on file   Food Insecurity: Not on file   Transportation Needs: Not on file   Physical Activity: Not on file   Stress: Not on file   Social Connections: Not on file   Intimate Partner Violence: Not on file   Housing Stability: Not on file        Current Outpatient Medications:   •  albuterol (PROVENTIL HFA,VENTOLIN HFA) 90 mcg/act inhaler, INHALE 2 PUFFS BY MOUTH EVERY 6 HOURS AS NEEDED FOR WHEEZING, Disp: 18 g, Rfl: 2  •  budesonide-formoterol (Symbicort) 160-4 5 mcg/act inhaler, Inhale 2 puffs 2 (two) times a day Rinse mouth after use , Disp: 10 2 g, Rfl: 2  •  Cholecalciferol 1000 units CHEW, Chew 5,000 Units daily, Disp: , Rfl:   •  losartan (COZAAR) 50 mg tablet, Take 1 tablet (50 mg total) by mouth daily, Disp: 30 tablet, Rfl: 10  Family History   Problem Relation Age of Onset   • Diabetes Father    • Other Father         colon problems/colostomy   • Alcohol abuse Sister               Coordination of Care: Wound team aware of the treatment plan  Facility nurse will provide wound treatment and monitor the wound for any changes  Patient / Staff education : Patient / Staff was given education on sign of infection and pressure ulcer prevention  Patient/ Staff verbalized understanding     Follow up :  Sign off    Voice-recognition software may have been used in the preparation of this document  Occasional wrong word or "sound-alike" substitutions may have occurred due to the inherent limitations of voice recognition software  Interpretation should be guided by context        Al Canas

## 2022-10-25 ENCOUNTER — NURSING HOME VISIT (OUTPATIENT)
Dept: WOUND CARE | Facility: HOSPITAL | Age: 82
End: 2022-10-25

## 2022-10-25 DIAGNOSIS — T14.8XXA SURGICAL WOUND PRESENT: Primary | ICD-10-CM

## 2022-10-25 NOTE — PROGRESS NOTES
[address] [facility]       Patient ID: Earnest Pelaez is a [de-identified] y o  male Date of Birth 1940     Location of Service: [facility]     Performed wound round with: Wound team     Chief Complaint :  Left and right groin, and left lower leg    Wound Instructions:  Wound:  Coccyx  Discontinue previous wound order  Cleanse the skin with soap and water, pat dry  Apply zinc oxide to wound bed  Frequency : twice a day and prn for soiling    Location : Left groin  Cleanse with normal saline solution  Apply Betadine to wound bed  Daily and p r n  for soiling    Location:  Left lower leg  Cleansed with normal saline solution  Apply silver alginate to wound bed and covered with ABD and wrap with Kerlix  3 times a week and p r n  for soiling    Offload all wounds  Turn and reposition frequently  Increase protein intake  Monitor for any sign of infection or worsening, inform PCP or patient's primary physician in your facility  Allergies  Pneumococcal vaccine      Assessment & Plan:  1  Surgical wound present  Assessment & Plan:  History :  - LEFT FEMORAL ARTERY THROMBECTOMY, LEFT LOWER EXTREMITY THROMBOEMBOLECTOMY AND FASCIOTOMIES, RIGHT TO LEFT FEMORAL-FEMORAL BYPASS on 9/11/2022    - recently seen by surgeon, discontinue the suture on the groin  - the wound on the right groin is healed, the wound on the left groin is covered with eschar, with no obvious sign of infection  Ordered Betadine for local wound care  - the wound on the left lower leg medial is 100% granulation, 10 5 x 1 cm, with no obvious sign of infection  Ordered silver alginate  - the wound on the left lower leg lateral is 100% hypergranulation, 9 x 2 cm, with no obvious sign of infection, ordered silver alginate  - followup next week             Subjective:   9/20/2022This is a [de-identified] y o , male referred to our service for wound/ skin alterations on coccyx and left lower leg  Patient have a complex medical history including but not limited to  Cancer [facility]), COPD (chronic obstructive pulmonary disease) ([facility] [address] ), Decubitus ulcer of sacral region, stage 3 ([facility] [address] ), Diverticulitis, ETOH abuse and Nicotine abuse   Patient was referred by Senior Care Team  Patient was seen in collaboration with the facility wound team      Wound History:   As per medical record review, patient had critical limb ischemia and had LEFT FEMORAL ARTERY THROMBECTOMY, LEFT LOWER EXTREMITY THROMBOEMBOLECTOMY AND FASCIOTOMIES, RIGHT TO LEFT FEMORAL-FEMORAL BYPASS on 9/11/2022  The wound on the coccyx is chronic  Received patient in bed, seems comfortable  Denies pain  Current treatment on wound is dry sterile dressing  Patient currently on isoloation for COVID     9/27/2022 Follow up for wound on the buttocks and left groin  Received patient, not in distress  Facility staff did not report any significant issues related to the wound  Denies pain, no fever  10/4/2022 Follow up for wound on the coccyx  Received patient, not in distress  Facility staff did not report any significant issues related to the wound  As per report, patient still on Bactrim for infection on the left groin  Patient will be seen by surgeon today at 7 pm     10/11/2022 Follow up for wound on the coccyx    Received patient, not in distress  Facility staff did not report any significant issues related to the wound  Denies pain  10/18/2022 Follow up for wound on the coccyx   Received patient, not in distress  Facility staff did not report any significant issues related to the wound  No new issues  10/25/2022 Follow up for wound on the groin and left lower leg   Received patient, not in distress  Facility staff did not report any significant issues related to the wound  As per report, patient was recently seen by surgeon and suture was removed  Review of Systems   Constitutional: Negative  HENT: Negative  Eyes: Negative  Respiratory: Negative  Cardiovascular: Negative  Gastrointestinal: Negative  Endocrine: Negative  Genitourinary: Negative  Musculoskeletal: Positive for gait problem  Skin: Positive for wound  See HPI   Hematological: Negative  Psychiatric/Behavioral: Negative  All other systems reviewed and are negative  Objective:    Physical Exam  Constitutional:       Appearance: Normal appearance  HENT:      Head: Normocephalic and atraumatic  Nose: Nose normal       Mouth/Throat:      Mouth: Mucous membranes are moist    Eyes:      Conjunctiva/sclera: Conjunctivae normal    Pulmonary:      Effort: Pulmonary effort is normal    Abdominal:      Tenderness: There is no abdominal tenderness  Genitourinary:     Comments: continent  Musculoskeletal:      Cervical back: Normal range of motion  Comments: LROM   Skin:     Coloration: Pallor: xh       Findings: Lesion present  Comments: Right groin - surgical incision healed  Left groin - wound size is 5 x 0 7 x 0 1 cm  , 100% eschar, no drainage, periwound is normal, no obvious sign of infection  Left lower leg medial - wound size is 10 5 x 1 x 0 1 cm  , 100% granulation, small amount of serous drainage, periwound is normal, no malodor, no obvious sign of infection    Left lower leg lateral - wound size is 9 x 2 x 0 1 cm  , 100% hypergranulation, with moderate amount of serous drainage, periwound is normal no malodor, no obvious sign of infection   Neurological:      Mental Status: He is alert  Gait: Gait abnormal    Psychiatric:         Mood and Affect: Mood normal          Behavior: Behavior normal               Procedures           Patient's care was coordinated with nursing facility staff  Recent vitals, labs and updated medications were reviewed on EMR or chart system of facility   Past Medical, surgical, social, medication and allergy history and patient's previous records were reviewed and updated as appropriate: Most up-to date information is available in the facility EMR where the patient is currently admitted      Patient Active Problem List   Diagnosis   • Tremor, essential   • Other specified polyneuropathies   • Malignant neoplasm of prostate ([place] [address] )   • Chronic pansinusitis   • Alcoholic cirrhosis of liver (HCC)   • COPD (chronic obstructive pulmonary disease) (HCC)   • Gout   • Hepatocellular carcinoma (HCC)   • Hypertension   • LIANA (obstructive sleep apnea)   • Pulmonary nodule   • Contracture of palmar fascia   • Constipation   • Diverticulitis   • Gastroesophageal reflux disease   • Impaired fasting glucose   • Lyme disease   • Tremor   • Tobacco dependence syndrome   • History of prostate cancer   • Thyroid nodule   • Synovial cyst of right popliteal space   • Hyperplasia of prostate   • Ventral hernia without obstruction or gangrene   • Diverticulosis   • Encounter for well adult exam with abnormal findings   • Leucopenia   • Spondylosis of lumbar region without myelopathy or radiculopathy   • Thrombocytopenia (HCC)   • Mild episode of recurrent major depressive disorder ([place] [address] )   • Medicare annual wellness visit, subsequent   • Malignant neoplasm of upper lobe of right lung ([place] [address] )   • Gross hematuria   • Non compliance w medication regimen   • Urothelial carcinoma of bladder ([place] [address] )   • Severe protein-energy malnutrition (HCC)   • 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)   • Malignant neoplasm of lower lobe of right lung (HCC)   • Hematuria   • Alcohol dependence, daily use (HCC)   • Seborrheic dermatitis of scalp   • Tobacco abuse   • Fall   • Gait abnormality   • Hypothyroidism due to medication   • Closed fracture of second lumbar vertebra (HCC)   • Pressure injury of sacral region, stage 3 ([place] [address] )   • Surgical wound present   • COVID     Past Medical History:   Diagnosis Date   • Anemia     dur to blood loss in the past   • BMI 25 0-25 9,adult 5/24/2019   • BPH (benign prostatic hyperplasia) 2002   • 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[place] [address] )    • Cancer (HCC)     hepatocellular, prostate   • Chronic bronchitis (HCC)    • Chronic bronchitis ([facility] [address] ) 7/19/2016    Last Assessment & Plan:  He has symptoms of chronic bronchitis  He would of course benefit from smoking cessation  I have recommended a trial of Spiriva  • Colitis    • COPD (chronic obstructive pulmonary disease) (HCC)     chronic bronchitis   • Decubitus ulcer of sacral region, stage 3 (HCC) 3/12/2018   • Diverticulitis    • Essential tremor    • ETOH abuse    • GERD (gastroesophageal reflux disease)    • Gout    • Hearing loss    • Hemorrhoids    • History of Lyme disease    • History of thrombocytopenia     chronic mild   • History of transfusion    • HTN (hypertension)    • Hydrocele    • Hyperlipidemia    • Hypertension    • Hyperthyroidism    • Irritable bowel syndrome    • Liver disease     cirrhosis   • Lyme disease    • Meningioma (HCC)    • Meningioma (HCC)     left frontal- followed by Neurosurgery   • Nicotine abuse     2-3 packs   • Obstructive sleep apnea     unable to tolerate CPAP   • Overweight with body mass index (BMI) of 25 to 25 9 in adult 11/3/2021   • Pressure injury of skin     Stage 3 that healed and has reoccurred   • Pulmonary nodule    • Sensory polyneuropathy    • Sleep apnea    • Spondylosis of lumbar region without myelopathy or radiculopathy 10/25/2019   • Thyroid nodule    • Tobacco abuse    • Tremor      Past Surgical History:   Procedure Laterality Date   • APPENDECTOMY     • CATARACT EXTRACTION      debra   • ESOPHAGOGASTRODUODENOSCOPY     • HAND SURGERY  04/30/2018   • LIVER SURGERY  03/09/2018   • KS REMV PILONIDAL LESION SIMPLE N/A 9/28/2018    Procedure: PILONIDAL CYSTECTOMY;  Surgeon: Emeka Joya DO;  Location: [address] [place] [address];  Service: General   • THYROID SURGERY     • TONSILLECTOMY       Social History     Socioeconomic History   • Marital status:       Spouse name: None   • Number of children: None   • Years of education: None   • Highest education level: None   Occupational History   • Occupation:    Tobacco Use   • Smoking status: Current Every Day Smoker     Packs/day: 1 00     Types: Cigarettes     Start date: 3/25/1961   • Smokeless tobacco: Never Used   • Tobacco comment: hx of nicotine abuse 2-3 packs   Vaping Use   • Vaping Use: Never used   Substance and Sexual Activity   • Alcohol use: Yes     Comment: 2-3 oz daily    • Drug use: No   • Sexual activity: Not Currently     Partners: Female   Other Topics Concern   • None   Social History Narrative    Hx of caffeine abuse    Hx of ETOH abuse     Social Determinants of Health     Financial Resource Strain: Not on file   Food Insecurity: Not on file   Transportation Needs: Not on file   Physical Activity: Not on file   Stress: Not on file   Social Connections: Not on file   Intimate Partner Violence: Not on file   Housing Stability: Not on file        Current Outpatient Medications:   •  albuterol (PROVENTIL HFA,VENTOLIN HFA) 90 mcg/act inhaler, INHALE 2 PUFFS BY MOUTH EVERY 6 HOURS AS NEEDED FOR WHEEZING, Disp: 18 g, Rfl: 2  •  budesonide-formoterol (Symbicort) 160-4 5 mcg/act inhaler, Inhale 2 puffs 2 (two) times a day Rinse mouth after use , Disp: 10 2 g, Rfl: 2  •  Cholecalciferol 1000 units CHEW, Chew 5,000 Units daily, Disp: , Rfl:   •  losartan (COZAAR) 50 mg tablet, Take 1 tablet (50 mg total) by mouth daily, Disp: 30 tablet, Rfl: 10  Family History   Problem Relation Age of Onset   • Diabetes Father    • Other Father         colon problems/colostomy   • Alcohol abuse Sister               Coordination of Care: Wound team aware of the treatment plan  Facility nurse will provide wound treatment and monitor the wound for any changes  Patient / Staff education : Patient / Staff was given education on sign of infection and pressure ulcer prevention  Patient/ Staff verbalized understanding     Follow up :  Next week    Voice-recognition software may have been used in the preparation of this document   Occasional wrong word or "sound-alike" substitutions may have occurred due to the inherent limitations of voice recognition software  Interpretation should be guided by context        [place]

## 2022-10-25 NOTE — ASSESSMENT & PLAN NOTE
History :  - LEFT FEMORAL ARTERY THROMBECTOMY, LEFT LOWER EXTREMITY THROMBOEMBOLECTOMY AND FASCIOTOMIES, RIGHT TO LEFT FEMORAL-FEMORAL BYPASS on 9/11/2022    - recently seen by surgeon, discontinue the suture on the groin  - the wound on the right groin is healed, the wound on the left groin is covered with eschar, with no obvious sign of infection  Ordered Betadine for local wound care  - the wound on the left lower leg medial is 100% granulation, 10 5 x 1 cm, with no obvious sign of infection  Ordered silver alginate    - the wound on the left lower leg lateral is 100% hypergranulation, 9 x 2 cm, with no obvious sign of infection, ordered silver alginate  - followup next week

## 2022-11-01 ENCOUNTER — NURSING HOME VISIT (OUTPATIENT)
Dept: WOUND CARE | Facility: HOSPITAL | Age: 82
End: 2022-11-01

## 2022-11-01 DIAGNOSIS — T14.8XXA SURGICAL WOUND PRESENT: Primary | ICD-10-CM

## 2022-11-01 NOTE — PROGRESS NOTES
[address] [facility]       Patient ID: Elsa Kay is a [de-identified] y o  male Date of Birth 1940     Location of Service: [facility]     Performed wound round with: Wound team     Chief Complaint :  Left and right groin, and left lower leg    Wound Instructions:  Wound:  Coccyx  Discontinue previous wound order  Cleanse the skin with soap and water, pat dry  Apply zinc oxide to wound bed  Frequency : twice a day and prn for soiling    Location : Left groin  Cleanse with normal saline solution  Apply Betadine to wound bed  Daily and p r n  for soiling    Location:  Left lower leg  Cleansed with normal saline solution  Apply collagen dressing to wound bed and covered with ABD and wrap with Kerlix  3 times a week and p r n  for soiling    Offload all wounds  Turn and reposition frequently  Increase protein intake  Monitor for any sign of infection or worsening, inform PCP or patient's primary physician in your facility  Allergies  Pneumococcal vaccine      Assessment & Plan:  1  Surgical wound present  Assessment & Plan:  History :  - LEFT FEMORAL ARTERY THROMBECTOMY, LEFT LOWER EXTREMITY THROMBOEMBOLECTOMY AND FASCIOTOMIES, RIGHT TO LEFT FEMORAL-FEMORAL BYPASS on 9/11/2022  - the wound on the left groin is covered with eschar, with no obvious sign of infection  Ordered Betadine for local wound care  - the wound on the left lower leg medial decrease in size, change treatment to collagen  - the wound on the left lower leg lateral is 50% hypergranulation, applied silver nitrate  Change local wound care to collagen  - patient currently on protein supplement, HiCal and Prosource  - followup next week             Subjective:   9/20/2022This is a [de-identified] y o , male referred to our service for wound/ skin alterations on coccyx and left lower leg  Patient have a complex medical history including but not limited to  Cancer [facility]), COPD (chronic obstructive pulmonary disease) ([facility] [address] ), Decubitus ulcer of sacral region, stage 3 ([facility] [address] ), Diverticulitis, ETOH abuse and Nicotine abuse   Patient was referred by Senior Care Team  Patient was seen in collaboration with the facility wound team      Wound History:   As per medical record review, patient had critical limb ischemia and had LEFT FEMORAL ARTERY THROMBECTOMY, LEFT LOWER EXTREMITY THROMBOEMBOLECTOMY AND FASCIOTOMIES, RIGHT TO LEFT FEMORAL-FEMORAL BYPASS on 9/11/2022  The wound on the coccyx is chronic  Received patient in bed, seems comfortable  Denies pain  Current treatment on wound is dry sterile dressing  Patient currently on isoloation for COVID     9/27/2022 Follow up for wound on the buttocks and left groin  Received patient, not in distress  Facility staff did not report any significant issues related to the wound  Denies pain, no fever  10/4/2022 Follow up for wound on the coccyx  Received patient, not in distress  Facility staff did not report any significant issues related to the wound  As per report, patient still on Bactrim for infection on the left groin  Patient will be seen by surgeon today at 7 pm     10/11/2022 Follow up for wound on the coccyx    Received patient, not in distress  Facility staff did not report any significant issues related to the wound  Denies pain  10/18/2022 Follow up for wound on the coccyx   Received patient, not in distress  Facility staff did not report any significant issues related to the wound  No new issues  10/25/2022 Follow up for wound on the groin and left lower leg   Received patient, not in distress  Facility staff did not report any significant issues related to the wound  As per report, patient was recently seen by surgeon and suture was removed  11/1/2022 Follow up for wound on the left groin, left lower leg   Received patient, not in distress  Facility staff did not report any significant issues related to the wound    Denies pain                       Review of Systems   Constitutional: Negative  HENT: Negative  Eyes: Negative  Respiratory: Negative  Cardiovascular: Negative  Gastrointestinal: Negative  Endocrine: Negative  Genitourinary: Negative  Musculoskeletal: Positive for gait problem  Skin: Positive for wound  See HPI   Hematological: Negative  Psychiatric/Behavioral: Negative  All other systems reviewed and are negative  Objective:    Physical Exam  Constitutional:       Appearance: Normal appearance  HENT:      Head: Normocephalic and atraumatic  Nose: Nose normal       Mouth/Throat:      Mouth: Mucous membranes are moist    Eyes:      Conjunctiva/sclera: Conjunctivae normal    Pulmonary:      Effort: Pulmonary effort is normal    Abdominal:      Tenderness: There is no abdominal tenderness  Genitourinary:     Comments: continent  Musculoskeletal:      Cervical back: Normal range of motion  Comments: LROM   Skin:     Coloration: Pallor: xh       Findings: Lesion present  Comments: Right groin - surgical incision healed  Left groin - wound size is 0 5 x 5 cm, , 100% eschar, no drainage, periwound is normal, no obvious sign of infection  Left lower leg medial - wound size is 10 x 1 4 x 0 1 cm  , 100% granulation, small amount of serous drainage, periwound is normal, no malodor, no obvious sign of infection    Left lower leg lateral - wound size is 8 5 x 1 5 x 0 1 , 50% hypergranulation, 50% granulation, with small amount of serous drainage, periwound is normal no malodor, no obvious sign of infection   Neurological:      Mental Status: He is alert        Gait: Gait abnormal    Psychiatric:         Mood and Affect: Mood normal          Behavior: Behavior normal                     Chemical Caut Of A Wound     Date/Time 11/1/2022 8:10 AM     Performed by  BRYANT Hampton     Authorized by BRYANT Hampton      Universal Protocol   Consent: Verbal consent obtained  Risks and benefits: risks, benefits and alternatives were discussed  Consent given by: patient  Time out: Immediately prior to procedure a "time out" was called to verify the correct patient, procedure, equipment, support staff and site/side marked as required  Timeout called at: 11/1/2022 8:10 AM   Patient understanding: patient states understanding of the procedure being performed  Patient identity confirmed: verbally with patient       Procedure Details   Procedure Notes: Cauterize hypergranulation area in the left lower leg lateral  Patient tolerance: patient tolerated the procedure well with no immediate complications                    Patient's care was coordinated with nursing facility staff  Recent vitals, labs and updated medications were reviewed on EMR or chart system of facility  Past Medical, surgical, social, medication and allergy history and patient's previous records were reviewed and updated as appropriate: Most up-to date information is available in the facility EMR where the patient is currently admitted      Patient Active Problem List   Diagnosis   • Tremor, essential   • Other specified polyneuropathies   • Malignant neoplasm of prostate ([facility] [address] )   • Chronic pansinusitis   • Alcoholic cirrhosis of liver (HCC)   • COPD (chronic obstructive pulmonary disease) (HCC)   • Gout   • Hepatocellular carcinoma (HCC)   • Hypertension   • LIANA (obstructive sleep apnea)   • Pulmonary nodule   • Contracture of palmar fascia   • Constipation   • Diverticulitis   • Gastroesophageal reflux disease   • Impaired fasting glucose   • Lyme disease   • Tremor   • Tobacco dependence syndrome   • History of prostate cancer   • Thyroid nodule   • Synovial cyst of right popliteal space   • Hyperplasia of prostate   • Ventral hernia without obstruction or gangrene   • Diverticulosis   • Encounter for well adult exam with abnormal findings   • Leucopenia   • Spondylosis of lumbar region without myelopathy or radiculopathy   • Thrombocytopenia (HCC)   • Mild episode of recurrent major depressive disorder ([facility] [address] )   • Medicare annual wellness visit, subsequent   • Malignant neoplasm of upper lobe of right lung (HCC)   • Gross hematuria   • Non compliance w medication regimen   • Urothelial carcinoma of bladder (HCC)   • Severe protein-energy malnutrition (HCC)   • 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 [facility])   • Malignant neoplasm of lower lobe of right lung (HCC)   • Hematuria   • Alcohol dependence, daily use (HCC)   • Seborrheic dermatitis of scalp   • Tobacco abuse   • Fall   • Gait abnormality   • Hypothyroidism due to medication   • Closed fracture of second lumbar vertebra (HCC)   • Pressure injury of sacral region, stage 3 ([facility] [address] )   • Surgical wound present   • COVID     Past Medical History:   Diagnosis Date   • Anemia     dur to blood loss in the past   • BMI 25 0-25 9,adult 5/24/2019   • BPH (benign prostatic hyperplasia) 2002   • 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 )    • Cancer (HCC)     hepatocellular, prostate   • Chronic bronchitis (HCC)    • Chronic bronchitis ([place] [address] ) 7/19/2016    Last Assessment & Plan:  He has symptoms of chronic bronchitis  He would of course benefit from smoking cessation  I have recommended a trial of Spiriva     • Colitis    • COPD (chronic obstructive pulmonary disease) (HCC)     chronic bronchitis   • Decubitus ulcer of sacral region, stage 3 ([facility] [address] ) 3/12/2018   • Diverticulitis    • Essential tremor    • ETOH abuse    • GERD (gastroesophageal reflux disease)    • Gout    • Hearing loss    • Hemorrhoids    • History of Lyme disease    • History of thrombocytopenia     chronic mild   • History of transfusion    • HTN (hypertension)    • Hydrocele    • Hyperlipidemia    • Hypertension    • Hyperthyroidism    • Irritable bowel syndrome    • Liver disease     cirrhosis   • Lyme disease    • Meningioma (HCC)    • Meningioma (HCC)     left frontal- followed by Neurosurgery   • Nicotine abuse     2-3 packs   • Obstructive sleep apnea unable to tolerate CPAP   • Overweight with body mass index (BMI) of 25 to 25 9 in adult 11/3/2021   • Pressure injury of skin     Stage 3 that healed and has reoccurred   • Pulmonary nodule    • Sensory polyneuropathy    • Sleep apnea    • Spondylosis of lumbar region without myelopathy or radiculopathy 10/25/2019   • Thyroid nodule    • Tobacco abuse    • Tremor      Past Surgical History:   Procedure Laterality Date   • APPENDECTOMY     • CATARACT EXTRACTION      debra   • ESOPHAGOGASTRODUODENOSCOPY     • HAND SURGERY  04/30/2018   • LIVER SURGERY  03/09/2018   • NE REMV PILONIDAL LESION SIMPLE N/A 9/28/2018    Procedure: PILONIDAL CYSTECTOMY;  Surgeon: Jet Gutierres DO;  Location: [address] [place] [address];  Service: General   • THYROID SURGERY     • TONSILLECTOMY       Social History     Socioeconomic History   • Marital status:       Spouse name: None   • Number of children: None   • Years of education: None   • Highest education level: None   Occupational History   • Occupation:    Tobacco Use   • Smoking status: Current Every Day Smoker     Packs/day: 1 00     Types: Cigarettes     Start date: 3/25/1961   • Smokeless tobacco: Never Used   • Tobacco comment: hx of nicotine abuse 2-3 packs   Vaping Use   • Vaping Use: Never used   Substance and Sexual Activity   • Alcohol use: Yes     Comment: 2-3 oz daily    • Drug use: No   • Sexual activity: Not Currently     Partners: Female   Other Topics Concern   • None   Social History Narrative    Hx of caffeine abuse    Hx of ETOH abuse     Social Determinants of Health     Financial Resource Strain: Not on file   Food Insecurity: Not on file   Transportation Needs: Not on file   Physical Activity: Not on file   Stress: Not on file   Social Connections: Not on file   Intimate Partner Violence: Not on file   Housing Stability: Not on file        Current Outpatient Medications:   •  albuterol (PROVENTIL HFA,VENTOLIN HFA) 90 mcg/act inhaler, INHALE 2 PUFFS BY MOUTH EVERY 6 HOURS AS NEEDED FOR WHEEZING, Disp: 18 g, Rfl: 2  •  budesonide-formoterol (Symbicort) 160-4 5 mcg/act inhaler, Inhale 2 puffs 2 (two) times a day Rinse mouth after use , Disp: 10 2 g, Rfl: 2  •  Cholecalciferol 1000 units CHEW, Chew 5,000 Units daily, Disp: , Rfl:   •  losartan (COZAAR) 50 mg tablet, Take 1 tablet (50 mg total) by mouth daily, Disp: 30 tablet, Rfl: 10  Family History   Problem Relation Age of Onset   • Diabetes Father    • Other Father         colon problems/colostomy   • Alcohol abuse Sister               Coordination of Care: Wound team aware of the treatment plan  Facility nurse will provide wound treatment and monitor the wound for any changes  Patient / Staff education : Patient / Staff was given education on sign of infection and pressure ulcer prevention  Patient/ Staff verbalized understanding     Follow up :  Next week    Voice-recognition software may have been used in the preparation of this document  Occasional wrong word or "sound-alike" substitutions may have occurred due to the inherent limitations of voice recognition software  Interpretation should be guided by burke Fitzgerald

## 2022-11-02 NOTE — ASSESSMENT & PLAN NOTE
History :  - LEFT FEMORAL ARTERY THROMBECTOMY, LEFT LOWER EXTREMITY THROMBOEMBOLECTOMY AND FASCIOTOMIES, RIGHT TO LEFT FEMORAL-FEMORAL BYPASS on 9/11/2022  - the wound on the left groin is covered with eschar, with no obvious sign of infection  Ordered Betadine for local wound care  - the wound on the left lower leg medial decrease in size, change treatment to collagen  - the wound on the left lower leg lateral is 50% hypergranulation, applied silver nitrate    Change local wound care to collagen  - patient currently on protein supplement, HiCal and Prosource  - followup next week

## 2022-11-08 ENCOUNTER — NURSING HOME VISIT (OUTPATIENT)
Dept: WOUND CARE | Facility: HOSPITAL | Age: 82
End: 2022-11-08

## 2022-11-08 DIAGNOSIS — T14.8XXA SURGICAL WOUND PRESENT: Primary | ICD-10-CM

## 2022-11-08 NOTE — PROGRESS NOTES
[address] [facility]       Patient ID: Ebony Aguirre is a [de-identified] y o  male Date of Birth 1940     [facility] of [facility]: [facility]    Performed wound round with: Wound team     Chief Complaint :  Left and right groin, and left lower leg    Wound Instructions:  Wound:  Coccyx  Discontinue previous wound order  Cleanse the skin with soap and water, pat dry  Apply zinc oxide to wound bed  Frequency : twice a day and prn for soiling    Location : Left groin  Cleanse with normal saline solution  Apply Betadine to wound bed  Daily and p r n  for soiling    Location:  Left lower leg  Cleansed with normal saline solution  Apply collagen dressing ( xerofoam if not available) to wound bed and covered with ABD and wrap with Kerlix  3 times a week and p r n  for soiling    Offload all wounds  Turn and reposition frequently  Increase protein intake  Monitor for any sign of infection or worsening, inform PCP or patient's primary physician in your facility  Allergies  Pneumococcal vaccine      Assessment & Plan:  1  Surgical wound present  -     Debridement  -     Debridement             Subjective:   9/20/2022This is a [de-identified] y o , male referred to our service for wound/ skin alterations on coccyx and left lower leg  Patient have a complex medical history including but not limited to  Cancer [facility]), COPD (chronic obstructive pulmonary disease) ([facility] [address] ), Decubitus ulcer of sacral region, stage 3 ([facility] [address] ), Diverticulitis, ETOH abuse and Nicotine abuse   Patient was referred by Senior Care Team  Patient was seen in collaboration with the facility wound team      Wound History:   As per medical record review, patient had critical limb ischemia and had LEFT FEMORAL ARTERY THROMBECTOMY, LEFT LOWER EXTREMITY THROMBOEMBOLECTOMY AND FASCIOTOMIES, RIGHT TO LEFT FEMORAL-FEMORAL BYPASS on 9/11/2022  The wound on the coccyx is chronic  Received patient in bed, seems comfortable  Denies pain  Current treatment on wound is dry sterile dressing  Patient currently on isoloation for COVID     9/27/2022 Follow up for wound on the buttocks and left groin  Received patient, not in distress  Facility staff did not report any significant issues related to the wound  Denies pain, no fever  10/4/2022 Follow up for wound on the coccyx  Received patient, not in distress  Facility staff did not report any significant issues related to the wound  As per report, patient still on Bactrim for infection on the left groin  Patient will be seen by surgeon today at 7 pm     10/11/2022 Follow up for wound on the coccyx    Received patient, not in distress  Facility staff did not report any significant issues related to the wound  Denies pain  10/18/2022 Follow up for wound on the coccyx   Received patient, not in distress  Facility staff did not report any significant issues related to the wound  No new issues  10/25/2022 Follow up for wound on the groin and left lower leg   Received patient, not in distress  Facility staff did not report any significant issues related to the wound  As per report, patient was recently seen by surgeon and suture was removed  11/1/2022 Follow up for wound on the left groin, left lower leg   Received patient, not in distress  Facility staff did not report any significant issues related to the wound  Denies pain  11/8/2022 Follow up for wound on the left groin and left lower leg   Received patient, not in distress  Facility staff did not report any significant issues related to the wound  As per report, the collagen dressing still not available  Patient denies pain  Review of Systems   Constitutional: Negative  HENT: Negative  Eyes: Negative  Respiratory: Negative  Cardiovascular: Negative  Gastrointestinal: Negative  Endocrine: Negative  Genitourinary: Negative  Musculoskeletal: Positive for gait problem     Skin: Positive for wound  See HPI   Hematological: Negative  Psychiatric/Behavioral: Negative  All other systems reviewed and are negative  Objective:    Physical Exam  Constitutional:       Appearance: Normal appearance  HENT:      Head: Normocephalic and atraumatic  Nose: Nose normal       Mouth/Throat:      Mouth: Mucous membranes are moist    Eyes:      Conjunctiva/sclera: Conjunctivae normal    Pulmonary:      Effort: Pulmonary effort is normal    Abdominal:      Tenderness: There is no abdominal tenderness  Genitourinary:     Comments: continent  Musculoskeletal:      Cervical back: Normal range of motion  Comments: LROM   Skin:     Coloration: Pallor: xh       Findings: Lesion present  Comments: Right groin - surgical incision healed  Left groin - wound size is 0 5 x 5 cm, , 100% eschar, no drainage, periwound is normal, no obvious sign of infection  Left lower leg medial - wound size is 9 x 1 x 0 1 cm  , 100% granulation, small amount of serous drainage, periwound is normal, no malodor, no obvious sign of infection    Left lower leg lateral - wound size is 7 3 x 1 3 x 0 1 , 50% hypergranulation, 50% granulation, with small amount of serous drainage, periwound is normal no malodor, no obvious sign of infection   Neurological:      Mental Status: He is alert  Gait: Gait abnormal    Psychiatric:         Mood and Affect: Mood normal          Behavior: Behavior normal               Debridement   Universal Protocol:  Consent: Verbal consent obtained  Risks and benefits: risks, benefits and alternatives were discussed  Consent given by: patient  Time out: Immediately prior to procedure a "time out" was called to verify the correct patient, procedure, equipment, support staff and site/side marked as required    Timeout called at: 11/8/2022 8:10 AM   Patient understanding: patient states understanding of the procedure being performed  Patient identity confirmed: verbally with patient      Performed by: NP (Left lower leg medial)  Debridement type: selective  Pain control: none  Post-debridement measurements  Length (cm): 9  Width (cm): 1  Depth (cm): 0 1  Percent debrided: 100%  Surface Area (cm^2): 9  Area debrided (cm^2): 9  Volume (cm^3): 0 9  Devitalized tissue debrided: biofilm, fibrin and necrotic debris  Instrument(s) utilized: blade  Bleeding: small  Hemostasis obtained with: pressure  Procedural pain (0-10): 0  Post-procedural pain: 0   Response to treatment: procedure was tolerated well    Debridement   Universal Protocol:  Consent: Verbal consent obtained  Risks and benefits: risks, benefits and alternatives were discussed  Consent given by: patient  Time out: Immediately prior to procedure a "time out" was called to verify the correct patient, procedure, equipment, support staff and site/side marked as required  Timeout called at: 11/8/2022 8:10 AM   Patient understanding: patient states understanding of the procedure being performed  Patient identity confirmed: verbally with patient      Performed by: NP (Left lower leg lateral)  Debridement type: selective  Pain control: none  Post-debridement measurements  Length (cm): 7 3  Width (cm): 1 3  Depth (cm): 0 1  Percent debrided: 100%  Surface Area (cm^2): 9 49  Area debrided (cm^2): 9 49  Volume (cm^3): 0 95  Devitalized tissue debrided: biofilm, fibrin and necrotic debris  Instrument(s) utilized: blade  Bleeding: small  Hemostasis obtained with: pressure  Procedural pain (0-10): 0  Post-procedural pain: 0   Response to treatment: procedure was tolerated well                 Patient's care was coordinated with nursing facility staff  Recent vitals, labs and updated medications were reviewed on EMR or chart system of facility   Past Medical, surgical, social, medication and allergy history and patient's previous records were reviewed and updated as appropriate: Most up-to date information is available in the facility EMR where the patient is currently admitted      Patient Active Problem List   Diagnosis   • Tremor, essential   • Other specified polyneuropathies   • Malignant neoplasm of prostate ([place] [address] )   • Chronic pansinusitis   • Alcoholic cirrhosis of liver (HCC)   • COPD (chronic obstructive pulmonary disease) (HCC)   • Gout   • Hepatocellular carcinoma (HCC)   • Hypertension   • LIANA (obstructive sleep apnea)   • Pulmonary nodule   • Contracture of palmar fascia   • Constipation   • Diverticulitis   • Gastroesophageal reflux disease   • Impaired fasting glucose   • Lyme disease   • Tremor   • Tobacco dependence syndrome   • History of prostate cancer   • Thyroid nodule   • Synovial cyst of right popliteal space   • Hyperplasia of prostate   • Ventral hernia without obstruction or gangrene   • Diverticulosis   • Encounter for well adult exam with abnormal findings   • Leucopenia   • Spondylosis of lumbar region without myelopathy or radiculopathy   • Thrombocytopenia (HCC)   • Mild episode of recurrent major depressive disorder ([place] [address] )   • Medicare annual wellness visit, subsequent   • Malignant neoplasm of upper lobe of right lung ([place] [address] )   • Gross hematuria   • Non compliance w medication regimen   • Urothelial carcinoma of bladder ([place] [address] )   • Severe protein-energy malnutrition (HCC)   • 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)   • Malignant neoplasm of lower lobe of right lung (HCC)   • Hematuria   • Alcohol dependence, daily use (HCC)   • Seborrheic dermatitis of scalp   • Tobacco abuse   • Fall   • Gait abnormality   • Hypothyroidism due to medication   • Closed fracture of second lumbar vertebra (HCC)   • Pressure injury of sacral region, stage 3 ([place] [address] )   • Surgical wound present   • COVID     Past Medical History:   Diagnosis Date   • Anemia     dur to blood loss in the past   • BMI 25 0-25 9,adult 5/24/2019   • BPH (benign prostatic hyperplasia) 2002   • 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[place] [address] )    • Cancer (HCC)     hepatocellular, prostate   • Chronic bronchitis ([place] [address] ) • Chronic bronchitis ([facility] [address] ) 7/19/2016    Last Assessment & Plan:  He has symptoms of chronic bronchitis  He would of course benefit from smoking cessation  I have recommended a trial of Spiriva  • Colitis    • COPD (chronic obstructive pulmonary disease) (HCC)     chronic bronchitis   • Decubitus ulcer of sacral region, stage 3 (HCC) 3/12/2018   • Diverticulitis    • Essential tremor    • ETOH abuse    • GERD (gastroesophageal reflux disease)    • Gout    • Hearing loss    • Hemorrhoids    • History of Lyme disease    • History of thrombocytopenia     chronic mild   • History of transfusion    • HTN (hypertension)    • Hydrocele    • Hyperlipidemia    • Hypertension    • Hyperthyroidism    • Irritable bowel syndrome    • Liver disease     cirrhosis   • Lyme disease    • Meningioma (HCC)    • Meningioma (HCC)     left frontal- followed by Neurosurgery   • Nicotine abuse     2-3 packs   • Obstructive sleep apnea     unable to tolerate CPAP   • Overweight with body mass index (BMI) of 25 to 25 9 in adult 11/3/2021   • Pressure injury of skin     Stage 3 that healed and has reoccurred   • Pulmonary nodule    • Sensory polyneuropathy    • Sleep apnea    • Spondylosis of lumbar region without myelopathy or radiculopathy 10/25/2019   • Thyroid nodule    • Tobacco abuse    • Tremor      Past Surgical History:   Procedure Laterality Date   • APPENDECTOMY     • CATARACT EXTRACTION      debra   • ESOPHAGOGASTRODUODENOSCOPY     • HAND SURGERY  04/30/2018   • LIVER SURGERY  03/09/2018   • ID REMV PILONIDAL LESION SIMPLE N/A 9/28/2018    Procedure: PILONIDAL CYSTECTOMY;  Surgeon: Jose Angel iBrd DO;  Location: [address] [place] [address];  Service: General   • THYROID SURGERY     • TONSILLECTOMY       Social History     Socioeconomic History   • Marital status:       Spouse name: None   • Number of children: None   • Years of education: None   • Highest education level: None   Occupational History   • Occupation:    Tobacco Use • Smoking status: Current Every Day Smoker     Packs/day: 1 00     Types: Cigarettes     Start date: 3/25/1961   • Smokeless tobacco: Never Used   • Tobacco comment: hx of nicotine abuse 2-3 packs   Vaping Use   • Vaping Use: Never used   Substance and Sexual Activity   • Alcohol use: Yes     Comment: 2-3 oz daily    • Drug use: No   • Sexual activity: Not Currently     Partners: Female   Other Topics Concern   • None   Social History Narrative    Hx of caffeine abuse    Hx of ETOH abuse     Social Determinants of Health     Financial Resource Strain: Not on file   Food Insecurity: Not on file   Transportation Needs: Not on file   Physical Activity: Not on file   Stress: Not on file   Social Connections: Not on file   Intimate Partner Violence: Not on file   Housing Stability: Not on file        Current Outpatient Medications:   •  albuterol (PROVENTIL HFA,VENTOLIN HFA) 90 mcg/act inhaler, INHALE 2 PUFFS BY MOUTH EVERY 6 HOURS AS NEEDED FOR WHEEZING, Disp: 18 g, Rfl: 2  •  budesonide-formoterol (Symbicort) 160-4 5 mcg/act inhaler, Inhale 2 puffs 2 (two) times a day Rinse mouth after use , Disp: 10 2 g, Rfl: 2  •  Cholecalciferol 1000 units CHEW, Chew 5,000 Units daily, Disp: , Rfl:   •  losartan (COZAAR) 50 mg tablet, Take 1 tablet (50 mg total) by mouth daily, Disp: 30 tablet, Rfl: 10  Family History   Problem Relation Age of Onset   • Diabetes Father    • Other Father         colon problems/colostomy   • Alcohol abuse Sister               Coordination of Care: Wound team aware of the treatment plan  Facility nurse will provide wound treatment and monitor the wound for any changes  Patient / Staff education : Patient / Staff was given education on sign of infection and pressure ulcer prevention  Patient/ Staff verbalized understanding     Follow up :  Next week    Voice-recognition software may have been used in the preparation of this document   Occasional wrong word or "sound-alike" substitutions may have occurred due to the inherent limitations of voice recognition software  Interpretation should be guided by context        Thom Archer

## 2022-11-15 ENCOUNTER — NURSING HOME VISIT (OUTPATIENT)
Dept: WOUND CARE | Facility: HOSPITAL | Age: 82
End: 2022-11-15

## 2022-11-15 DIAGNOSIS — T14.8XXA SURGICAL WOUND PRESENT: Primary | ICD-10-CM

## 2022-11-15 NOTE — ASSESSMENT & PLAN NOTE
History :  - LEFT FEMORAL ARTERY THROMBECTOMY, LEFT LOWER EXTREMITY THROMBOEMBOLECTOMY AND FASCIOTOMIES, RIGHT TO LEFT FEMORAL-FEMORAL BYPASS on 9/11/2022  - the wound on the left groin is covered with eschar, with no obvious sign of infection  Ordered skin prep for local wound care  - the wound on the left lower leg medial decrease in size, change treatment to collagen  - the wound on the left lower leg lateral - decrease in wound size    Change local wound care to collagen  - patient currently on protein supplement, HiCal and Prosource  - followup next week

## 2022-11-15 NOTE — PROGRESS NOTES
[address] [facility]       Patient ID: Roxy Olvera is a [de-identified] y o  male Date of Birth 1940     Location of Service: [facility]     Performed wound round with: Wound team     Chief Complaint :  Left and right groin, and left lower leg    Wound Instructions:  Wound:  Coccyx  Discontinue previous wound order  Cleanse the skin with soap and water, pat dry  Apply zinc oxide to wound bed  Frequency : twice a day and prn for soiling    Location : Left groin  Cleanse with normal saline solution  Apply skin prep  to wound bed and covered border gauze  Daily and p r n  for soiling    Location:  Left lower leg  Cleansed with normal saline solution  Apply collagen dressing ( xerofoam if not available) to wound bed and covered with ABD and wrap with Kerlix  3 times a week and p r n  for soiling    Offload all wounds  Turn and reposition frequently  Increase protein intake  Monitor for any sign of infection or worsening, inform PCP or patient's primary physician in your facility  Allergies  Pneumococcal vaccine      Assessment & Plan:  1  Surgical wound present  Assessment & Plan:  History :  - LEFT FEMORAL ARTERY THROMBECTOMY, LEFT LOWER EXTREMITY THROMBOEMBOLECTOMY AND FASCIOTOMIES, RIGHT TO LEFT FEMORAL-FEMORAL BYPASS on 9/11/2022  - the wound on the left groin is covered with eschar, with no obvious sign of infection  Ordered skin prep for local wound care  - the wound on the left lower leg medial decrease in size, change treatment to collagen  - the wound on the left lower leg lateral - decrease in wound size  Change local wound care to collagen  - patient currently on protein supplement, HiCal and Prosource  - followup next week             Subjective:   9/20/2022This is a [de-identified] y o , male referred to our service for wound/ skin alterations on coccyx and left lower leg  Patient have a complex medical history including but not limited to  Cancer [facility]), COPD (chronic obstructive pulmonary disease) ([facility] [address] ), Decubitus ulcer of sacral region, stage 3 ([facility] [address] ), Diverticulitis, ETOH abuse and Nicotine abuse   Patient was referred by Senior Care Team  Patient was seen in collaboration with the facility wound team      Wound History:   As per medical record review, patient had critical limb ischemia and had LEFT FEMORAL ARTERY THROMBECTOMY, LEFT LOWER EXTREMITY THROMBOEMBOLECTOMY AND FASCIOTOMIES, RIGHT TO LEFT FEMORAL-FEMORAL BYPASS on 9/11/2022  The wound on the coccyx is chronic  Received patient in bed, seems comfortable  Denies pain  Current treatment on wound is dry sterile dressing  Patient currently on isoloation for COVID     9/27/2022 Follow up for wound on the buttocks and left groin  Received patient, not in distress  Facility staff did not report any significant issues related to the wound  Denies pain, no fever  10/4/2022 Follow up for wound on the coccyx  Received patient, not in distress  Facility staff did not report any significant issues related to the wound  As per report, patient still on Bactrim for infection on the left groin  Patient will be seen by surgeon today at 7 pm     10/11/2022 Follow up for wound on the coccyx    Received patient, not in distress  Facility staff did not report any significant issues related to the wound  Denies pain  10/18/2022 Follow up for wound on the coccyx   Received patient, not in distress  Facility staff did not report any significant issues related to the wound  No new issues  10/25/2022 Follow up for wound on the groin and left lower leg   Received patient, not in distress  Facility staff did not report any significant issues related to the wound  As per report, patient was recently seen by surgeon and suture was removed  11/1/2022 Follow up for wound on the left groin, left lower leg   Received patient, not in distress  Facility staff did not report any significant issues related to the wound    Denies pain     11/8/2022 Follow up for wound on the left groin and left lower leg   Received patient, not in distress  Facility staff did not report any significant issues related to the wound  As per report, the collagen dressing still not available  Patient denies pain  11/15/2022 Follow up for wound on the left groin and left lower leg   Received patient, not in distress  Facility staff did not report any significant issues related to the wound  As per report, patient had cellulitis on the wound on the left groin and completed oral antibiotic  Denies pain  Denies fever  Review of Systems   Constitutional: Negative  HENT: Negative  Eyes: Negative  Respiratory: Negative  Cardiovascular: Negative  Gastrointestinal: Negative  Endocrine: Negative  Genitourinary: Negative  Musculoskeletal: Positive for gait problem  Skin: Positive for wound  See HPI   Hematological: Negative  Psychiatric/Behavioral: Negative  All other systems reviewed and are negative  Objective:    Physical Exam  Constitutional:       Appearance: Normal appearance  HENT:      Head: Normocephalic and atraumatic  Nose: Nose normal       Mouth/Throat:      Mouth: Mucous membranes are moist    Eyes:      Conjunctiva/sclera: Conjunctivae normal    Pulmonary:      Effort: Pulmonary effort is normal    Abdominal:      Tenderness: There is no abdominal tenderness  Genitourinary:     Comments: continent  Musculoskeletal:      Cervical back: Normal range of motion  Comments: LROM   Skin:     Coloration: Pallor: xh       Findings: Lesion present        Comments: Right groin - surgical incision healed  Left groin - wound size is 0 2 x 1 cm 100% eschar, no drainage, periwound is normal, no obvious sign of infection  Left lower leg medial - wound size is 7 x 1 x 0 1 cm  , 100% granulation, small amount of serous drainage, periwound is normal, no malodor, no obvious sign of infection    Left lower leg lateral - wound size is 7 0 x 1 3 x 0 1 , 50% 100% granulation, with small amount of serous drainage, periwound is normal no malodor, no obvious sign of infection   Neurological:      Mental Status: He is alert  Gait: Gait abnormal    Psychiatric:         Mood and Affect: Mood normal          Behavior: Behavior normal               Procedures           Patient's care was coordinated with nursing facility staff  Recent vitals, labs and updated medications were reviewed on EMR or chart system of facility  Past Medical, surgical, social, medication and allergy history and patient's previous records were reviewed and updated as appropriate: Most up-to date information is available in the facility EMR where the patient is currently admitted      Patient Active Problem List   Diagnosis   • Tremor, essential   • Other specified polyneuropathies   • Malignant neoplasm of prostate ([facility] [address] )   • Chronic pansinusitis   • Alcoholic cirrhosis of liver (HCC)   • COPD (chronic obstructive pulmonary disease) (HCC)   • Gout   • Hepatocellular carcinoma (HCC)   • Hypertension   • LIANA (obstructive sleep apnea)   • Pulmonary nodule   • Contracture of palmar fascia   • Constipation   • Diverticulitis   • Gastroesophageal reflux disease   • Impaired fasting glucose   • Lyme disease   • Tremor   • Tobacco dependence syndrome   • History of prostate cancer   • Thyroid nodule   • Synovial cyst of right popliteal space   • Hyperplasia of prostate   • Ventral hernia without obstruction or gangrene   • Diverticulosis   • Encounter for well adult exam with abnormal findings   • Leucopenia   • Spondylosis of lumbar region without myelopathy or radiculopathy   • Thrombocytopenia (HCC)   • Mild episode of recurrent major depressive disorder ([facility] [address] )   • Medicare annual wellness visit, subsequent   • Malignant neoplasm of upper lobe of right lung ([facility] [address] )   • Gross hematuria   • Non compliance w medication regimen   • Urothelial carcinoma of bladder (HCC)   • Severe protein-energy malnutrition ([facility] [address] )   • 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 [facility])   • Malignant neoplasm of lower lobe of right lung (HCC)   • Hematuria   • Alcohol dependence, daily use (HCC)   • Seborrheic dermatitis of scalp   • Tobacco abuse   • Fall   • Gait abnormality   • Hypothyroidism due to medication   • Closed fracture of second lumbar vertebra (HCC)   • Pressure injury of sacral region, stage 3 ([facility] [address] )   • Surgical wound present   • COVID     Past Medical History:   Diagnosis Date   • Anemia     dur to blood loss in the past   • BMI 25 0-25 9,adult 5/24/2019   • BPH (benign prostatic hyperplasia) 2002   • 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[place] [address] )    • Cancer (HCC)     hepatocellular, prostate   • Chronic bronchitis (HCC)    • Chronic bronchitis ([place] [address] ) 7/19/2016    Last Assessment & Plan:  He has symptoms of chronic bronchitis  He would of course benefit from smoking cessation  I have recommended a trial of Spiriva     • Colitis    • COPD (chronic obstructive pulmonary disease) (HCC)     chronic bronchitis   • Decubitus ulcer of sacral region, stage 3 ([facility] [address] ) 3/12/2018   • Diverticulitis    • Essential tremor    • ETOH abuse    • GERD (gastroesophageal reflux disease)    • Gout    • Hearing loss    • Hemorrhoids    • History of Lyme disease    • History of thrombocytopenia     chronic mild   • History of transfusion    • HTN (hypertension)    • Hydrocele    • Hyperlipidemia    • Hypertension    • Hyperthyroidism    • Irritable bowel syndrome    • Liver disease     cirrhosis   • Lyme disease    • Meningioma (HCC)    • Meningioma (HCC)     left frontal- followed by Neurosurgery   • Nicotine abuse     2-3 packs   • Obstructive sleep apnea     unable to tolerate CPAP   • Overweight with body mass index (BMI) of 25 to 25 9 in adult 11/3/2021   • Pressure injury of skin     Stage 3 that healed and has reoccurred   • Pulmonary nodule    • Sensory polyneuropathy    • Sleep apnea    • Spondylosis of lumbar region without myelopathy or radiculopathy 10/25/2019   • Thyroid nodule    • Tobacco abuse    • Tremor      Past Surgical History:   Procedure Laterality Date   • APPENDECTOMY     • CATARACT EXTRACTION      debra   • ESOPHAGOGASTRODUODENOSCOPY     • HAND SURGERY  04/30/2018   • LIVER SURGERY  03/09/2018   • ME REMV PILONIDAL LESION SIMPLE N/A 9/28/2018    Procedure: PILONIDAL CYSTECTOMY;  Surgeon: Elias Rock DO;  Location: [address] [place] [address];  Service: General   • THYROID SURGERY     • TONSILLECTOMY       Social History     Socioeconomic History   • Marital status:       Spouse name: None   • Number of children: None   • Years of education: None   • Highest education level: None   Occupational History   • Occupation:    Tobacco Use   • Smoking status: Current Every Day Smoker     Packs/day: 1 00     Types: Cigarettes     Start date: 3/25/1961   • Smokeless tobacco: Never Used   • Tobacco comment: hx of nicotine abuse 2-3 packs   Vaping Use   • Vaping Use: Never used   Substance and Sexual Activity   • Alcohol use: Yes     Comment: 2-3 oz daily    • Drug use: No   • Sexual activity: Not Currently     Partners: Female   Other Topics Concern   • None   Social History Narrative    Hx of caffeine abuse    Hx of ETOH abuse     Social Determinants of Health     Financial Resource Strain: Not on file   Food Insecurity: Not on file   Transportation Needs: Not on file   Physical Activity: Not on file   Stress: Not on file   Social Connections: Not on file   Intimate Partner Violence: Not on file   Housing Stability: Not on file        Current Outpatient Medications:   •  albuterol (PROVENTIL HFA,VENTOLIN HFA) 90 mcg/act inhaler, INHALE 2 PUFFS BY MOUTH EVERY 6 HOURS AS NEEDED FOR WHEEZING, Disp: 18 g, Rfl: 2  •  budesonide-formoterol (Symbicort) 160-4 5 mcg/act inhaler, Inhale 2 puffs 2 (two) times a day Rinse mouth after use , Disp: 10 2 g, Rfl: 2  •  Cholecalciferol 1000 units CHEW, Chew 5,000 Units daily, Disp: , Rfl:   •  losartan (COZAAR) 50 mg tablet, Take 1 tablet (50 mg total) by mouth daily, Disp: 30 tablet, Rfl: 10  Family History   Problem Relation Age of Onset   • Diabetes Father    • Other Father         colon problems/colostomy   • Alcohol abuse Sister               Coordination of Care: Wound team aware of the treatment plan  Facility nurse will provide wound treatment and monitor the wound for any changes  Patient / Staff education : Patient / Staff was given education on sign of infection and pressure ulcer prevention  Patient/ Staff verbalized understanding     Follow up :  Next week    Voice-recognition software may have been used in the preparation of this document  Occasional wrong word or "sound-alike" substitutions may have occurred due to the inherent limitations of voice recognition software  Interpretation should be guided by burke Galvez

## 2022-11-22 ENCOUNTER — NURSING HOME VISIT (OUTPATIENT)
Dept: WOUND CARE | Facility: HOSPITAL | Age: 82
End: 2022-11-22

## 2022-11-22 DIAGNOSIS — T14.8XXA SURGICAL WOUND PRESENT: Primary | ICD-10-CM

## 2022-11-22 NOTE — ASSESSMENT & PLAN NOTE
History :  - LEFT FEMORAL ARTERY THROMBECTOMY, LEFT LOWER EXTREMITY THROMBOEMBOLECTOMY AND FASCIOTOMIES, RIGHT TO LEFT FEMORAL-FEMORAL BYPASS on 9/11/2022  - the wound on the left groin  -healed  - the wound on the left lower leg medial decrease in size, change treatment to collagen  - the wound on the left lower leg lateral - decrease in wound size    Change local wound care to collagen  - selective debridement performed  - patient currently on protein supplement, HiCal and Prosource  - followup : two weeks

## 2022-12-06 ENCOUNTER — NURSING HOME VISIT (OUTPATIENT)
Dept: WOUND CARE | Facility: HOSPITAL | Age: 82
End: 2022-12-06

## 2022-12-06 DIAGNOSIS — T14.8XXA SURGICAL WOUND PRESENT: Primary | ICD-10-CM

## 2022-12-06 NOTE — ASSESSMENT & PLAN NOTE
History :  - LEFT FEMORAL ARTERY THROMBECTOMY, LEFT LOWER EXTREMITY THROMBOEMBOLECTOMY AND FASCIOTOMIES, RIGHT TO LEFT FEMORAL-FEMORAL BYPASS on 9/11/2022  - the wound on the left groin  -healed  - the wound on the left lower leg medial -improved wound bed, continue collagen  - the wound on the left lower leg lateral - decrease in wound size  Continue collagen    - patient currently on protein supplement, HiCal and Prosource  - followup : Next week

## 2022-12-06 NOTE — PROGRESS NOTES
[address] [facility]NAG[facility]   [facility]       Patient ID: Harman Grissom is a [de-identified] y o  male Date of Birth 1940     [facility] of [facility]: [facility]    Performed wound round with: Wound team     Chief Complaint :  Left and right groin, and left lower leg    Wound Instructions:  Wound:  Coccyx  Discontinue previous wound order  Cleanse the skin with soap and water, pat dry  Apply zinc oxide to wound bed  Frequency : twice a day and prn for soiling    Location : Left groin  D/c wound order    Location:  Left lower leg  Cleansed with normal saline solution  Apply collagen dressing ( xerofoam if not available) to wound bed and covered with ABD and wrap with Kerlix  3 times a week and p r n  for soiling    Offload all wounds  Turn and reposition frequently  Increase protein intake  Monitor for any sign of infection or worsening, inform PCP or patient's primary physician in your facility  Allergies  Pneumococcal vaccine      Assessment & Plan:  1  Surgical wound present  Assessment & Plan:  History :  - LEFT FEMORAL ARTERY THROMBECTOMY, LEFT LOWER EXTREMITY THROMBOEMBOLECTOMY AND FASCIOTOMIES, RIGHT TO LEFT FEMORAL-FEMORAL BYPASS on 9/11/2022  - the wound on the left groin  -healed  - the wound on the left lower leg medial -improved wound bed, continue collagen  - the wound on the left lower leg lateral - decrease in wound size  Continue collagen  - patient currently on protein supplement, HiCal and Prosource  - followup : Next week             Subjective:   9/20/2022This is a [de-identified] y o , male referred to our service for wound/ skin alterations on coccyx and left lower leg  Patient have a complex medical history including but not limited to  Cancer [facility]), COPD (chronic obstructive pulmonary disease) ([facility] [address] ), Decubitus ulcer of sacral region, stage 3 ([facility] [address] ), Diverticulitis, ETOH abuse and Nicotine abuse    Patient was referred by Senior Care Team  Patient was seen in collaboration with the facility wound team      Wound History:   As per medical record review, patient had critical limb ischemia and had LEFT FEMORAL ARTERY THROMBECTOMY, LEFT LOWER EXTREMITY THROMBOEMBOLECTOMY AND FASCIOTOMIES, RIGHT TO LEFT FEMORAL-FEMORAL BYPASS on 9/11/2022  The wound on the coccyx is chronic  Received patient in bed, seems comfortable  Denies pain  Current treatment on wound is dry sterile dressing  Patient currently on isoloation for COVID     9/27/2022 Follow up for wound on the buttocks and left groin  Received patient, not in distress  Facility staff did not report any significant issues related to the wound  Denies pain, no fever  10/4/2022 Follow up for wound on the coccyx  Received patient, not in distress  Facility staff did not report any significant issues related to the wound  As per report, patient still on Bactrim for infection on the left groin  Patient will be seen by surgeon today at 7 pm     10/11/2022 Follow up for wound on the coccyx    Received patient, not in distress  Facility staff did not report any significant issues related to the wound  Denies pain  10/18/2022 Follow up for wound on the coccyx   Received patient, not in distress  Facility staff did not report any significant issues related to the wound  No new issues  10/25/2022 Follow up for wound on the groin and left lower leg   Received patient, not in distress  Facility staff did not report any significant issues related to the wound  As per report, patient was recently seen by surgeon and suture was removed  11/1/2022 Follow up for wound on the left groin, left lower leg   Received patient, not in distress  Facility staff did not report any significant issues related to the wound  Denies pain  11/8/2022 Follow up for wound on the left groin and left lower leg   Received patient, not in distress  Facility staff did not report any significant issues related to the wound    As per report, the collagen dressing still not available  Patient denies pain  11/15/2022 Follow up for wound on the left groin and left lower leg   Received patient, not in distress  Facility staff did not report any significant issues related to the wound  As per report, patient had cellulitis on the wound on the left groin and completed oral antibiotic  Denies pain  Denies fever  11/22/2022 Follow up for wound on the left groin and left lower leg   Received patient, not in distress  Facility staff did not report any significant issues related to the wound  12/6/2022 Follow up for wound on the left lower leg   Received patient, not in distress  Facility staff did not report any significant issues related to the wound  Denies pain  Review of Systems   Constitutional: Negative  HENT: Negative  Eyes: Negative  Respiratory: Negative  Cardiovascular: Negative  Gastrointestinal: Negative  Endocrine: Negative  Genitourinary: Negative  Musculoskeletal: Positive for gait problem  Skin: Positive for wound  See HPI   Hematological: Negative  Psychiatric/Behavioral: Negative  All other systems reviewed and are negative  Objective:    Physical Exam  Constitutional:       Appearance: Normal appearance  HENT:      Head: Normocephalic and atraumatic  Nose: Nose normal       Mouth/Throat:      Mouth: Mucous membranes are moist    Eyes:      Conjunctiva/sclera: Conjunctivae normal    Pulmonary:      Effort: Pulmonary effort is normal    Abdominal:      Tenderness: There is no abdominal tenderness  Genitourinary:     Comments: continent  Musculoskeletal:      Cervical back: Normal range of motion  Comments: LROM   Skin:     Coloration: Pallor: xh       Findings: Lesion present        Comments: Right groin - surgical incision healed  Left groin - wound - healed  Left lower leg medial - wound size is 11x  5 x 0 1 cm   cm  , 100% epithelial, small amount of serous drainage, periwound is normal, no malodor, no obvious sign of infection    Left lower leg lateral - wound size is 6 5 x 0 7 x 0 1 cm ,  100% granulation, with small amount of serous drainage, periwound is normal no malodor, no obvious sign of infection   Neurological:      Mental Status: He is alert  Gait: Gait abnormal    Psychiatric:         Mood and Affect: Mood normal          Behavior: Behavior normal               Procedures           Patient's care was coordinated with nursing facility staff  Recent vitals, labs and updated medications were reviewed on EMR or chart system of facility  Past Medical, surgical, social, medication and allergy history and patient's previous records were reviewed and updated as appropriate: Most up-to date information is available in the facility EMR where the patient is currently admitted      Patient Active Problem List   Diagnosis   • Tremor, essential   • Other specified polyneuropathies   • Malignant neoplasm of prostate ([facility] [place] [address] )   • Chronic pansinusitis   • Alcoholic cirrhosis of liver (HCC)   • COPD (chronic obstructive pulmonary disease) (HCC)   • Gout   • Hepatocellular carcinoma (HCC)   • Hypertension   • LIANA (obstructive sleep apnea)   • Pulmonary nodule   • Contracture of palmar fascia   • Constipation   • Diverticulitis   • Gastroesophageal reflux disease   • Impaired fasting glucose   • Lyme disease   • Tremor   • Tobacco dependence syndrome   • History of prostate cancer   • Thyroid nodule   • Synovial cyst of right popliteal space   • Hyperplasia of prostate   • Ventral hernia without obstruction or gangrene   • Diverticulosis   • Encounter for well adult exam with abnormal findings   • Leucopenia   • Spondylosis of lumbar region without myelopathy or radiculopathy   • Thrombocytopenia (HCC)   • Mild episode of recurrent major depressive disorder ([facility] [place] [address] )   • Medicare annual wellness visit, subsequent   • Malignant neoplasm of upper lobe of right lung (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 [facility])   • Malignant neoplasm of lower lobe of right lung (HCC)   • Hematuria   • Alcohol dependence, daily use (HCC)   • Seborrheic dermatitis of scalp   • Tobacco abuse   • Fall   • Gait abnormality   • Hypothyroidism due to medication   • Closed fracture of second lumbar vertebra (HCC)   • Pressure injury of sacral region, stage 3 ([facility] [address] )   • Surgical wound present   • COVID     Past Medical History:   Diagnosis Date   • Anemia     dur to blood loss in the past   • BMI 25 0-25 9,adult 5/24/2019   • BPH (benign prostatic hyperplasia) 2002   • 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[facility] )    • Cancer (HCC)     hepatocellular, prostate   • Chronic bronchitis (HCC)    • Chronic bronchitis ([facility] ) 7/19/2016    Last Assessment & Plan:  He has symptoms of chronic bronchitis  He would of course benefit from smoking cessation  I have recommended a trial of Spiriva     • Colitis    • COPD (chronic obstructive pulmonary disease) (HCC)     chronic bronchitis   • Decubitus ulcer of sacral region, stage 3 ([facility] [address] ) 3/12/2018   • Diverticulitis    • Essential tremor    • ETOH abuse    • GERD (gastroesophageal reflux disease)    • Gout    • Hearing loss    • Hemorrhoids    • History of Lyme disease    • History of thrombocytopenia     chronic mild   • History of transfusion    • HTN (hypertension)    • Hydrocele    • Hyperlipidemia    • Hypertension    • Hyperthyroidism    • Irritable bowel syndrome    • Liver disease     cirrhosis   • Lyme disease    • Meningioma (HCC)    • Meningioma (HCC)     left frontal- followed by Neurosurgery   • Nicotine abuse     2-3 packs   • Obstructive sleep apnea     unable to tolerate CPAP   • Overweight with body mass index (BMI) of 25 to 25 9 in adult 11/3/2021   • Pressure injury of skin     Stage 3 that healed and has reoccurred   • Pulmonary nodule    • Sensory polyneuropathy    • Sleep apnea    • Spondylosis of lumbar region without myelopathy or radiculopathy 10/25/2019   • Thyroid nodule    • Tobacco abuse    • Tremor      Past Surgical History:   Procedure Laterality Date   • APPENDECTOMY     • CATARACT EXTRACTION      debra   • ESOPHAGOGASTRODUODENOSCOPY     • HAND SURGERY  04/30/2018   • LIVER SURGERY  03/09/2018   • VT REMV PILONIDAL LESION SIMPLE N/A 9/28/2018    Procedure: PILONIDAL CYSTECTOMY;  Surgeon: Tommie Holstein, DO;  Location: [facility] MAIN OR;  Service: General   • THYROID SURGERY     • TONSILLECTOMY       Social History     Socioeconomic History   • Marital status:       Spouse name: None   • Number of children: None   • Years of education: None   • Highest education level: None   Occupational History   • Occupation:    Tobacco Use   • Smoking status: Every Day     Packs/day: 1 00     Types: Cigarettes     Start date: 3/25/1961   • Smokeless tobacco: Never   • Tobacco comments:     hx of nicotine abuse 2-3 packs   Vaping Use   • Vaping Use: Never used   Substance and Sexual Activity   • Alcohol use: Yes     Comment: 2-3 oz daily    • Drug use: No   • Sexual activity: Not Currently     Partners: Female   Other Topics Concern   • None   Social History Narrative    Hx of caffeine abuse    Hx of ETOH abuse     Social Determinants of Health     Financial Resource Strain: Not on file   Food Insecurity: Not on file   Transportation Needs: Not on file   Physical Activity: Not on file   Stress: Not on file   Social Connections: Not on file   Intimate Partner Violence: Not on file   Housing Stability: Not on file        Current Outpatient Medications:   •  albuterol (PROVENTIL HFA,VENTOLIN HFA) 90 mcg/act inhaler, INHALE 2 PUFFS BY MOUTH EVERY 6 HOURS AS NEEDED FOR WHEEZING, Disp: 18 g, Rfl: 2  •  budesonide-formoterol (Symbicort) 160-4 5 mcg/act inhaler, Inhale 2 puffs 2 (two) times a day Rinse mouth after use , Disp: 10 2 g, Rfl: 2  •  Cholecalciferol 1000 units CHEW, Chew 5,000 Units daily, Disp: , Rfl:   •  losartan (COZAAR) 50 mg tablet, Take 1 tablet (50 mg total) by mouth daily, Disp: 30 tablet, Rfl: 10  Family History   Problem Relation Age of Onset   • Diabetes Father    • Other Father         colon problems/colostomy   • Alcohol abuse Sister               Coordination of Care: Wound team aware of the treatment plan  Facility nurse will provide wound treatment and monitor the wound for any changes  Patient / Staff education : Patient / Staff was given education on sign of infection and pressure ulcer prevention  Patient/ Staff verbalized understanding     Follow up :  Next week    Voice-recognition software may have been used in the preparation of this document  Occasional wrong word or "sound-alike" substitutions may have occurred due to the inherent limitations of voice recognition software  Interpretation should be guided by burke Galvez

## 2022-12-13 ENCOUNTER — NURSING HOME VISIT (OUTPATIENT)
Dept: WOUND CARE | Facility: HOSPITAL | Age: 82
End: 2022-12-13

## 2022-12-13 DIAGNOSIS — R32 DERMATITIS ASSOCIATED WITH INCONTINENCE: Primary | ICD-10-CM

## 2022-12-13 DIAGNOSIS — L25.8 DERMATITIS ASSOCIATED WITH INCONTINENCE: Primary | ICD-10-CM

## 2022-12-13 DIAGNOSIS — T14.8XXA SURGICAL WOUND PRESENT: ICD-10-CM

## 2022-12-13 NOTE — ASSESSMENT & PLAN NOTE
Buttocks  Patient is incontinent of bowel and bladder this past week  Use of diaper is not a contributing factor   -Red, blanchable, excoriated  -Local wound care    [facility]  -Continue to offload  -Follow-up next week

## 2022-12-13 NOTE — ASSESSMENT & PLAN NOTE
History :  - LEFT FEMORAL ARTERY THROMBECTOMY, LEFT LOWER EXTREMITY THROMBOEMBOLECTOMY AND FASCIOTOMIES, RIGHT TO LEFT FEMORAL-FEMORAL BYPASS on 9/11/2022    -Wound is improving  The wound on the medial is 100% eschar  The wound on the lateral is hypergranulation, cauterized performed   -Change treatment to wound lateral wound to calcium alginate    - patient currently on protein supplement, HiCal and Prosource  - followup : Next week

## 2022-12-13 NOTE — PROGRESS NOTES
[address] [facility]NAG[facility]   [facility]       Patient ID: Clem Zuleta is a 81 y o  male Date of Birth 1940     Location of Service: [place] [address]    Performed wound round with: Wound team     Chief Complaint : Buttocks and left lower leg    Wound Instructions:  Wound: Buttocks  Discontinue previous wound order  Cleanse the skin with soap and water, pat dry  Apply Triad paste to wound bed  Frequency : twice a day and prn for soiling    Location:  Left lower leg  Cleansed with normal saline solution  Apply Skin-Prep to periwound area and left lower leg medial  Apply silver alginate to wound bed on left lower leg lateral and cover with ABD and kerlix  3 times a week and as needed for soiling    Offload all wounds  Turn and reposition frequently  Increase protein intake  Monitor for any sign of infection or worsening, inform PCP or patient's primary physician in your facility  Allergies  Pneumococcal vaccine      Assessment & Plan:  1  Dermatitis associated with incontinence  Assessment & Plan:  Buttocks  Patient is incontinent of bowel and bladder this past week  Use of diaper is not a contributing factor   -Red, blanchable, excoriated  -Local wound care  Triad  -Continue to offload  -Follow-up next week      2  Surgical wound present  Assessment & Plan:  History :  - LEFT FEMORAL ARTERY THROMBECTOMY, LEFT LOWER EXTREMITY THROMBOEMBOLECTOMY AND FASCIOTOMIES, RIGHT TO LEFT FEMORAL-FEMORAL BYPASS on 9/11/2022    -Wound is improving  The wound on the medial is 100% eschar  The wound on the lateral is hypergranulation, cauterized performed   -Change treatment to wound lateral wound to calcium alginate  - patient currently on protein supplement, HiCal and Prosource  - followup : Next week             Subjective:   9/20/2022This is a [de-identified] y o , male referred to our service for wound/ skin alterations on coccyx and left lower leg  Patient have a complex medical history including but not limited to  Cancer [facility]), COPD (chronic obstructive pulmonary disease) ([facility] [address] ), Decubitus ulcer of sacral region, stage 3 ([facility] [address] ), Diverticulitis, ETOH abuse and Nicotine abuse   Patient was referred by Senior Care Team  Patient was seen in collaboration with the facility wound team      Wound History:   As per medical record review, patient had critical limb ischemia and had LEFT FEMORAL ARTERY THROMBECTOMY, LEFT LOWER EXTREMITY THROMBOEMBOLECTOMY AND FASCIOTOMIES, RIGHT TO LEFT FEMORAL-FEMORAL BYPASS on 9/11/2022  The wound on the coccyx is chronic  Received patient in bed, seems comfortable  Denies pain  Current treatment on wound is dry sterile dressing  Patient currently on isoloation for COVID     9/27/2022 Follow up for wound on the buttocks and left groin  Received patient, not in distress  Facility staff did not report any significant issues related to the wound  Denies pain, no fever  10/4/2022 Follow up for wound on the coccyx  Received patient, not in distress  Facility staff did not report any significant issues related to the wound  As per report, patient still on Bactrim for infection on the left groin  Patient will be seen by surgeon today at 7 pm     10/11/2022 Follow up for wound on the coccyx    Received patient, not in distress  Facility staff did not report any significant issues related to the wound  Denies pain  10/18/2022 Follow up for wound on the coccyx   Received patient, not in distress  Facility staff did not report any significant issues related to the wound  No new issues  10/25/2022 Follow up for wound on the groin and left lower leg   Received patient, not in distress  Facility staff did not report any significant issues related to the wound  As per report, patient was recently seen by surgeon and suture was removed  11/1/2022 Follow up for wound on the left groin, left lower leg   Received patient, not in distress   Facility staff did not report any significant issues related to the wound  Denies pain  11/8/2022 Follow up for wound on the left groin and left lower leg   Received patient, not in distress  Facility staff did not report any significant issues related to the wound  As per report, the collagen dressing still not available  Patient denies pain  11/15/2022 Follow up for wound on the left groin and left lower leg   Received patient, not in distress  Facility staff did not report any significant issues related to the wound  As per report, patient had cellulitis on the wound on the left groin and completed oral antibiotic  Denies pain  Denies fever  11/22/2022 Follow up for wound on the left groin and left lower leg   Received patient, not in distress  Facility staff did not report any significant issues related to the wound  12/6/2022 Follow up for wound on the left lower leg   Received patient, not in distress  Facility staff did not report any significant issues related to the wound  Denies pain  12/13/2022 Follow up for wound on the left lower leg and new consult for wound on the buttocks  Received patient, not in distress  As per report, patient is incontinent of bowel and bladder this past week  Review of Systems   Constitutional: Negative  HENT: Negative  Eyes: Negative  Respiratory: Negative  Cardiovascular: Negative  Gastrointestinal: Negative  Endocrine: Negative  Genitourinary: Negative  Musculoskeletal: Positive for gait problem  Skin: Positive for wound  See HPI   Hematological: Negative  Psychiatric/Behavioral: Negative  All other systems reviewed and are negative  Objective:    Physical Exam  Constitutional:       Appearance: Normal appearance  HENT:      Head: Normocephalic and atraumatic        Nose: Nose normal       Mouth/Throat:      Mouth: Mucous membranes are moist    Eyes:      Conjunctiva/sclera: Conjunctivae normal    Pulmonary: Effort: Pulmonary effort is normal    Abdominal:      Tenderness: There is no abdominal tenderness  Genitourinary:     Comments: incontinent  Musculoskeletal:      Cervical back: Normal range of motion  Comments: LROM   Skin:     Coloration: Pallor: xh       Findings: Lesion present  Comments: Right groin - surgical incision healed  Left groin - wound - healed  Left lower leg medial -wound size is 11 x 0 5 cm, 100% eschar, no drainage, no obvious signs of infection  Left lower leg lateral - wound size is 6 0 x 0 7 x 0 1 cm ,  100% hypergranulation, with small amount of serous drainage, periwound is normal no malodor, no obvious sign of infection    Buttocks-excoriated, 3 x 3 x 0 1 cm, red, with no obvious signs of infection, blanchable   Neurological:      Mental Status: He is alert  Gait: Gait abnormal    Psychiatric:         Mood and Affect: Mood normal          Behavior: Behavior normal               Procedures           Patient's care was coordinated with nursing facility staff  Recent vitals, labs and updated medications were reviewed on EMR or chart system of facility  Past Medical, surgical, social, medication and allergy history and patient's previous records were reviewed and updated as appropriate: Most up-to date information is available in the facility EMR where the patient is currently admitted      Patient Active Problem List   Diagnosis   • Tremor, essential   • Other specified polyneuropathies   • Malignant neoplasm of prostate ([facility] [address] )   • Chronic pansinusitis   • Alcoholic cirrhosis of liver (HCC)   • COPD (chronic obstructive pulmonary disease) (HCC)   • Gout   • Hepatocellular carcinoma (HCC)   • Hypertension   • LIANA (obstructive sleep apnea)   • Pulmonary nodule   • Contracture of palmar fascia   • Constipation   • Diverticulitis   • Gastroesophageal reflux disease   • Impaired fasting glucose   • Lyme disease   • Tremor   • Tobacco dependence syndrome   • History of prostate cancer   • Thyroid nodule   • Synovial cyst of right popliteal space   • Hyperplasia of prostate   • Ventral hernia without obstruction or gangrene   • Diverticulosis   • Encounter for well adult exam with abnormal findings   • Leucopenia   • Spondylosis of lumbar region without myelopathy or radiculopathy   • Thrombocytopenia (HCC)   • Mild episode of recurrent major depressive disorder ([facility] )   • Medicare annual wellness visit, subsequent   • Malignant neoplasm of upper lobe of right lung ([place] [address] )   • Gross hematuria   • Non compliance w medication regimen   • Urothelial carcinoma of bladder ([place] [address] )   • Severe protein-energy malnutrition ([place] [address] )   • 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)   • Malignant neoplasm of lower lobe of right lung (HCC)   • Hematuria   • Alcohol dependence, daily use (HCC)   • Seborrheic dermatitis of scalp   • Tobacco abuse   • Fall   • Gait abnormality   • Hypothyroidism due to medication   • Closed fracture of second lumbar vertebra (HCC)   • Pressure injury of sacral region, stage 3 ([place] [address] )   • Surgical wound present   • COVID   • Dermatitis associated with incontinence     Past Medical History:   Diagnosis Date   • Anemia     dur to blood loss in the past   • BMI 25 0-25 9,adult 5/24/2019   • BPH (benign prostatic hyperplasia) 2002   • 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[place] [address] )    • Cancer ([place] [address] )     hepatocellular, prostate   • Chronic bronchitis (HCC)    • Chronic bronchitis ([place] [address] ) 7/19/2016    Last Assessment & Plan:  He has symptoms of chronic bronchitis  He would of course benefit from smoking cessation  I have recommended a trial of Spiriva     • Colitis    • COPD (chronic obstructive pulmonary disease) (HCC)     chronic bronchitis   • Decubitus ulcer of sacral region, stage 3 ([place] [address] ) 3/12/2018   • Diverticulitis    • Essential tremor    • ETOH abuse    • GERD (gastroesophageal reflux disease)    • Gout    • Hearing loss    • Hemorrhoids    • History of Lyme disease    • History of thrombocytopenia     chronic mild   • History of transfusion    • HTN (hypertension)    • Hydrocele    • Hyperlipidemia    • Hypertension    • Hyperthyroidism    • Irritable bowel syndrome    • Liver disease     cirrhosis   • Lyme disease    • Meningioma ([address] )    • Meningioma (HCC)     left frontal- followed by Neurosurgery   • Nicotine abuse     2-3 packs   • Obstructive sleep apnea     unable to tolerate CPAP   • Overweight with body mass index (BMI) of 25 to 25 9 in adult 11/3/2021   • Pressure injury of skin     Stage 3 that healed and has reoccurred   • Pulmonary nodule    • Sensory polyneuropathy    • Sleep apnea    • Spondylosis of lumbar region without myelopathy or radiculopathy 10/25/2019   • Thyroid nodule    • Tobacco abuse    • Tremor      Past Surgical History:   Procedure Laterality Date   • APPENDECTOMY     • CATARACT EXTRACTION      debra   • ESOPHAGOGASTRODUODENOSCOPY     • HAND SURGERY  04/30/2018   • LIVER SURGERY  03/09/2018   • AZ REMV PILONIDAL LESION SIMPLE N/A 9/28/2018    Procedure: PILONIDAL CYSTECTOMY;  Surgeon: Skylar Greco DO;  Location: [facility] MAIN OR;  Service: General   • THYROID SURGERY     • TONSILLECTOMY       Social History     Socioeconomic History   • Marital status:       Spouse name: None   • Number of children: None   • Years of education: None   • Highest education level: None   Occupational History   • Occupation:    Tobacco Use   • Smoking status: Every Day     Packs/day: 1 00     Types: Cigarettes     Start date: 3/25/1961   • Smokeless tobacco: Never   • Tobacco comments:     hx of nicotine abuse 2-3 packs   Vaping Use   • Vaping Use: Never used   Substance and Sexual Activity   • Alcohol use: Yes     Comment: 2-3 oz daily    • Drug use: No   • Sexual activity: Not Currently     Partners: Female   Other Topics Concern   • None   Social History Narrative    Hx of caffeine abuse    Hx of ETOH abuse     Social Determinants of Health     Financial Resource Strain: Not on file   Food Insecurity: Not on file Transportation Needs: Not on file   Physical Activity: Not on file   Stress: Not on file   Social Connections: Not on file   Intimate Partner Violence: Not on file   Housing Stability: Not on file        Current Outpatient Medications:   •  albuterol (PROVENTIL HFA,VENTOLIN HFA) 90 mcg/act inhaler, INHALE 2 PUFFS BY MOUTH EVERY 6 HOURS AS NEEDED FOR WHEEZING, Disp: 18 g, Rfl: 2  •  budesonide-formoterol (Symbicort) 160-4 5 mcg/act inhaler, Inhale 2 puffs 2 (two) times a day Rinse mouth after use , Disp: 10 2 g, Rfl: 2  •  Cholecalciferol 1000 units CHEW, Chew 5,000 Units daily, Disp: , Rfl:   •  losartan (COZAAR) 50 mg tablet, Take 1 tablet (50 mg total) by mouth daily, Disp: 30 tablet, Rfl: 10  Family History   Problem Relation Age of Onset   • Diabetes Father    • Other Father         colon problems/colostomy   • Alcohol abuse Sister               Coordination of Care: Wound team aware of the treatment plan  Facility nurse will provide wound treatment and monitor the wound for any changes  Patient / Staff education : Patient / Staff was given education on sign of infection and pressure ulcer prevention  Patient/ Staff verbalized understanding     Follow up :  Next week    Voice-recognition software may have been used in the preparation of this document  Occasional wrong word or "sound-alike" substitutions may have occurred due to the inherent limitations of voice recognition software  Interpretation should be guided by context        Maye Patel

## 2022-12-20 ENCOUNTER — NURSING HOME VISIT (OUTPATIENT)
Dept: WOUND CARE | Facility: HOSPITAL | Age: 82
End: 2022-12-20

## 2022-12-20 DIAGNOSIS — L25.8 DERMATITIS ASSOCIATED WITH INCONTINENCE: Primary | ICD-10-CM

## 2022-12-20 DIAGNOSIS — T14.8XXA SURGICAL WOUND PRESENT: ICD-10-CM

## 2022-12-20 DIAGNOSIS — R32 DERMATITIS ASSOCIATED WITH INCONTINENCE: Primary | ICD-10-CM

## 2022-12-20 NOTE — PROGRESS NOTES
[address] [facility]NAGEMENT   AND [facility]       Patient ID: Eufemia Avalos is a 81 y o  male Date of Birth 1940     Location of Service: [facility]     Performed wound round with: Wound team     Chief Complaint : Buttocks and left lower leg    Wound Instructions:  Wound: Buttocks  Discontinue previous wound order  Cleanse the skin with soap and water, pat dry  Apply Z guard to wound bed  Frequency : twice a day and prn for soiling    Location:  Left lower leg  Cleansed with normal saline solution  Apply Skin-Prep to periwound area and wound bed  Daily and as needed for soiling    Offload all wounds  Turn and reposition frequently  Increase protein intake  Monitor for any sign of infection or worsening, inform PCP or patient's primary physician in your facility  Allergies  Pneumococcal vaccine      Assessment & Plan:  1  Dermatitis associated with incontinence  Assessment & Plan:  Buttocks  Patient is incontinent of bowel and bladder this past week  Use of diaper is not a contributing factor   -Red, blanchable, excoriated, improved compared to last week  -Local wound care -changed to Z guard  -Continue to offload  -Sign off  Facility staff will continue to provide treatment and monitor the wound  Can reconsult wound nurse practitioner if wound failed to heal or worsened  2  Surgical wound present  Assessment & Plan:  History :  - LEFT FEMORAL ARTERY THROMBECTOMY, LEFT LOWER EXTREMITY THROMBOEMBOLECTOMY AND FASCIOTOMIES, RIGHT TO LEFT FEMORAL-FEMORAL BYPASS on 9/11/2022    -Wound covered with scab, superficial  -Skin-Prep until scab completely fell off  -Sign off  Facility staff will continue to provide treatment and monitor the wound  Can reconsult wound nurse practitioner if wound failed to heal or worsened  Subjective:   9/20/2022This is a [de-identified] y o , male referred to our service for wound/ skin alterations on coccyx and left lower leg  Patient have a complex medical history including but not limited to  Cancer [facility]), COPD (chronic obstructive pulmonary disease) ([facility] [address] ), Decubitus ulcer of sacral region, stage 3 ([facility] [address] ), Diverticulitis, ETOH abuse and Nicotine abuse   Patient was referred by Senior Care Team  Patient was seen in collaboration with the facility wound team      Wound History:   As per medical record review, patient had critical limb ischemia and had LEFT FEMORAL ARTERY THROMBECTOMY, LEFT LOWER EXTREMITY THROMBOEMBOLECTOMY AND FASCIOTOMIES, RIGHT TO LEFT FEMORAL-FEMORAL BYPASS on 9/11/2022  The wound on the coccyx is chronic  Received patient in bed, seems comfortable  Denies pain  Current treatment on wound is dry sterile dressing  Patient currently on isoloation for COVID     9/27/2022 Follow up for wound on the buttocks and left groin  Received patient, not in distress  Facility staff did not report any significant issues related to the wound  Denies pain, no fever  10/4/2022 Follow up for wound on the coccyx  Received patient, not in distress  Facility staff did not report any significant issues related to the wound  As per report, patient still on Bactrim for infection on the left groin  Patient will be seen by surgeon today at 7 pm     10/11/2022 Follow up for wound on the coccyx    Received patient, not in distress  Facility staff did not report any significant issues related to the wound  Denies pain  10/18/2022 Follow up for wound on the coccyx   Received patient, not in distress  Facility staff did not report any significant issues related to the wound  No new issues  10/25/2022 Follow up for wound on the groin and left lower leg   Received patient, not in distress  Facility staff did not report any significant issues related to the wound  As per report, patient was recently seen by surgeon and suture was removed  11/1/2022 Follow up for wound on the left groin, left lower leg   Received patient, not in distress   Facility staff did not report any significant issues related to the wound  Denies pain  11/8/2022 Follow up for wound on the left groin and left lower leg   Received patient, not in distress  Facility staff did not report any significant issues related to the wound  As per report, the collagen dressing still not available  Patient denies pain  11/15/2022 Follow up for wound on the left groin and left lower leg   Received patient, not in distress  Facility staff did not report any significant issues related to the wound  As per report, patient had cellulitis on the wound on the left groin and completed oral antibiotic  Denies pain  Denies fever  11/22/2022 Follow up for wound on the left groin and left lower leg   Received patient, not in distress  Facility staff did not report any significant issues related to the wound  12/6/2022 Follow up for wound on the left lower leg   Received patient, not in distress  Facility staff did not report any significant issues related to the wound  Denies pain  12/13/2022 Follow up for wound on the left lower leg and new consult for wound on the buttocks  Received patient, not in distress  As per report, patient is incontinent of bowel and bladder this past week  12/20/2022 Follow up for wound on the left lower leg and buttocks  Received patient, not in distress  As per report, patient had been incontinent in his briefs and had not been going to the bathroom  Review of Systems   Constitutional: Negative  HENT: Negative  Eyes: Negative  Respiratory: Negative  Cardiovascular: Negative  Gastrointestinal: Negative  Endocrine: Negative  Genitourinary: Negative  Musculoskeletal: Positive for gait problem  Skin: Positive for wound  See HPI   Hematological: Negative  Psychiatric/Behavioral: Negative  All other systems reviewed and are negative        Objective:    Physical Exam  Constitutional: Appearance: Normal appearance  HENT:      Head: Normocephalic and atraumatic  Nose: Nose normal       Mouth/Throat:      Mouth: Mucous membranes are moist    Eyes:      Conjunctiva/sclera: Conjunctivae normal    Pulmonary:      Effort: Pulmonary effort is normal    Abdominal:      Tenderness: There is no abdominal tenderness  Genitourinary:     Comments: incontinent  Musculoskeletal:      Cervical back: Normal range of motion  Comments: LROM   Skin:     Coloration: Pallor: xh       Findings: Lesion present  Comments: Right groin - surgical incision healed  Left groin - wound - healed  Left lower leg medial -scab, superficial   left lower leg lateral -wound size 1 x 0 5 cm, scab  Buttocks-excoriated, red, with no obvious signs of infection   Neurological:      Mental Status: He is alert  Gait: Gait abnormal    Psychiatric:         Mood and Affect: Mood normal          Behavior: Behavior normal               Procedures           Patient's care was coordinated with nursing facility staff  Recent vitals, labs and updated medications were reviewed on EMR or chart system of facility  Past Medical, surgical, social, medication and allergy history and patient's previous records were reviewed and updated as appropriate: Most up-to date information is available in the facility EMR where the patient is currently admitted      Patient Active Problem List   Diagnosis   • Tremor, essential   • Other specified polyneuropathies   • Malignant neoplasm of prostate ([facility] [address] )   • Chronic pansinusitis   • Alcoholic cirrhosis of liver (HCC)   • COPD (chronic obstructive pulmonary disease) (HCC)   • Gout   • Hepatocellular carcinoma (HCC)   • Hypertension   • LIANA (obstructive sleep apnea)   • Pulmonary nodule   • Contracture of palmar fascia   • Constipation   • Diverticulitis   • Gastroesophageal reflux disease   • Impaired fasting glucose   • Lyme disease   • Tremor   • Tobacco dependence syndrome   • History of prostate cancer   • Thyroid nodule   • Synovial cyst of right popliteal space   • Hyperplasia of prostate   • Ventral hernia without obstruction or gangrene   • Diverticulosis   • Encounter for well adult exam with abnormal findings   • Leucopenia   • Spondylosis of lumbar region without myelopathy or radiculopathy   • Thrombocytopenia (HCC)   • Mild episode of recurrent major depressive disorder ([facility] )   • Medicare annual wellness visit, subsequent   • Malignant neoplasm of upper lobe of right lung ([place] [address] )   • Gross hematuria   • Non compliance w medication regimen   • Urothelial carcinoma of bladder ([place] [address] )   • Severe protein-energy malnutrition ([place] [address] )   • 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)   • Malignant neoplasm of lower lobe of right lung (HCC)   • Hematuria   • Alcohol dependence, daily use (HCC)   • Seborrheic dermatitis of scalp   • Tobacco abuse   • Fall   • Gait abnormality   • Hypothyroidism due to medication   • Closed fracture of second lumbar vertebra (HCC)   • Pressure injury of sacral region, stage 3 ([place] [address] )   • Surgical wound present   • COVID   • Dermatitis associated with incontinence     Past Medical History:   Diagnosis Date   • Anemia     dur to blood loss in the past   • BMI 25 0-25 9,adult 5/24/2019   • BPH (benign prostatic hyperplasia) 2002   • 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[place] [address] )    • Cancer ([place] [address] )     hepatocellular, prostate   • Chronic bronchitis (HCC)    • Chronic bronchitis ([place] [address] ) 7/19/2016    Last Assessment & Plan:  He has symptoms of chronic bronchitis  He would of course benefit from smoking cessation  I have recommended a trial of Spiriva     • Colitis    • COPD (chronic obstructive pulmonary disease) (HCC)     chronic bronchitis   • Decubitus ulcer of sacral region, stage 3 ([facility] ) 3/12/2018   • Diverticulitis    • Essential tremor    • ETOH abuse    • GERD (gastroesophageal reflux disease)    • Gout    • Hearing loss    • Hemorrhoids    • History of Lyme disease    • History of thrombocytopenia     chronic mild   • History of transfusion    • HTN (hypertension)    • Hydrocele    • Hyperlipidemia    • Hypertension    • Hyperthyroidism    • Irritable bowel syndrome    • Liver disease     cirrhosis   • Lyme disease    • Meningioma ([address] )    • Meningioma (HCC)     left frontal- followed by Neurosurgery   • Nicotine abuse     2-3 packs   • Obstructive sleep apnea     unable to tolerate CPAP   • Overweight with body mass index (BMI) of 25 to 25 9 in adult 11/3/2021   • Pressure injury of skin     Stage 3 that healed and has reoccurred   • Pulmonary nodule    • Sensory polyneuropathy    • Sleep apnea    • Spondylosis of lumbar region without myelopathy or radiculopathy 10/25/2019   • Thyroid nodule    • Tobacco abuse    • Tremor      Past Surgical History:   Procedure Laterality Date   • APPENDECTOMY     • CATARACT EXTRACTION      debra   • ESOPHAGOGASTRODUODENOSCOPY     • HAND SURGERY  04/30/2018   • LIVER SURGERY  03/09/2018   • ME REMV PILONIDAL LESION SIMPLE N/A 9/28/2018    Procedure: PILONIDAL CYSTECTOMY;  Surgeon: Chyrstal Rodriguez DO;  Location: [address] [place] [address];  Service: General   • THYROID SURGERY     • TONSILLECTOMY       Social History     Socioeconomic History   • Marital status:       Spouse name: None   • Number of children: None   • Years of education: None   • Highest education level: None   Occupational History   • Occupation:    Tobacco Use   • Smoking status: Every Day     Packs/day: 1 00     Types: Cigarettes     Start date: 3/25/1961   • Smokeless tobacco: Never   • Tobacco comments:     hx of nicotine abuse 2-3 packs   Vaping Use   • Vaping Use: Never used   Substance and Sexual Activity   • Alcohol use: Yes     Comment: 2-3 oz daily    • Drug use: No   • Sexual activity: Not Currently     Partners: Female   Other Topics Concern   • None   Social History Narrative    Hx of caffeine abuse    Hx of ETOH abuse     Social Determinants of Health     Financial Resource Strain: Not on file   Food Insecurity: Not on file   Transportation Needs: Not on file   Physical Activity: Not on file   Stress: Not on file   Social Connections: Not on file   Intimate Partner Violence: Not on file   Housing Stability: Not on file        Current Outpatient Medications:   •  albuterol (PROVENTIL HFA,VENTOLIN HFA) 90 mcg/act inhaler, INHALE 2 PUFFS BY MOUTH EVERY 6 HOURS AS NEEDED FOR WHEEZING, Disp: 18 g, Rfl: 2  •  budesonide-formoterol (Symbicort) 160-4 5 mcg/act inhaler, Inhale 2 puffs 2 (two) times a day Rinse mouth after use , Disp: 10 2 g, Rfl: 2  •  Cholecalciferol 1000 units CHEW, Chew 5,000 Units daily, Disp: , Rfl:   •  losartan (COZAAR) 50 mg tablet, Take 1 tablet (50 mg total) by mouth daily, Disp: 30 tablet, Rfl: 10  Family History   Problem Relation Age of Onset   • Diabetes Father    • Other Father         colon problems/colostomy   • Alcohol abuse Sister               Coordination of Care: Wound team aware of the treatment plan  Facility nurse will provide wound treatment and monitor the wound for any changes  Patient / Staff education : Patient / Staff was given education on sign of infection and pressure ulcer prevention  Patient/ Staff verbalized understanding     Follow up :  Sign off  Facility staff will continue to provide treatment and monitor the wound  Can reconsult wound nurse practitioner if wound failed to heal or worsened  Voice-recognition software may have been used in the preparation of this document  Occasional wrong word or "sound-alike" substitutions may have occurred due to the inherent limitations of voice recognition software  Interpretation should be guided by context        Juju Craft

## 2022-12-20 NOTE — ASSESSMENT & PLAN NOTE
Buttocks  Patient is incontinent of bowel and bladder this past week  Use of diaper is not a contributing factor   -Red, blanchable, excoriated, improved compared to last week  -Local wound care -changed to Z guard  -Continue to offload  -Sign off  Facility staff will continue to provide treatment and monitor the wound  Can reconsult wound nurse practitioner if wound failed to heal or worsened

## 2022-12-20 NOTE — ASSESSMENT & PLAN NOTE
History :  - LEFT FEMORAL ARTERY THROMBECTOMY, LEFT LOWER EXTREMITY THROMBOEMBOLECTOMY AND FASCIOTOMIES, RIGHT TO LEFT FEMORAL-FEMORAL BYPASS on 9/11/2022    -Wound covered with scab, superficial  -Skin-Prep until scab completely fell off  -Sign off  Facility staff will continue to provide treatment and monitor the wound  Can reconsult wound nurse practitioner if wound failed to heal or worsened

## 2022-12-27 ENCOUNTER — NURSING HOME VISIT (OUTPATIENT)
Dept: WOUND CARE | Facility: HOSPITAL | Age: 82
End: 2022-12-27

## 2022-12-27 DIAGNOSIS — L25.8 DERMATITIS ASSOCIATED WITH INCONTINENCE: Primary | ICD-10-CM

## 2022-12-27 DIAGNOSIS — R32 DERMATITIS ASSOCIATED WITH INCONTINENCE: Primary | ICD-10-CM

## 2022-12-27 DIAGNOSIS — T14.8XXA SURGICAL WOUND PRESENT: ICD-10-CM

## 2022-12-27 NOTE — PROGRESS NOTES
[address] MANAGEMENT   AND HYPERBARIC MEDICINE CENTER       Patient ID: Fawn Mueller is a 81 y o  male Date of Birth 1940     Location of Service: [facility]    Performed wound round with: Wound team     Chief Complaint : Buttocks and left lower leg    Wound Instructions:  Wound: Buttocks  Discontinue previous wound order  Cleanse the skin with soap and water, pat dry  Apply Z guard to skin  Frequency : twice a day and prn for soiling    Location:  Left lower leg  Cleansed with normal saline solution  Apply Skin-Prep to periwound area and wound bed  Daily and as needed for soiling    Offload all wounds  Turn and reposition frequently  Increase protein intake  Monitor for any sign of infection or worsening, inform PCP or patient's primary physician in your facility  Allergies  Pneumococcal vaccine      Assessment & Plan:  1  Dermatitis associated with incontinence  Assessment & Plan:  Buttocks  Patient is incontinent of bowel and bladder this past week  Use of diaper is not a contributing factor   -Wound is healed  -Ordered Z guard for moisture management  -Continue to offload  -Sign off  Facility staff will continue to provide treatment and monitor the wound  Can reconsult wound nurse practitioner if wound failed to heal or worsened  2  Surgical wound present  Assessment & Plan:  History :  - LEFT FEMORAL ARTERY THROMBECTOMY, LEFT LOWER EXTREMITY THROMBOEMBOLECTOMY AND FASCIOTOMIES, RIGHT TO LEFT FEMORAL-FEMORAL BYPASS on 9/11/2022    -Wound covered with scab, superficial  -Skin-Prep until scab completely fell off  -Sign off  Facility staff will continue to provide treatment and monitor the wound  Can reconsult wound nurse practitioner if wound failed to heal or worsened  Subjective:   9/20/2022This is a [de-identified] y o , male referred to our service for wound/ skin alterations on coccyx and left lower leg  Patient have a complex medical history including but not limited to  Cancer [facility]), COPD (chronic obstructive pulmonary disease) ([facility] [facility] ), Decubitus ulcer of sacral region, stage 3 ([facility] [address] ), Diverticulitis, ETOH abuse and Nicotine abuse   Patient was referred by Senior Care Team  Patient was seen in collaboration with the facility wound team      Wound History:   As per medical record review, patient had critical limb ischemia and had LEFT FEMORAL ARTERY THROMBECTOMY, LEFT LOWER EXTREMITY THROMBOEMBOLECTOMY AND FASCIOTOMIES, RIGHT TO LEFT FEMORAL-FEMORAL BYPASS on 9/11/2022  The wound on the coccyx is chronic  Received patient in bed, seems comfortable  Denies pain  Current treatment on wound is dry sterile dressing  Patient currently on isoloation for COVID     9/27/2022 Follow up for wound on the buttocks and left groin  Received patient, not in distress  Facility staff did not report any significant issues related to the wound  Denies pain, no fever  10/4/2022 Follow up for wound on the coccyx  Received patient, not in distress  Facility staff did not report any significant issues related to the wound  As per report, patient still on Bactrim for infection on the left groin  Patient will be seen by surgeon today at 7 pm     10/11/2022 Follow up for wound on the coccyx    Received patient, not in distress  Facility staff did not report any significant issues related to the wound  Denies pain  10/18/2022 Follow up for wound on the coccyx   Received patient, not in distress  Facility staff did not report any significant issues related to the wound  No new issues  10/25/2022 Follow up for wound on the groin and left lower leg   Received patient, not in distress  Facility staff did not report any significant issues related to the wound  As per report, patient was recently seen by surgeon and suture was removed  11/1/2022 Follow up for wound on the left groin, left lower leg   Received patient, not in distress   Facility staff did not report any significant issues related to the wound  Denies pain  11/8/2022 Follow up for wound on the left groin and left lower leg   Received patient, not in distress  Facility staff did not report any significant issues related to the wound  As per report, the collagen dressing still not available  Patient denies pain  11/15/2022 Follow up for wound on the left groin and left lower leg   Received patient, not in distress  Facility staff did not report any significant issues related to the wound  As per report, patient had cellulitis on the wound on the left groin and completed oral antibiotic  Denies pain  Denies fever  11/22/2022 Follow up for wound on the left groin and left lower leg   Received patient, not in distress  Facility staff did not report any significant issues related to the wound  12/6/2022 Follow up for wound on the left lower leg   Received patient, not in distress  Facility staff did not report any significant issues related to the wound  Denies pain  12/13/2022 Follow up for wound on the left lower leg and new consult for wound on the buttocks  Received patient, not in distress  As per report, patient is incontinent of bowel and bladder this past week  12/20/2022 Follow up for wound on the left lower leg and buttocks  Received patient, not in distress  As per report, patient had been incontinent in his briefs and had not been going to the bathroom  12/27/2022 Follow up for wound on the left lower legs and buttocks  Received patient, not in distress  Facility staff did not report any significant issues related to the wound  As per report, patient improved  Has been going to the bathroom  This patient was discharged last week, however, the facility requested that I see the patient again due to excoriation on the buttocks  Review of Systems   Constitutional: Negative  HENT: Negative  Eyes: Negative  Respiratory: Negative  Cardiovascular: Negative  Gastrointestinal: Negative  Endocrine: Negative  Genitourinary: Negative  Musculoskeletal: Positive for gait problem  Skin: Positive for wound  See HPI   Hematological: Negative  Psychiatric/Behavioral: Negative  All other systems reviewed and are negative  Objective:    Physical Exam  Constitutional:       Appearance: Normal appearance  HENT:      Head: Normocephalic and atraumatic  Nose: Nose normal       Mouth/Throat:      Mouth: Mucous membranes are moist    Eyes:      Conjunctiva/sclera: Conjunctivae normal    Pulmonary:      Effort: Pulmonary effort is normal    Abdominal:      Tenderness: There is no abdominal tenderness  Genitourinary:     Comments: incontinent  Musculoskeletal:      Cervical back: Normal range of motion  Comments: LROM   Skin:     Coloration: Pallor: xh       Findings: No lesion  Comments: Right groin - surgical incision healed  Left groin - wound - healed  Left lower leg medial -scab, superficial   left lower leg lateral -healed  Buttocks-wound healed   Neurological:      Mental Status: He is alert  Gait: Gait abnormal    Psychiatric:         Mood and Affect: Mood normal          Behavior: Behavior normal               Procedures           Patient's care was coordinated with nursing facility staff  Recent vitals, labs and updated medications were reviewed on EMR or chart system of facility  Past Medical, surgical, social, medication and allergy history and patient's previous records were reviewed and updated as appropriate: Most up-to date information is available in the facility EMR where the patient is currently admitted      Patient Active Problem List   Diagnosis   • Tremor, essential   • Other specified polyneuropathies   • Malignant neoplasm of prostate [facility])   • Chronic pansinusitis   • Alcoholic cirrhosis of liver (HCC)   • COPD (chronic obstructive pulmonary disease) ([facility] )   • Gout   • Hepatocellular carcinoma ([facility] )   • Hypertension   • LIANA (obstructive sleep apnea)   • Pulmonary nodule   • Contracture of palmar fascia   • Constipation   • Diverticulitis   • Gastroesophageal reflux disease   • Impaired fasting glucose   • Lyme disease   • Tremor   • Tobacco dependence syndrome   • History of prostate cancer   • Thyroid nodule   • Synovial cyst of right popliteal space   • Hyperplasia of prostate   • Ventral hernia without obstruction or gangrene   • Diverticulosis   • Encounter for well adult exam with abnormal findings   • Leucopenia   • Spondylosis of lumbar region without myelopathy or radiculopathy   • Thrombocytopenia (HCC)   • Mild episode of recurrent major depressive disorder ([place] [address] )   • Medicare annual wellness visit, subsequent   • Malignant neoplasm of upper lobe of right lung ([place] [address] )   • Gross hematuria   • Non compliance w medication regimen   • Urothelial carcinoma of bladder ([place] [address] )   • Severe protein-energy malnutrition ([place] [address] )   • 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)   • Malignant neoplasm of lower lobe of right lung (HCC)   • Hematuria   • Alcohol dependence, daily use (HCC)   • Seborrheic dermatitis of scalp   • Tobacco abuse   • Fall   • Gait abnormality   • Hypothyroidism due to medication   • Closed fracture of second lumbar vertebra (HCC)   • Pressure injury of sacral region, stage 3 ([place] [address] )   • Surgical wound present   • COVID   • Dermatitis associated with incontinence     Past Medical History:   Diagnosis Date   • Anemia     dur to blood loss in the past   • BMI 25 0-25 9,adult 5/24/2019   • BPH (benign prostatic hyperplasia) 2002   • 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[place] [address] )    • Cancer (HCC)     hepatocellular, prostate   • Chronic bronchitis (HCC)    • Chronic bronchitis ([place] [address] ) 7/19/2016    Last Assessment & Plan:  He has symptoms of chronic bronchitis  He would of course benefit from smoking cessation  I have recommended a trial of Spiriva     • Colitis    • COPD (chronic obstructive pulmonary disease) (HCC)     chronic bronchitis   • Decubitus ulcer of sacral region, stage 3 ([facility] [address] ) 3/12/2018   • Diverticulitis    • Essential tremor    • ETOH abuse    • GERD (gastroesophageal reflux disease)    • Gout    • Hearing loss    • Hemorrhoids    • History of Lyme disease    • History of thrombocytopenia     chronic mild   • History of transfusion    • HTN (hypertension)    • Hydrocele    • Hyperlipidemia    • Hypertension    • Hyperthyroidism    • Irritable bowel syndrome    • Liver disease     cirrhosis   • Lyme disease    • Meningioma (HCC)    • Meningioma (HCC)     left frontal- followed by Neurosurgery   • Nicotine abuse     2-3 packs   • Obstructive sleep apnea     unable to tolerate CPAP   • Overweight with body mass index (BMI) of 25 to 25 9 in adult 11/3/2021   • Pressure injury of skin     Stage 3 that healed and has reoccurred   • Pulmonary nodule    • Sensory polyneuropathy    • Sleep apnea    • Spondylosis of lumbar region without myelopathy or radiculopathy 10/25/2019   • Thyroid nodule    • Tobacco abuse    • Tremor      Past Surgical History:   Procedure Laterality Date   • APPENDECTOMY     • CATARACT EXTRACTION      debra   • ESOPHAGOGASTRODUODENOSCOPY     • HAND SURGERY  04/30/2018   • LIVER SURGERY  03/09/2018   • IN EXCISION PILONIDAL CYST/SINUS SIMPLE N/A 9/28/2018    Procedure: PILONIDAL CYSTECTOMY;  Surgeon: Kemar Costello DO;  Location: [address] [place] [address];  Service: General   • THYROID SURGERY     • TONSILLECTOMY       Social History     Socioeconomic History   • Marital status:       Spouse name: None   • Number of children: None   • Years of education: None   • Highest education level: None   Occupational History   • Occupation:    Tobacco Use   • Smoking status: Every Day     Packs/day: 1 00     Types: Cigarettes     Start date: 3/25/1961   • Smokeless tobacco: Never   • Tobacco comments:     hx of nicotine abuse 2-3 packs   Vaping Use   • Vaping Use: Never used   Substance and Sexual Activity   • Alcohol use: Yes     Comment: 2-3 oz daily    • Drug use: No   • Sexual activity: Not Currently     Partners: Female   Other Topics Concern   • None   Social History Narrative    Hx of caffeine abuse    Hx of ETOH abuse     Social Determinants of Health     Financial Resource Strain: Not on file   Food Insecurity: Not on file   Transportation Needs: Not on file   Physical Activity: Not on file   Stress: Not on file   Social Connections: Not on file   Intimate Partner Violence: Not on file   Housing Stability: Not on file        Current Outpatient Medications:   •  albuterol (PROVENTIL HFA,VENTOLIN HFA) 90 mcg/act inhaler, INHALE 2 PUFFS BY MOUTH EVERY 6 HOURS AS NEEDED FOR WHEEZING, Disp: 18 g, Rfl: 2  •  budesonide-formoterol (Symbicort) 160-4 5 mcg/act inhaler, Inhale 2 puffs 2 (two) times a day Rinse mouth after use , Disp: 10 2 g, Rfl: 2  •  Cholecalciferol 1000 units CHEW, Chew 5,000 Units daily, Disp: , Rfl:   •  losartan (COZAAR) 50 mg tablet, Take 1 tablet (50 mg total) by mouth daily, Disp: 30 tablet, Rfl: 10  Family History   Problem Relation Age of Onset   • Diabetes Father    • Other Father         colon problems/colostomy   • Alcohol abuse Sister               Coordination of Care: Wound team aware of the treatment plan  Facility nurse will provide wound treatment and monitor the wound for any changes  Patient / Staff education : Patient / Staff was given education on sign of infection and pressure ulcer prevention  Patient/ Staff verbalized understanding     Follow up :  Sign off  Facility staff will continue to provide treatment and monitor the wound  Can reconsult wound nurse practitioner if wound failed to heal or worsened  Voice-recognition software may have been used in the preparation of this document  Occasional wrong word or "sound-alike" substitutions may have occurred due to the inherent limitations of voice recognition software  Interpretation should be guided by context        Kati Card

## 2022-12-27 NOTE — ASSESSMENT & PLAN NOTE
Buttocks  Patient is incontinent of bowel and bladder this past week  Use of diaper is not a contributing factor   -Wound is healed  -Ordered Z guard for moisture management  -Continue to offload  -Sign off  Facility staff will continue to provide treatment and monitor the wound  Can reconsult wound nurse practitioner if wound failed to heal or worsened

## 2023-02-21 ENCOUNTER — NURSING HOME VISIT (OUTPATIENT)
Dept: WOUND CARE | Facility: HOSPITAL | Age: 83
End: 2023-02-21

## 2023-02-21 DIAGNOSIS — L25.8 DERMATITIS ASSOCIATED WITH INCONTINENCE: Primary | ICD-10-CM

## 2023-02-21 DIAGNOSIS — E43 SEVERE PROTEIN-ENERGY MALNUTRITION (HCC): ICD-10-CM

## 2023-02-21 DIAGNOSIS — R32 DERMATITIS ASSOCIATED WITH INCONTINENCE: Primary | ICD-10-CM

## 2023-02-22 NOTE — ASSESSMENT & PLAN NOTE
Buttocks  -This wound started as dermatitis that was healed  Patient's health decline, grossly incontinent, and had poor appetite  The wound reopened   -Wound is covered with necrotic tissue with purple discoloration    Considering the patient's health decline, this wound probably a skin failure   -Local wound care with Triad and bordered foam  -Patient is for referral to hospice  -Follow-up next week

## 2023-02-22 NOTE — PROGRESS NOTES
[address] MANAGEMENT   AND HYPERBARIC MEDICINE CENTER       Patient ID: Tiffanie Meadows is a 81 y o  male Date of Birth 1940     Location of Service: [place] [address]    Performed wound round with: Wound team     Chief Complaint : Buttocks    Wound Instructions:  Wound: Buttocks  Discontinue previous wound order  Cleanse the skin with soap and water, pat dry  Apply Triad paste to wound bed and cover with bordered foam  Frequency :daily and prn for soiling    Offload all wounds  Turn and reposition frequently  Increase protein intake  Monitor for any sign of infection or worsening, inform PCP or patient's primary physician in your facility  Allergies  Pneumococcal vaccine      Assessment & Plan:  1  Dermatitis associated with incontinence  Assessment & Plan:  Buttocks  -This wound started as dermatitis that was healed  Patient's health decline, grossly incontinent, and had poor appetite  The wound reopened   -Wound is covered with necrotic tissue with purple discoloration  Considering the patient's health decline, this wound probably a skin failure   -Local wound care with Triad and bordered foam  -Patient is for referral to hospice  -Follow-up next week      2  Severe protein-energy malnutrition ([address] )  Assessment & Plan:  Currently on [employer] protein supplement               Subjective:   2/21/2023  This is a new consult for wound on the sacrum  As per report, the patient health is declining  He have a history of dermatitis on the buttocks and currently on Z guard for moisture management and on pressure ulcer prevention protocol in the facility  Patient is for referral to hospice  He have poor appetite and grossly incontinent  Review of Systems   Musculoskeletal: Positive for gait problem  Skin: Positive for wound  See HPI       Objective:    Physical Exam  Constitutional:       Appearance: He is ill-appearing  HENT:      Head: Normocephalic  Pulmonary:      Effort: Pulmonary effort is normal    Abdominal:      Tenderness: There is no abdominal tenderness  Genitourinary:     Comments: Incontinent  + hematuria  Musculoskeletal:      Comments: LROM   Skin:     Coloration: Pallor: xh       Findings: No lesion  Comments: Buttocks -wound size is 5 x 4 x 0 1 cm ,  80% purple, 20% slough, periwound is macerated, with no obvious sign of infection, denies pain   Neurological:      Gait: Gait abnormal    Psychiatric:         Mood and Affect: Mood normal          Behavior: Behavior normal               Procedures           Patient's care was coordinated with nursing facility staff  Recent vitals, labs and updated medications were reviewed on EMR or chart system of facility  Past Medical, surgical, social, medication and allergy history and patient's previous records were reviewed and updated as appropriate: Most up-to date information is available in the facility EMR where the patient is currently admitted      Patient Active Problem List   Diagnosis   • Tremor, essential   • Other specified polyneuropathies   • Malignant neoplasm of prostate ([facility] [address] )   • Chronic pansinusitis   • Alcoholic cirrhosis of liver (HCC)   • COPD (chronic obstructive pulmonary disease) (HCC)   • Gout   • Hepatocellular carcinoma (HCC)   • Hypertension   • LIANA (obstructive sleep apnea)   • Pulmonary nodule   • Contracture of palmar fascia   • Constipation   • Diverticulitis   • Gastroesophageal reflux disease   • Impaired fasting glucose   • Lyme disease   • Tremor   • Tobacco dependence syndrome   • History of prostate cancer   • Thyroid nodule   • Synovial cyst of right popliteal space   • Hyperplasia of prostate   • Ventral hernia without obstruction or gangrene   • Diverticulosis   • Encounter for well adult exam with abnormal findings   • Leucopenia   • Spondylosis of lumbar region without myelopathy or radiculopathy   • Thrombocytopenia (HCC)   • Mild episode of recurrent major depressive disorder ([place] [address] )   • Medicare annual wellness visit, subsequent   • Malignant neoplasm of upper lobe of right lung ([place] [address] )   • Gross hematuria   • Non compliance w medication regimen   • Urothelial carcinoma of bladder ([place] [address] )   • Severe protein-energy malnutrition ([place] [address] )   • 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 [facility])   • Malignant neoplasm of lower lobe of right lung (HCC)   • Hematuria   • Alcohol dependence, daily use (HCC)   • Seborrheic dermatitis of scalp   • Tobacco abuse   • Fall   • Gait abnormality   • Hypothyroidism due to medication   • Closed fracture of second lumbar vertebra (HCC)   • Pressure injury of sacral region, stage 3 ([place] [address] )   • Surgical wound present   • COVID   • Dermatitis associated with incontinence     Past Medical History:   Diagnosis Date   • Anemia     dur to blood loss in the past   • BMI 25 0-25 9,adult 5/24/2019   • BPH (benign prostatic hyperplasia) 2002   • 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[place] [address] )    • Cancer (HCC)     hepatocellular, prostate   • Chronic bronchitis (HCC)    • Chronic bronchitis ([place] [address] ) 7/19/2016    Last Assessment & Plan:  He has symptoms of chronic bronchitis  He would of course benefit from smoking cessation  I have recommended a trial of Spiriva     • Colitis    • COPD (chronic obstructive pulmonary disease) (HCC)     chronic bronchitis   • Decubitus ulcer of sacral region, stage 3 ([place] [address] ) 3/12/2018   • Diverticulitis    • Essential tremor    • ETOH abuse    • GERD (gastroesophageal reflux disease)    • Gout    • Hearing loss    • Hemorrhoids    • History of Lyme disease    • History of thrombocytopenia     chronic mild   • History of transfusion    • HTN (hypertension)    • Hydrocele    • Hyperlipidemia    • Hypertension    • Hyperthyroidism    • Irritable bowel syndrome    • Liver disease     cirrhosis   • Lyme disease    • Meningioma (HCC)    • Meningioma (HCC)     left frontal- followed by Neurosurgery   • Nicotine abuse     2-3 packs   • Obstructive sleep apnea     unable to tolerate CPAP   • Overweight with body mass index (BMI) of 25 to 25 9 in adult 11/3/2021   • Pressure injury of skin     Stage 3 that healed and has reoccurred   • Pulmonary nodule    • Sensory polyneuropathy    • Sleep apnea    • Spondylosis of lumbar region without myelopathy or radiculopathy 10/25/2019   • Thyroid nodule    • Tobacco abuse    • Tremor      Past Surgical History:   Procedure Laterality Date   • APPENDECTOMY     • CATARACT EXTRACTION      debra   • ESOPHAGOGASTRODUODENOSCOPY     • HAND SURGERY  04/30/2018   • LIVER SURGERY  03/09/2018   • AL EXCISION PILONIDAL CYST/SINUS SIMPLE N/A 9/28/2018    Procedure: PILONIDAL CYSTECTOMY;  Surgeon: Asher Grider DO;  Location: [facility] MAIN OR;  Service: General   • THYROID SURGERY     • TONSILLECTOMY       Social History     Socioeconomic History   • Marital status:       Spouse name: None   • Number of children: None   • Years of education: None   • Highest education level: None   Occupational History   • Occupation:    Tobacco Use   • Smoking status: Every Day     Packs/day: 1 00     Types: Cigarettes     Start date: 3/25/1961   • Smokeless tobacco: Never   • Tobacco comments:     hx of nicotine abuse 2-3 packs   Vaping Use   • Vaping Use: Never used   Substance and Sexual Activity   • Alcohol use: Yes     Comment: 2-3 oz daily    • Drug use: No   • Sexual activity: Not Currently     Partners: Female   Other Topics Concern   • None   Social History Narrative    Hx of caffeine abuse    Hx of ETOH abuse     Social Determinants of Health     Financial Resource Strain: Not on file   Food Insecurity: Not on file   Transportation Needs: Not on file   Physical Activity: Not on file   Stress: Not on file   Social Connections: Not on file   Intimate Partner Violence: Not on file   Housing Stability: Not on file        Current Outpatient Medications:   •  albuterol (PROVENTIL HFA,VENTOLIN HFA) 90 mcg/act inhaler, INHALE 2 PUFFS BY MOUTH EVERY 6 HOURS AS NEEDED FOR WHEEZING, Disp: 18 g, Rfl: 2  •  budesonide-formoterol (Symbicort) 160-4 5 mcg/act inhaler, Inhale 2 puffs 2 (two) times a day Rinse mouth after use , Disp: 10 2 g, Rfl: 2  •  Cholecalciferol 1000 units CHEW, Chew 5,000 Units daily, Disp: , Rfl:   •  losartan (COZAAR) 50 mg tablet, Take 1 tablet (50 mg total) by mouth daily, Disp: 30 tablet, Rfl: 10  Family History   Problem Relation Age of Onset   • Diabetes Father    • Other Father         colon problems/colostomy   • Alcohol abuse Sister               Coordination of Care: Wound team aware of the treatment plan  Facility nurse will provide wound treatment and monitor the wound for any changes  Patient / Staff education : Patient / Staff was given education on sign of infection and pressure ulcer prevention  Patient/ Staff verbalized understanding     Follow up :  Next week    Voice-recognition software may have been used in the preparation of this document  Occasional wrong word or "sound-alike" substitutions may have occurred due to the inherent limitations of voice recognition software  Interpretation should be guided by context        Karyn Campos

## 2023-02-28 ENCOUNTER — NURSING HOME VISIT (OUTPATIENT)
Dept: WOUND CARE | Facility: HOSPITAL | Age: 83
End: 2023-02-28

## 2023-02-28 DIAGNOSIS — L25.8 DERMATITIS ASSOCIATED WITH INCONTINENCE: Primary | ICD-10-CM

## 2023-02-28 DIAGNOSIS — R32 DERMATITIS ASSOCIATED WITH INCONTINENCE: Primary | ICD-10-CM

## 2023-02-28 DIAGNOSIS — E43 SEVERE PROTEIN-ENERGY MALNUTRITION (HCC): ICD-10-CM

## 2023-03-01 NOTE — ASSESSMENT & PLAN NOTE
The wound on the buttocks significantly healed, with no obvious sign of infection  Continue local wound care with Triad paste  Continue to offload and on air mattress  Follow-up : 2 weeks

## 2023-03-01 NOTE — PROGRESS NOTES
[address] MANAGEMENT   AND HYPERBARIC MEDICINE CENTER       Patient ID: Fawn Mueller is a 81 y o  male Date of Birth 1940     Location of Service: [place] [address]    Performed wound round with: Wound team     Chief Complaint : Buttocks    Wound Instructions:  Wound: Buttocks  Discontinue previous wound order  Cleanse the skin with soap and water, pat dry  Apply Triad paste to wound bed and cover with bordered foam  Frequency :daily and prn for soiling    Offload all wounds  Turn and reposition frequently  Increase protein intake  Monitor for any sign of infection or worsening, inform PCP or patient's primary physician in your facility  Allergies  Pneumococcal vaccine      Assessment & Plan:  1  Dermatitis associated with incontinence  Assessment & Plan: The wound on the buttocks significantly healed, with no obvious sign of infection  Continue local wound care with Triad paste  Continue to offload and on air mattress  Follow-up : 2 weeks      2  Severe protein-energy malnutrition ([address] )  Assessment & Plan:  Currently on Hi-Rigo protein supplement               Subjective:   2/21/2023  This is a new consult for wound on the sacrum  As per report, the patient health is declining  He have a history of dermatitis on the buttocks and currently on Z guard for moisture management and on pressure ulcer prevention protocol in the facility  Patient is for referral to hospice  He have poor appetite and grossly incontinent  2/28/2023 Follow up for wound on the sacrum  Received patient, not in distress  Facility staff did not report any significant issues related to the wound  Patient still have poor appetite  Review of Systems   Musculoskeletal: Positive for gait problem  Skin: Positive for wound  See HPI       Objective:    Physical Exam  Constitutional:       Appearance: He is ill-appearing  HENT:      Head: Normocephalic     Pulmonary:      Effort: Pulmonary effort is normal    Abdominal:      Tenderness: There is no abdominal tenderness  Genitourinary:     Comments: Incontinent  + hematuria  Musculoskeletal:      Comments: LROM   Skin:     Coloration: Pallor: xh       Findings: No lesion  Comments: Buttocks -wound size is 6 x 5 x 0 1 cm ,  50% healed area, 20% epithelial, 30% slough, small amount of serous drainage, with no obvious sign of infection   Neurological:      Gait: Gait abnormal    Psychiatric:         Mood and Affect: Mood normal          Behavior: Behavior normal               Procedures           Patient's care was coordinated with nursing facility staff  Recent vitals, labs and updated medications were reviewed on EMR or chart system of facility  Past Medical, surgical, social, medication and allergy history and patient's previous records were reviewed and updated as appropriate: Most up-to date information is available in the facility EMR where the patient is currently admitted      Patient Active Problem List   Diagnosis   • Tremor, essential   • Other specified polyneuropathies   • Malignant neoplasm of prostate ([facility] [address] )   • Chronic pansinusitis   • Alcoholic cirrhosis of liver (HCC)   • COPD (chronic obstructive pulmonary disease) (HCC)   • Gout   • Hepatocellular carcinoma (HCC)   • Hypertension   • LIANA (obstructive sleep apnea)   • Pulmonary nodule   • Contracture of palmar fascia   • Constipation   • Diverticulitis   • Gastroesophageal reflux disease   • Impaired fasting glucose   • Lyme disease   • Tremor   • Tobacco dependence syndrome   • History of prostate cancer   • Thyroid nodule   • Synovial cyst of right popliteal space   • Hyperplasia of prostate   • Ventral hernia without obstruction or gangrene   • Diverticulosis   • Encounter for well adult exam with abnormal findings   • Leucopenia   • Spondylosis of lumbar region without myelopathy or radiculopathy   • Thrombocytopenia (HCC)   • Mild episode of recurrent major depressive disorder ([place] [address] )   • Medicare annual wellness visit, subsequent   • Malignant neoplasm of upper lobe of right lung ([place] [address] )   • Gross hematuria   • Non compliance w medication regimen   • Urothelial carcinoma of bladder ([place] [address] )   • Severe protein-energy malnutrition ([place] [address] )   • 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 [facility])   • Malignant neoplasm of lower lobe of right lung (HCC)   • Hematuria   • Alcohol dependence, daily use (HCC)   • Seborrheic dermatitis of scalp   • Tobacco abuse   • Fall   • Gait abnormality   • Hypothyroidism due to medication   • Closed fracture of second lumbar vertebra (HCC)   • Pressure injury of sacral region, stage 3 ([place] [address] )   • Surgical wound present   • COVID   • Dermatitis associated with incontinence     Past Medical History:   Diagnosis Date   • Anemia     dur to blood loss in the past   • BMI 25 0-25 9,adult 5/24/2019   • BPH (benign prostatic hyperplasia) 2002   • 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[place] [address] )    • Cancer (HCC)     hepatocellular, prostate   • Chronic bronchitis (HCC)    • Chronic bronchitis ([place] [address] ) 7/19/2016    Last Assessment & Plan:  He has symptoms of chronic bronchitis  He would of course benefit from smoking cessation  I have recommended a trial of Spiriva     • Colitis    • COPD (chronic obstructive pulmonary disease) (HCC)     chronic bronchitis   • Decubitus ulcer of sacral region, stage 3 ([place] [address] ) 3/12/2018   • Diverticulitis    • Essential tremor    • ETOH abuse    • GERD (gastroesophageal reflux disease)    • Gout    • Hearing loss    • Hemorrhoids    • History of Lyme disease    • History of thrombocytopenia     chronic mild   • History of transfusion    • HTN (hypertension)    • Hydrocele    • Hyperlipidemia    • Hypertension    • Hyperthyroidism    • Irritable bowel syndrome    • Liver disease     cirrhosis   • Lyme disease    • Meningioma (HCC)    • Meningioma (HCC)     left frontal- followed by Neurosurgery   • Nicotine abuse     2-3 packs   • Obstructive sleep apnea     unable to tolerate CPAP   • Overweight with body mass index (BMI) of 25 to 25 9 in adult 11/3/2021   • Pressure injury of skin     Stage 3 that healed and has reoccurred   • Pulmonary nodule    • Sensory polyneuropathy    • Sleep apnea    • Spondylosis of lumbar region without myelopathy or radiculopathy 10/25/2019   • Thyroid nodule    • Tobacco abuse    • Tremor      Past Surgical History:   Procedure Laterality Date   • APPENDECTOMY     • CATARACT EXTRACTION      debra   • ESOPHAGOGASTRODUODENOSCOPY     • HAND SURGERY  04/30/2018   • LIVER SURGERY  03/09/2018   • CT EXCISION PILONIDAL CYST/SINUS SIMPLE N/A 9/28/2018    Procedure: PILONIDAL CYSTECTOMY;  Surgeon: Reji Chang DO;  Location: [address] [place] [address];  Service: General   • THYROID SURGERY     • TONSILLECTOMY       Social History     Socioeconomic History   • Marital status:       Spouse name: None   • Number of children: None   • Years of education: None   • Highest education level: None   Occupational History   • Occupation:    Tobacco Use   • Smoking status: Every Day     Packs/day: 1 00     Types: Cigarettes     Start date: 3/25/1961   • Smokeless tobacco: Never   • Tobacco comments:     hx of nicotine abuse 2-3 packs   Vaping Use   • Vaping Use: Never used   Substance and Sexual Activity   • Alcohol use: Yes     Comment: 2-3 oz daily    • Drug use: No   • Sexual activity: Not Currently     Partners: Female   Other Topics Concern   • None   Social History Narrative    Hx of caffeine abuse    Hx of ETOH abuse     Social Determinants of Health     Financial Resource Strain: Not on file   Food Insecurity: Not on file   Transportation Needs: Not on file   Physical Activity: Not on file   Stress: Not on file   Social Connections: Not on file   Intimate Partner Violence: Not on file   Housing Stability: Not on file        Current Outpatient Medications:   •  albuterol (PROVENTIL HFA,VENTOLIN HFA) 90 mcg/act inhaler, INHALE 2 PUFFS BY MOUTH EVERY 6 HOURS AS NEEDED FOR WHEEZING, Disp: 18 g, Rfl: 2  •  budesonide-formoterol (Symbicort) 160-4 5 mcg/act inhaler, Inhale 2 puffs 2 (two) times a day Rinse mouth after use , Disp: 10 2 g, Rfl: 2  •  Cholecalciferol 1000 units CHEW, Chew 5,000 Units daily, Disp: , Rfl:   •  losartan (COZAAR) 50 mg tablet, Take 1 tablet (50 mg total) by mouth daily, Disp: 30 tablet, Rfl: 10  Family History   Problem Relation Age of Onset   • Diabetes Father    • Other Father         colon problems/colostomy   • Alcohol abuse Sister               Coordination of Care: Wound team aware of the treatment plan  Facility nurse will provide wound treatment and monitor the wound for any changes  Patient / Staff education : Patient / Staff was given education on sign of infection and pressure ulcer prevention  Patient/ Staff verbalized understanding     Follow up :  2 weeks    Voice-recognition software may have been used in the preparation of this document  Occasional wrong word or "sound-alike" substitutions may have occurred due to the inherent limitations of voice recognition software  Interpretation should be guided by context        Keisha Conde

## 2023-03-14 ENCOUNTER — NURSING HOME VISIT (OUTPATIENT)
Dept: WOUND CARE | Facility: HOSPITAL | Age: 83
End: 2023-03-14

## 2023-03-14 DIAGNOSIS — Z51.5 HOSPICE CARE PATIENT: ICD-10-CM

## 2023-03-14 DIAGNOSIS — L89.154 PRESSURE INJURY OF SACRAL REGION, STAGE 4 (HCC): Primary | ICD-10-CM

## 2023-03-14 NOTE — PROGRESS NOTES
[address] MANAGEMENT   AND HYPERBARIC MEDICINE CENTER       Patient ID: Simran Echevarria is a 81 y o  male Date of Birth 1940     Location of Service: [place] [address]    Performed wound round with: Wound team     Chief Complaint : Buttocks    Wound Instructions:  Wound: Buttocks  Discontinue previous wound order  Cleanse the skin with Dakin's quarter strenth  Gently apply gauze moistened with Dakin's quarter strength to wound bed and cover with bordered foam or ABD pad  Frequency : Twice a day and prn for soiling    Offload all wounds  Turn and reposition frequently  Increase protein intake  Monitor for any sign of infection or worsening, inform PCP or patient's primary physician in your facility  Allergies  Pneumococcal vaccine      Assessment & Plan:  1  Pressure injury of sacral region, stage 4 ([facility] [address] )  Assessment & Plan:  S[place]  -Patient health is declining, the wound continues to worsened, covered with necrotic tissue  -This is probably Loly Flax ulcer  -Change local wound care with Dakin's quarter strength packing  -Currently on hospice care, goal of treatment is palliative  -Continue to offload  -Follow-up as per hospice referral      2  Hospice care patient  Assessment & Plan:  Palliative care plan was chosen  Based on this plan, there is no expectation of healing  The end goals of our palliative care plan are pain control, drainage management, prevention of infection, odor control, and optimization of quality of life insofar as the wound will allow  Therefore, invasive procedures related to the wound will be minimized  Dressings will be chosen to achieve the palliative care plan goals rather that to achieve healing of the wound    Debridements may be performed periodically in order to prevent infection or control odor, etc   Visits will be scheduled accordingly to minimize disruption of quality of life while allowing appropriate provider oversight of the wound and any dramatic changes that might occur  Subjective:   2/21/2023  This is a new consult for wound on the sacrum  As per report, the patient health is declining  He have a history of dermatitis on the buttocks and currently on Z guard for moisture management and on pressure ulcer prevention protocol in the facility  Patient is for referral to hospice  He have poor appetite and grossly incontinent  2/28/2023 Follow up for wound on the sacrum  Received patient, not in distress  Facility staff did not report any significant issues related to the wound  Patient still have poor appetite  3/14/2023 Follow up for wound on the sacrum   Patient currently on hospice care  [facility]  As per report, patient's health continues to decline  Review of Systems   Musculoskeletal: Positive for gait problem  Skin: Positive for wound  See HPI       Objective:    Physical Exam  Constitutional:       Appearance: He is ill-appearing  HENT:      Head: Normocephalic  Pulmonary:      Effort: Pulmonary effort is normal    Abdominal:      Tenderness: There is no abdominal tenderness  Genitourinary:     Comments: Incontinent  Musculoskeletal:      Comments: LROM   Skin:     Coloration: Skin is pale (xh)  Findings: No lesion  Comments: Buttocks -wound size 8 x 11 x 1 5 cm ,  100% necrotic tissue, positive malodor, periwound is slightly macerated, no redness   Neurological:      Gait: Gait abnormal    Psychiatric:         Mood and Affect: Mood normal          Behavior: Behavior normal               Procedures           Patient's care was coordinated with nursing facility staff  Recent vitals, labs and updated medications were reviewed on EMR or chart system of facility   Past Medical, surgical, social, medication and allergy history and patient's previous records were reviewed and updated as appropriate: Most up-to date information is available in the facility EMR where the patient is currently admitted      Patient Active Problem List   Diagnosis   • Tremor, essential   • Other specified polyneuropathies   • Malignant neoplasm of prostate ([place] [address] )   • Chronic pansinusitis   • Alcoholic cirrhosis of liver (HCC)   • COPD (chronic obstructive pulmonary disease) ([facility] )   • Decubitus ulcer of sacral region, stage 3 (HCC)   • Gout   • Hepatocellular carcinoma (HCC)   • Hypertension   • LIANA (obstructive sleep apnea)   • Pulmonary nodule   • Contracture of palmar fascia   • Constipation   • Diverticulitis   • Gastroesophageal reflux disease   • Impaired fasting glucose   • Lyme disease   • Tremor   • Tobacco dependence syndrome   • History of prostate cancer   • Thyroid nodule   • Synovial cyst of right popliteal space   • Hyperplasia of prostate   • Ventral hernia without obstruction or gangrene   • Diverticulosis   • Encounter for well adult exam with abnormal findings   • Leucopenia   • Spondylosis of lumbar region without myelopathy or radiculopathy   • Thrombocytopenia (HCC)   • Mild episode of recurrent major depressive disorder ([place] [address] )   • Medicare annual wellness visit, subsequent   • Malignant neoplasm of upper lobe of right lung ([place] [address] )   • Gross hematuria   • Non compliance w medication regimen   • Urothelial carcinoma of bladder ([place] [address] )   • Severe protein-energy malnutrition (HCC)   • 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)   • Malignant neoplasm of lower lobe of right lung (HCC)   • Hematuria   • Alcohol dependence, daily use (HCC)   • Seborrheic dermatitis of scalp   • Tobacco abuse   • Fall   • Gait abnormality   • Hypothyroidism due to medication   • Closed fracture of second lumbar vertebra (HCC)   • Pressure injury of sacral region, stage 4 (HCC)   • Surgical wound present   • COVID   • Dermatitis associated with incontinence   • Hospice care patient     Past Medical History:   Diagnosis Date   • Anemia     dur to blood loss in the past   • BMI 25 0-25 9,adult 5/24/2019   • BPH (benign prostatic hyperplasia) 2002   • 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HCC)    • Cancer ([facility] [address] )     hepatocellular, prostate   • Chronic bronchitis (HCC)    • Chronic bronchitis ([facility] ) 7/19/2016    Last Assessment & Plan:  He has symptoms of chronic bronchitis  He would of course benefit from smoking cessation  I have recommended a trial of Spiriva  • Colitis    • COPD (chronic obstructive pulmonary disease) (HCC)     chronic bronchitis   • Decubitus ulcer of sacral region, stage 3 (HCC) 3/12/2018   • Diverticulitis    • Essential tremor    • ETOH abuse    • GERD (gastroesophageal reflux disease)    • Gout    • Hearing loss    • Hemorrhoids    • History of Lyme disease    • History of thrombocytopenia     chronic mild   • History of transfusion    • HTN (hypertension)    • Hydrocele    • Hyperlipidemia    • Hypertension    • Hyperthyroidism    • Irritable bowel syndrome    • Liver disease     cirrhosis   • Lyme disease    • Meningioma (HCC)    • Meningioma (HCC)     left frontal- followed by Neurosurgery   • Nicotine abuse     2-3 packs   • Obstructive sleep apnea     unable to tolerate CPAP   • Overweight with body mass index (BMI) of 25 to 25 9 in adult 11/3/2021   • Pressure injury of skin     Stage 3 that healed and has reoccurred   • Pulmonary nodule    • Sensory polyneuropathy    • Sleep apnea    • Spondylosis of lumbar region without myelopathy or radiculopathy 10/25/2019   • Thyroid nodule    • Tobacco abuse    • Tremor      Past Surgical History:   Procedure Laterality Date   • APPENDECTOMY     • CATARACT EXTRACTION      debra   • ESOPHAGOGASTRODUODENOSCOPY     • HAND SURGERY  04/30/2018   • LIVER SURGERY  03/09/2018   • LA EXCISION PILONIDAL CYST/SINUS SIMPLE N/A 9/28/2018    Procedure: PILONIDAL CYSTECTOMY;  Surgeon: Ashley Donnelly DO;  Location: [facility] MAIN OR;  Service: General   • THYROID SURGERY     • TONSILLECTOMY       Social History     Socioeconomic History   • Marital status:       Spouse name: None   • Number of children: None   • Years of education: None   • Highest education level: None   Occupational History   • Occupation:    Tobacco Use   • Smoking status: Every Day     Packs/day: 1 00     Types: Cigarettes     Start date: 3/25/1961   • Smokeless tobacco: Never   • Tobacco comments:     hx of nicotine abuse 2-3 packs   Vaping Use   • Vaping Use: Never used   Substance and Sexual Activity   • Alcohol use: Yes     Comment: 2-3 oz daily    • Drug use: No   • Sexual activity: Not Currently     Partners: Female   Other Topics Concern   • None   Social History Narrative    Hx of caffeine abuse    Hx of ETOH abuse     Social Determinants of Health     Financial Resource Strain: Not on file   Food Insecurity: Not on file   Transportation Needs: Not on file   Physical Activity: Not on file   Stress: Not on file   Social Connections: Not on file   Intimate Partner Violence: Not on file   Housing Stability: Not on file        Current Outpatient Medications:   •  albuterol (PROVENTIL HFA,VENTOLIN HFA) 90 mcg/act inhaler, INHALE 2 PUFFS BY MOUTH EVERY 6 HOURS AS NEEDED FOR WHEEZING, Disp: 18 g, Rfl: 2  •  budesonide-formoterol (Symbicort) 160-4 5 mcg/act inhaler, Inhale 2 puffs 2 (two) times a day Rinse mouth after use , Disp: 10 2 g, Rfl: 2  •  Cholecalciferol 1000 units CHEW, Chew 5,000 Units daily, Disp: , Rfl:   •  losartan (COZAAR) 50 mg tablet, Take 1 tablet (50 mg total) by mouth daily, Disp: 30 tablet, Rfl: 10  Family History   Problem Relation Age of Onset   • Diabetes Father    • Other Father         colon problems/colostomy   • Alcohol abuse Sister               Coordination of Care: Wound team aware of the treatment plan  Facility nurse will provide wound treatment and monitor the wound for any changes  Patient / Staff education : Patient / Staff was given education on sign of infection and pressure ulcer prevention   Patient/ Staff verbalized understanding     Follow up :  Next week    Voice-recognition software may have been used in the preparation of this document  Occasional wrong word or "sound-alike" substitutions may have occurred due to the inherent limitations of voice recognition software  Interpretation should be guided by context        Kati Card

## 2023-03-14 NOTE — ASSESSMENT & PLAN NOTE
[place]  -Patient health is declining, the wound continues to worsened, covered with necrotic tissue  -This is probably Joyceann Slater ulcer  -Change local wound care with Dakin's quarter strength packing  -Currently on hospice care, goal of treatment is palliative  -Continue to offload  -Follow-up as per hospice referral

## 2023-03-14 NOTE — ASSESSMENT & PLAN NOTE
Palliative care plan was chosen  Based on this plan, there is no expectation of healing  The end goals of our palliative care plan are pain control, drainage management, prevention of infection, odor control, and optimization of quality of life insofar as the wound will allow  Therefore, invasive procedures related to the wound will be minimized  Dressings will be chosen to achieve the palliative care plan goals rather that to achieve healing of the wound  Debridements may be performed periodically in order to prevent infection or control odor, etc   Visits will be scheduled accordingly to minimize disruption of quality of life while allowing appropriate provider oversight of the wound and any dramatic changes that might occur

## 2024-02-21 PROBLEM — Z00.00 MEDICARE ANNUAL WELLNESS VISIT, SUBSEQUENT: Status: RESOLVED | Noted: 2020-07-07 | Resolved: 2024-02-21

## 2024-07-26 ENCOUNTER — TELEPHONE (OUTPATIENT)
Dept: FAMILY MEDICINE CLINIC | Facility: CLINIC | Age: 84
End: 2024-07-26

## 2024-07-26 NOTE — TELEPHONE ENCOUNTER
Fax received from [facility] asking for information regarding patient. Form completed and faxed back. Scanned into media.

## (undated) DEVICE — UNDERGLOVE PROTEXIS  BLUE SZ 7

## (undated) DEVICE — STERILE POLYISOPRENE POWDER-FREE SURGICAL GLOVES WITH EMOLLIENT COATING: Brand: PROTEXIS

## (undated) DEVICE — PENCIL ELECTROSURG E-Z CLEAN -0035H

## (undated) DEVICE — STERILE POLYISOPRENE POWDER-FREE SURGICAL GLOVES: Brand: PROTEXIS

## (undated) DEVICE — INTENDED FOR TISSUE SEPARATION, AND OTHER PROCEDURES THAT REQUIRE A SHARP SURGICAL BLADE TO PUNCTURE OR CUT.: Brand: BARD-PARKER SAFETY BLADES SIZE 10, STERILE

## (undated) DEVICE — SURGICAL CLIPPER BLADE GENERAL USE

## (undated) DEVICE — NEEDLE 25G X 1 1/2

## (undated) DEVICE — SYRINGE 10ML LL CONTROL TOP

## (undated) DEVICE — GARMENT,MEDLINE,DVT,INT,CALF,FOAM,MED: Brand: MEDLINE

## (undated) DEVICE — THYROID SHEET: Brand: CONVERTORS

## (undated) DEVICE — GAUZE SPONGES,16 PLY: Brand: CURITY

## (undated) DEVICE — ABDOMINAL PAD: Brand: DERMACEA

## (undated) DEVICE — CHLORAPREP HI-LITE 26ML ORANGE

## (undated) DEVICE — SKIN MARKER DUAL TIP WITH RULER CAP, FLEXIBLE RULER AND LABELS: Brand: DEVON

## (undated) DEVICE — NEEDLE BLUNT 18 G X 1 1/2IN

## (undated) DEVICE — X-RAY DETECTABLE SPONGES,16 PLY: Brand: VISTEC

## (undated) DEVICE — NDL CNTR 20CT FM MAG: Brand: MEDLINE INDUSTRIES, INC.

## (undated) DEVICE — PISTOL GRIP SKIN STAPLER: Brand: MULTIFIRE PREMIUM

## (undated) DEVICE — PAD GROUNDING ADULT

## (undated) DEVICE — SUT ETHILON 3-0 FSLX 30 IN 1673H

## (undated) DEVICE — 4-PORT MANIFOLD: Brand: NEPTUNE 2

## (undated) DEVICE — POOLE SUCTION HANDLE: Brand: CARDINAL HEALTH

## (undated) DEVICE — SPONGE LAP STERILE 4X18

## (undated) DEVICE — BULB SYRINGE,IRRIGATION WITH PROTECTIVE CAP: Brand: DOVER

## (undated) DEVICE — CURITY PLAIN PACKING STRIP: Brand: CURITY

## (undated) DEVICE — SYRINGE 30ML LL

## (undated) DEVICE — TUBING SUCTION 5MM X 12 FT

## (undated) DEVICE — BASIC PACK: Brand: CONVERTORS